# Patient Record
Sex: FEMALE | Race: WHITE | Employment: PART TIME | URBAN - METROPOLITAN AREA
[De-identification: names, ages, dates, MRNs, and addresses within clinical notes are randomized per-mention and may not be internally consistent; named-entity substitution may affect disease eponyms.]

---

## 2017-01-30 ENCOUNTER — HOSPITAL ENCOUNTER (OUTPATIENT)
Dept: MAMMOGRAPHY | Age: 42
Discharge: HOME | End: 2017-01-30
Attending: SPECIALIST
Payer: COMMERCIAL

## 2017-01-30 ENCOUNTER — TELEPHONE (OUTPATIENT)
Dept: SURGERY | Age: 42
End: 2017-01-30

## 2017-01-30 ENCOUNTER — DOCUMENTATION ONLY (OUTPATIENT)
Dept: SURGERY | Age: 42
End: 2017-01-30

## 2017-01-30 DIAGNOSIS — R92.8 FOLLOW-UP EXAMINATION OF ABNORMAL MAMMOGRAM: ICD-10-CM

## 2017-01-30 PROCEDURE — 76642 ULTRASOUND BREAST LIMITED: CPT

## 2017-01-30 NOTE — PROGRESS NOTES
I called and left a message on the patient's answering machine to make an appointment with Dr. Jody Solitario prior to setting up surgery.

## 2017-01-30 NOTE — TELEPHONE ENCOUNTER
Patient stopped in this morning after her mammogram stating that she saw Dr. Jody Solitario about one year ago for a sebaceous cyst.  It is not infected, however it has recurred, and she would like to have this removed. She wanted to know if she needed to come in to be seen or if she could just schedule surgery for this. I advised the PSR to let the patient know that Dr. Jody Solitario or her nurse will contact her to let her know if she needs to come in prior to scheduling surgery.

## 2018-02-26 ENCOUNTER — HOSPITAL ENCOUNTER (OUTPATIENT)
Dept: MAMMOGRAPHY | Age: 43
Discharge: HOME | End: 2018-02-26
Attending: SPECIALIST
Payer: COMMERCIAL

## 2018-02-26 DIAGNOSIS — Z12.39 BREAST SCREENING: ICD-10-CM

## 2018-02-26 PROCEDURE — 77067 SCR MAMMO BI INCL CAD: CPT

## 2018-07-24 ENCOUNTER — OFFICE VISIT (OUTPATIENT)
Dept: SURGERY | Age: 43
End: 2018-07-24

## 2018-07-24 VITALS
SYSTOLIC BLOOD PRESSURE: 112 MMHG | BODY MASS INDEX: 25.55 KG/M2 | WEIGHT: 159 LBS | HEART RATE: 69 BPM | DIASTOLIC BLOOD PRESSURE: 69 MMHG | HEIGHT: 66 IN

## 2018-07-24 DIAGNOSIS — N60.82 SEBACEOUS CYST OF BREAST, LEFT: Primary | ICD-10-CM

## 2018-07-24 RX ORDER — VILAZODONE HYDROCHLORIDE 40 MG/1
TABLET ORAL
Refills: 11 | COMMUNITY
Start: 2018-06-19

## 2018-07-24 RX ORDER — NORETHINDRONE ACETATE AND ETHINYL ESTRADIOL .03; 1.5 MG/1; MG/1
TABLET ORAL
COMMUNITY
End: 2020-01-31 | Stop reason: ALTCHOICE

## 2018-07-24 NOTE — MR AVS SNAPSHOT
[address] [facility] [address] [facility] 
[phone] Patient: Lugenia Kanner MRN: [id number] Visit Information Date & Time Provider Department Dept. Phone Encounter #  
 7/24/2018 10:30 AM Barbie Joya MD [facility] [phone] [id number] Upcoming Health Maintenance Date Due DTaP/Tdap/Td series (1 - Tdap) 7/23/1994 PAP AKA CERVICAL CYTOLOGY 7/23/1994 Influenza Age 5 to Adult 8/1/2018 Allergies as of 7/24/2018  Review Complete On: 7/24/2018 By: Barbie Joya MD  
 No Known Allergies Current Immunizations  Never Reviewed No immunizations on file. Not reviewed this visit You Were Diagnosed With   
  
 Codes Comments Sebaceous cyst of breast, left    -  Primary ICD-10-CM: G03.88 ICD-9-CM: 610.8 Vitals BP Pulse Height(growth percentile) Weight(growth percentile) BMI OB Status 112/69 69 5' 6\" (1.676 m) 159 lb (72.1 kg) 25.66 kg/m2 Having regular periods Smoking Status Never Smoker BMI and BSA Data Body Mass Index Body Surface Area  
 25.66 kg/m 2 1.83 m 2 Your Updated Medication List  
  
   
This list is accurate as of 7/24/18 12:07 PM.  Always use your most recent med list.  
  
  
  
  
 ALPRAZolam 0.5 mg tablet Commonly known as:  Eliezer Vegas 1.5/30 (21) 1.5-30 mg-mcg Tab Generic drug:  norethindrone ac-eth estradiol Take  by mouth. VIIBRYD 40 mg Tab tablet Generic drug:  vilazodone TAKE 1 TABLET BY MOUTH EVERY DAY Introducing [facility]! [facility] introduces BuildingLayer patient portal. Now you can access parts of your medical record, email your doctor's office, and request medication refills online. 1. In your internet browser, go to [email or web address] 2. Click on the First Time User? Click Here link in the Sign In box.  You will see the New Member Sign Up page. 3. Enter your Linkurious Access Code exactly as it appears below. You will not need to use this code after youve completed the sign-up process. If you do not sign up before the expiration date, you must request a new code. · Linkurious Access Code: [email or web address] Expires: 10/22/2018 10:16 AM 
 
4. Enter the last four digits of your Social Security Number (xxxx) and Date of Birth (mm/dd/yyyy) as indicated and click Submit. You will be taken to the next sign-up page. 5. Create a For[employer] Life Sciencet ID. This will be your Linkurious login ID and cannot be changed, so think of one that is secure and easy to remember. 6. Create a Linkurious password. You can change your password at any time. 7. Enter your Password Reset Question and Answer. This can be used at a later time if you forget your password. 8. Enter your e-mail address. You will receive e-mail notification when new information is available in [address] 9. Click Sign Up. You can now view and download portions of your medical record. 10. Click the Download Summary menu link to download a portable copy of your medical information. If you have questions, please visit the Frequently Asked Questions section of the Linkurious website. Remember, Linkurious is NOT to be used for urgent needs. For medical emergencies, dial 911. Now available from your iPhone and Android! Please provide this summary of care documentation to your next provider. Your primary care clinician is listed as Karlo Jiménez. If you have any questions after today's visit, please call [phone].

## 2018-07-24 NOTE — PROGRESS NOTES
HISTORY OF PRESENT ILLNESS  Cameron Blackburn is a 37 y.o. female. HPI  ESTABLISHED patient here for LEFT breast cyst.  1/2016 I and D of this cyst.  She was considering surgical excision but did not have it done. The cyst is back and she would like it removed before it gets infected again. No pain. Mammogram, 2/26/18, BIRADS 1    ROS    Physical Exam   Pulmonary/Chest: Right breast exhibits no mass, no nipple discharge, no skin change and no tenderness. Left breast exhibits no mass, no nipple discharge, no skin change and no tenderness. Breasts are symmetrical.       Lymphadenopathy:     She has no cervical adenopathy. She has no axillary adenopathy. Right: No supraclavicular adenopathy present. Left: No supraclavicular adenopathy present. Excisional biopsy:  LEFT breast sebaceous cyst  1% lidocaine with epi, 6 cc  1.5 cm elliptical incision. The old I and D scar and cyst were excised. The wound was closed with 4-0 nylon x 3 stitches and dressed with steristrips, gauze and tape. ASSESSMENT and PLAN    ICD-10-CM ICD-9-CM    1. Sebaceous cyst of breast, left N60.82 610.8      LEFT breast sebaceous cyst excised. Remove sutures in 10 days.

## 2018-07-24 NOTE — COMMUNICATION BODY
HISTORY OF PRESENT ILLNESS  Bigg Hubbard is a 37 y.o. female. HPI  ESTABLISHED patient here for LEFT breast cyst.  1/2016 I and D of this cyst.  She was considering surgical excision but did not have it done. The cyst is back and she would like it removed before it gets infected again. No pain. Mammogram, 2/26/18, BIRADS 1    ROS    Physical Exam   Pulmonary/Chest: Right breast exhibits no mass, no nipple discharge, no skin change and no tenderness. Left breast exhibits no mass, no nipple discharge, no skin change and no tenderness. Breasts are symmetrical.       Lymphadenopathy:     She has no cervical adenopathy. She has no axillary adenopathy. Right: No supraclavicular adenopathy present. Left: No supraclavicular adenopathy present. Excisional biopsy:  LEFT breast sebaceous cyst  1% lidocaine with epi, 6 cc  1.5 cm elliptical incision. The old I and D scar and cyst were excised. The wound was closed with 4-0 nylon x 3 stitches and dressed with steristrips, gauze and tape. ASSESSMENT and PLAN    ICD-10-CM ICD-9-CM    1. Sebaceous cyst of breast, left N60.82 610.8      LEFT breast sebaceous cyst excised. Remove sutures in 10 days.

## 2019-04-08 ENCOUNTER — HOSPITAL ENCOUNTER (OUTPATIENT)
Dept: MAMMOGRAPHY | Age: 44
Discharge: HOME | End: 2019-04-08
Attending: SPECIALIST
Payer: COMMERCIAL

## 2019-04-08 DIAGNOSIS — Z12.31 VISIT FOR SCREENING MAMMOGRAM: ICD-10-CM

## 2019-04-08 PROCEDURE — 77067 SCR MAMMO BI INCL CAD: CPT

## 2020-01-28 ENCOUNTER — HOSPITAL ENCOUNTER (OUTPATIENT)
Dept: MAMMOGRAPHY | Age: 45
Discharge: HOME | End: 2020-01-28
Attending: SPECIALIST
Payer: COMMERCIAL

## 2020-01-28 DIAGNOSIS — N63.21 BREAST LUMP ON LEFT SIDE AT 1 O'CLOCK POSITION: ICD-10-CM

## 2020-01-28 PROCEDURE — 76642 ULTRASOUND BREAST LIMITED: CPT

## 2020-01-28 PROCEDURE — 77065 DX MAMMO INCL CAD UNI: CPT

## 2020-01-31 ENCOUNTER — HOSPITAL ENCOUNTER (OUTPATIENT)
Dept: LAB | Age: 45
Discharge: HOME | End: 2020-01-31

## 2020-01-31 ENCOUNTER — OFFICE VISIT (OUTPATIENT)
Dept: SURGERY | Age: 45
End: 2020-01-31

## 2020-01-31 VITALS
BODY MASS INDEX: 26.52 KG/M2 | SYSTOLIC BLOOD PRESSURE: 107 MMHG | DIASTOLIC BLOOD PRESSURE: 62 MMHG | HEIGHT: 66 IN | HEART RATE: 64 BPM | WEIGHT: 165 LBS

## 2020-01-31 DIAGNOSIS — N63.20 LEFT BREAST MASS: ICD-10-CM

## 2020-01-31 DIAGNOSIS — R59.0 AXILLARY ADENOPATHY: ICD-10-CM

## 2020-01-31 DIAGNOSIS — R92.8 ABNORMAL MAMMOGRAM OF LEFT BREAST: Primary | ICD-10-CM

## 2020-01-31 RX ORDER — ETONOGESTREL AND ETHINYL ESTRADIOL 11.7; 2.7 MG/1; MG/1
INSERT, EXTENDED RELEASE VAGINAL
COMMUNITY
End: 2020-03-20

## 2020-01-31 NOTE — LETTER
1/31/20 Patient: Matt Meza YOB: 1975 Date of Visit: 1/31/2020 Daljit Ulloa MD 
[address] [facility] [address] VIA Facsimile: [phone] Dear Daljit Ulloa MD, Thank you for referring Ms. Armando Shrestha to [address] [place] [address] for evaluation. My notes for this consultation are attached. If you have questions, please do not hesitate to call me. I look forward to following your patient along with you. Sincerely, Yvan Hameed MD

## 2020-01-31 NOTE — PATIENT INSTRUCTIONS
Ultrasound-Guided Breast Biopsy: About This Test  What is it? A breast biopsy removes a sample of breast tissue that is looked at under a microscope to check for breast cancer. Ultrasound is used to show an image of the breast tissue during the biopsy. This is called ultrasound-guided breast biopsy. Why is this test done? A breast biopsy is usually done to check a lump or a suspicious area for cancer. If there is a good chance that your doctor can get a sample without doing an open (surgical) biopsy, you can have a needle biopsy instead. For a needle breast biopsy, your doctor uses a needle to take a small sample of fluid or cells from the breast for testing. When the biopsy area is not easy to find, the breast biopsy needle is usually guided with ultrasound. An ultrasound uses sound waves to make a picture of the inside of the breast. The sound waves create a picture on a video monitor. How can you prepare for the test?  Talk to your doctor about all your health conditions before the test. For example, tell your doctor if you:  · Are taking any medicines. · Are allergic to any medicines. · Have had bleeding problems, or if you take aspirin or some other blood thinner. · Are or might be pregnant. What happens before the test?  · You will take off your clothing above the waist. A paper or cloth gown will cover your shoulders. · The biopsy will be done while you sit or lie on an examination table. Your hands may be at your sides or raised above your head (whichever position makes it easiest to find the lump or suspicious area). · Your skin is washed with a special soap. · You may be given a shot of medicine to numb the biopsy area on your breast.  What happens during the test?  Ultrasound is used to guide the placement of the needle during the biopsy. · A warm gel will be spread on your breast.  · The ultrasound wand is pressed against your skin and gently moved around.  A picture of the breast can be seen on a video monitor. · A needle or tiny probe is put through your skin into your breast tissue. · If the lump is a cyst, the needle will take out fluid. If the lump is solid, the needle will take a sample of tissue. · The needle is removed and pressure put on the needle site to stop any bleeding. The area is covered with a bandage. What else should you know about the test?  · Ultrasound is painless and does not use radiation. · You will feel only a quick sting from the needle if you have a local anesthetic to numb the biopsy area. You may feel some pressure when the biopsy needle is put in. How long does the test take? · The test usually takes about 15 minutes. This depends on how many biopsy samples are needed. What happens after the test?  · You'll be told how long it may take to get your results back. · You will probably be able to go home right away. · You can go back to your usual activities right away, but avoid heavy lifting for 24 hours. · The site may be tender for 2 or 3 days. You may also have some bruising, swelling, or slight bleeding. ? You can use an ice pack. Put ice or a cold pack on the area for 10 to 20 minutes at a time. Put a thin cloth between the ice and your skin. ? Ask your doctor if you can take an over-the-counter pain medicine, such as acetaminophen (Tylenol), ibuprofen (Advil, Motrin), or naproxen (Aleve). Be safe with medicines. Read and follow all instructions on the label. · After a specialist looks at the biopsy sample for signs of cancer, your doctor's office will let you know the results. · If the test results are not clear, you may have another biopsy or test.  Follow-up care is a key part of your treatment and safety. Be sure to make and go to all appointments, and call your doctor if you are having problems. It's also a good idea to keep a list of the medicines you take. Ask your doctor when you can expect to have your test results.   Where can you learn more?  Go to http://jian-darline.info/. Enter B579 in the search box to learn more about \"Ultrasound-Guided Breast Biopsy: About This Test.\"  Current as of: December 19, 2018  Content Version: 12.2  © 3780-6015 [employer]. Care instructions adapted under license by [employer] (which disclaims liability or warranty for this information). If you have questions about a medical condition or this instruction, always ask your healthcare professional. [place] [address] any warranty or liability for your use of this information.

## 2020-01-31 NOTE — PROGRESS NOTES
HISTORY OF PRESENT ILLNESS  Vlad England is a 53 y.o. female. HPI NEW Patient presents for consultation at the request of Dr. Jean Wynn for abnormal mammogram and breast ultrasound LEFT breast. She was able to feel a lump in her LEFT breast which led to diagnostic imaging. She had a normal screening mammogram in April 2019. She has been Dr. Kevin Monson in the past for sebaceous cyst in LEFT breast.   Her mother is here with her today. Family history-  No FH of breast cancer. Breast imaging-  [facility] Results (most recent):  Results from Hospital Encounter encounter on 01/28/20   [facility] MAMMO LT DX INCL CAD    Narrative INDICATION: Left breast upper outer quadrant palpable abnormality. Digital diagnostic left breast diagnostic mammogram is performed and interpreted  in conjunction with CAD. Direct comparison is made to prior mammograms. The breast parenchyma is heterogeneously dense. Within the upper-outer quadrant  of the left breast, there is a partially obscured, spiculated lesion, new  compared to prior mammograms and correlating with palpable abnormality. There  are also new associated linear microcalcifications. Within the right axilla,  there are two enlarged lymph nodes. Targeted sonographic evaluation of the upper-outer quadrant of the left breast  is performed. Within the 2:00 position of the left breast, there is an irregular  1.9 cm x 1.8 cm x 1 cm shadowing hypoechoic mass, correlating with palpable and  mammographic abnormalities. Targeted sonographic evaluation of the left axilla is performed. Within the left  axilla, there are two new, enlarged suspicious appearing lymph nodes; the larger  of the two measures 2.7 cm x 2 cm. Impression IMPRESSION: BI-RADS 5. Highly suggestive of malignancy. Recommend histologic  correlation. 2:00 left breast lesion and left axillary lymphadenopathy.  Findings  were discussed with the patient and communicated to Dr. Nelson Slight office at the  time of interpretation. Past Medical History:   Diagnosis Date    Anxiety and depression      Past Surgical History:   Procedure Laterality Date    HX BREAST BIOPSY Left 07/24/2018    Left sebaceous cyst surgically removed    HX CYST INCISION AND DRAINAGE Left 20+ yrs ago    HX HEENT      tonsillectomy     Social History     Socioeconomic History    Marital status:      Spouse name: Not on file    Number of children: Not on file    Years of education: Not on file    Highest education level: Not on file   Occupational History    Not on file   Social Needs    Financial resource strain: Not on file    Food insecurity:     Worry: Not on file     Inability: Not on file    Transportation needs:     Medical: Not on file     Non-medical: Not on file   Tobacco Use    Smoking status: Never Smoker    Smokeless tobacco: Never Used   Substance and Sexual Activity    Alcohol use: No    Drug use: No    Sexual activity: Not on file   Lifestyle    Physical activity:     Days per week: Not on file     Minutes per session: Not on file    Stress: Not on file   Relationships    Social connections:     Talks on phone: Not on file     Gets together: Not on file     Attends Church service: Not on file     Active member of club or organization: Not on file     Attends meetings of clubs or organizations: Not on file     Relationship status: Not on file    Intimate partner violence:     Fear of current or ex partner: Not on file     Emotionally abused: Not on file     Physically abused: Not on file     Forced sexual activity: Not on file   Other Topics Concern    Not on file   Social History Narrative    Not on file     OB History    No obstetric history on file. Obstetric Comments   Menarche:  13. LMP: 12/2019. # of Children:  4. Age at Delivery of First Child:  30.   Hysterectomy/oophorectomy:  YES/NO. Breast Bx:  ? Beatrice Gregory Hx of Breast Feeding:  yes. BCP:  yes.  Hormone therapy:  no. Current Outpatient Medications:     ethinyl estradiol-etonogestrel (NUVARING) 0.12-0.015 mg/24 hr vaginal ring, by Intravaginal route., Disp: , Rfl:     VIIBRYD 40 mg tab tablet, TAKE 1 TABLET BY MOUTH EVERY DAY, Disp: , Rfl: 11    ALPRAZolam (XANAX) 0.5 mg tablet, , Disp: , Rfl: 1  No Known Allergies  Review of Systems   Constitutional: Negative. HENT: Negative. Eyes: Negative. Respiratory: Negative. Cardiovascular: Negative. Gastrointestinal: Negative. Genitourinary: Negative. Musculoskeletal: Negative. Skin: Negative. Neurological: Negative. Endo/Heme/Allergies: Negative. Psychiatric/Behavioral: The patient is nervous/anxious. All other systems reviewed and are negative. Physical Exam  Vitals signs and nursing note reviewed. Constitutional:       Appearance: She is well-developed. HENT:      Head: Normocephalic. Neck:      Musculoskeletal: Neck supple. Thyroid: No thyromegaly. Pulmonary:      Effort: Pulmonary effort is normal.   Chest:      Breasts: Breasts are symmetrical.         Right: No inverted nipple, mass, nipple discharge, skin change or tenderness. Left: Mass present. No inverted nipple, nipple discharge, skin change or tenderness. Comments: 2 cm upper outer quadrant mass on left breast   Abdominal:      Palpations: Abdomen is soft. Musculoskeletal: Normal range of motion. Lymphadenopathy:      Cervical: No cervical adenopathy. Upper Body:      Left upper body: Axillary adenopathy present. Skin:     General: Skin is warm and dry. Neurological:      Mental Status: She is alert and oriented to person, place, and time. US - Guided Core Biopsy  Indication : Palpable mass left breast 2:00   Ultrasound Findings: 2:00 position of the left breast, there is an irregular  1.9 cm x 1.8 cm x 1 cm shadowing hypoechoic mass  Prep : alcohol. Anesthesia : 1% lidocaine with epinephrine, 10 cc   Device :  The Sportingoe device was advanced through the lesion and captured tissue with real-time Ultrasound Confirmation. Core Sampling :  3 cores were obtained. Marker: hydromark   Dressing : Steristrips, gauze and tape. Instructions : Remove gauze this evening. Remove steristrips in one week. US - Guided Core Biopsy  Indication : Palpable mass left axilla  Ultrasound Findings: 2 abnormal enlarged nodes with loss of fatty hilum left axilla   Prep : alcohol. Anesthesia : 1% lidocaine with epinephrine, 10 cc. Device : The bard marquee device was advanced through the lesion and captured tissue with real-time Ultrasound Confirmation. Core Sampling :  3 cores were obtained. Marker: hydromark    Dressing : Steristrips, gauze and tape. Instructions : Remove gauze this evening. Remove steristrips in one week. [address] [place] [address]  OFFICE PROCEDURE PROGRESS NOTE        Chart reviewed for the following:   Aura Frank MD, have reviewed the History, Physical and updated the Allergic reactions for [address] performed immediately prior to start of procedure:   Aura Frank MD, have performed the following reviews on [address] [place] [address] prior to the start of the procedure:            * Patient was identified by name and date of birth   * Agreement on procedure being performed was verified  * Risks and Benefits explained to the patient  * Procedure site verified and marked as necessary  * Patient was positioned for comfort  * Consent was signed and verified     Time: 12:00 pm       Date of procedure: 1/31/2020    Procedure performed by:  Christianne Balderrama MD    Provider assisted by: Heather Rader RN    Patient assisted by: self    How tolerated by patient: tolerated the procedure well with no complications    Post Procedural Pain Scale: 2 - Hurts Little Bit    Comments: none          ASSESSMENT and PLAN    ICD-10-CM ICD-9-CM    1.  Abnormal mammogram of left breast R92.8 793.80 SURGICAL PATHOLOGY   2. Left breast mass N63.20 611.72    3. Axillary adenopathy R59.0 785. 10      52 yo female with Birad 5 left breast mass and axillary node. She is s/p core biopsy of both. I had a discussion with her and her mom I thought this was a breast cancer and that it had spread to at least 2 lymph nodes. I will call her with results and plan will be breast mri and ct scans for staging. She will also need genetic testing. They agreed with the plan. Her referring physician Dr. Dayana Preston was notified. She tolerated the biopsies well.

## 2020-01-31 NOTE — COMMUNICATION BODY
HISTORY OF PRESENT ILLNESS  Dilma Ziegler is a 53 y.o. female. HPI NEW Patient presents for consultation at the request of Dr. Martha Finley for abnormal mammogram and breast ultrasound LEFT breast. She was able to feel a lump in her LEFT breast which led to diagnostic imaging. She had a normal screening mammogram in April 2019. She has been Dr. Jad Rivera in the past for sebaceous cyst in LEFT breast.   Her mother is here with her today. Family history-  No FH of breast cancer. Breast imaging-  [facility] Results (most recent):  Results from Hospital Encounter encounter on 01/28/20   [facility] MAMMO LT DX INCL CAD    Narrative INDICATION: Left breast upper outer quadrant palpable abnormality. Digital diagnostic left breast diagnostic mammogram is performed and interpreted  in conjunction with CAD. Direct comparison is made to prior mammograms. The breast parenchyma is heterogeneously dense. Within the upper-outer quadrant  of the left breast, there is a partially obscured, spiculated lesion, new  compared to prior mammograms and correlating with palpable abnormality. There  are also new associated linear microcalcifications. Within the right axilla,  there are two enlarged lymph nodes. Targeted sonographic evaluation of the upper-outer quadrant of the left breast  is performed. Within the 2:00 position of the left breast, there is an irregular  1.9 cm x 1.8 cm x 1 cm shadowing hypoechoic mass, correlating with palpable and  mammographic abnormalities. Targeted sonographic evaluation of the left axilla is performed. Within the left  axilla, there are two new, enlarged suspicious appearing lymph nodes; the larger  of the two measures 2.7 cm x 2 cm. Impression IMPRESSION: BI-RADS 5. Highly suggestive of malignancy. Recommend histologic  correlation. 2:00 left breast lesion and left axillary lymphadenopathy.  Findings  were discussed with the patient and communicated to Dr. Johny piper at the  time of interpretation. Past Medical History:   Diagnosis Date    Anxiety and depression      Past Surgical History:   Procedure Laterality Date    HX BREAST BIOPSY Left 07/24/2018    Left sebaceous cyst surgically removed    HX CYST INCISION AND DRAINAGE Left 20+ yrs ago    HX HEENT      tonsillectomy     Social History     Socioeconomic History    Marital status:      Spouse name: Not on file    Number of children: Not on file    Years of education: Not on file    Highest education level: Not on file   Occupational History    Not on file   Social Needs    Financial resource strain: Not on file    Food insecurity:     Worry: Not on file     Inability: Not on file    Transportation needs:     Medical: Not on file     Non-medical: Not on file   Tobacco Use    Smoking status: Never Smoker    Smokeless tobacco: Never Used   Substance and Sexual Activity    Alcohol use: No    Drug use: No    Sexual activity: Not on file   Lifestyle    Physical activity:     Days per week: Not on file     Minutes per session: Not on file    Stress: Not on file   Relationships    Social connections:     Talks on phone: Not on file     Gets together: Not on file     Attends Holiness service: Not on file     Active member of club or organization: Not on file     Attends meetings of clubs or organizations: Not on file     Relationship status: Not on file    Intimate partner violence:     Fear of current or ex partner: Not on file     Emotionally abused: Not on file     Physically abused: Not on file     Forced sexual activity: Not on file   Other Topics Concern    Not on file   Social History Narrative    Not on file     OB History    No obstetric history on file. Obstetric Comments   Menarche:  13. LMP: 12/2019. # of Children:  4. Age at Delivery of First Child:  30.   Hysterectomy/oophorectomy:  YES/NO. Breast Bx:  ? Mliss Elise Hx of Breast Feeding:  yes. BCP:  yes.  Hormone therapy:  no. Current Outpatient Medications:     ethinyl estradiol-etonogestrel (NUVARING) 0.12-0.015 mg/24 hr vaginal ring, by Intravaginal route., Disp: , Rfl:     VIIBRYD 40 mg tab tablet, TAKE 1 TABLET BY MOUTH EVERY DAY, Disp: , Rfl: 11    ALPRAZolam (XANAX) 0.5 mg tablet, , Disp: , Rfl: 1  No Known Allergies  Review of Systems   Constitutional: Negative. HENT: Negative. Eyes: Negative. Respiratory: Negative. Cardiovascular: Negative. Gastrointestinal: Negative. Genitourinary: Negative. Musculoskeletal: Negative. Skin: Negative. Neurological: Negative. Endo/Heme/Allergies: Negative. Psychiatric/Behavioral: The patient is nervous/anxious. All other systems reviewed and are negative. Physical Exam  Vitals signs and nursing note reviewed. Constitutional:       Appearance: She is well-developed. HENT:      Head: Normocephalic. Neck:      Musculoskeletal: Neck supple. Thyroid: No thyromegaly. Pulmonary:      Effort: Pulmonary effort is normal.   Chest:      Breasts: Breasts are symmetrical.         Right: No inverted nipple, mass, nipple discharge, skin change or tenderness. Left: Mass present. No inverted nipple, nipple discharge, skin change or tenderness. Comments: 2 cm upper outer quadrant mass on left breast   Abdominal:      Palpations: Abdomen is soft. Musculoskeletal: Normal range of motion. Lymphadenopathy:      Cervical: No cervical adenopathy. Upper Body:      Left upper body: Axillary adenopathy present. Skin:     General: Skin is warm and dry. Neurological:      Mental Status: She is alert and oriented to person, place, and time. US - Guided Core Biopsy  Indication : Palpable mass left breast 2:00   Ultrasound Findings: 2:00 position of the left breast, there is an irregular  1.9 cm x 1.8 cm x 1 cm shadowing hypoechoic mass  Prep : alcohol. Anesthesia : 1% lidocaine with epinephrine, 10 cc   Device :  The MoboTape device was advanced through the lesion and captured tissue with real-time Ultrasound Confirmation. Core Sampling :  3 cores were obtained. Marker: hydromark   Dressing : Steristrips, gauze and tape. Instructions : Remove gauze this evening. Remove steristrips in one week. US - Guided Core Biopsy  Indication : Palpable mass left axilla  Ultrasound Findings: 2 abnormal enlarged nodes with loss of fatty hilum left axilla   Prep : alcohol. Anesthesia : 1% lidocaine with epinephrine, 10 cc. Device : The ProtoExchange marquee device was advanced through the lesion and captured tissue with real-time Ultrasound Confirmation. Core Sampling :  3 cores were obtained. Marker: hydromark    Dressing : Steristrips, gauze and tape. Instructions : Remove gauze this evening. Remove steristrips in one week. [address] [place] [address]  OFFICE PROCEDURE PROGRESS NOTE        Chart reviewed for the following:   Erica Terry MD, have reviewed the History, Physical and updated the Allergic reactions for Erica [address] performed immediately prior to start of procedure:   Erica Terry MD, have performed the following reviews on [address] [place] [address] prior to the start of the procedure:            * Patient was identified by name and date of birth   * Agreement on procedure being performed was verified  * Risks and Benefits explained to the patient  * Procedure site verified and marked as necessary  * Patient was positioned for comfort  * Consent was signed and verified     Time: 12:00 pm       Date of procedure: 1/31/2020    Procedure performed by:  Joelle Mercado MD    Provider assisted by: Alexandra Clements RN    Patient assisted by: self    How tolerated by patient: tolerated the procedure well with no complications    Post Procedural Pain Scale: 2 - Hurts Little Bit    Comments: none          ASSESSMENT and PLAN    ICD-10-CM ICD-9-CM    1.  Abnormal mammogram of left breast R92.8 793.80 SURGICAL PATHOLOGY   2. Left breast mass N63.20 611.72    3. Axillary adenopathy R59.0 785. 10      52 yo female with Birad 5 left breast mass and axillary node. She is s/p core biopsy of both. I had a discussion with her and her mom I thought this was a breast cancer and that it had spread to at least 2 lymph nodes. I will call her with results and plan will be breast mri and ct scans for staging. She will also need genetic testing. They agreed with the plan. Her referring physician Dr. Luz Gallo was notified. She tolerated the biopsies well.

## 2020-02-05 ENCOUNTER — DOCUMENTATION ONLY (OUTPATIENT)
Dept: SURGERY | Age: 45
End: 2020-02-05

## 2020-02-05 DIAGNOSIS — C77.3 CARCINOMA OF RIGHT BREAST METASTATIC TO AXILLARY LYMPH NODE (HCC): Primary | ICD-10-CM

## 2020-02-05 DIAGNOSIS — C50.912 MALIGNANT NEOPLASM OF LEFT FEMALE BREAST, UNSPECIFIED ESTROGEN RECEPTOR STATUS, UNSPECIFIED SITE OF BREAST (HCC): ICD-10-CM

## 2020-02-05 DIAGNOSIS — C50.911 CARCINOMA OF RIGHT BREAST METASTATIC TO AXILLARY LYMPH NODE (HCC): Primary | ICD-10-CM

## 2020-02-05 NOTE — PROGRESS NOTES
DR. Thurman Babinski ON 2- AT 2:30 PM/ THEY WILL MAIL PATIENT PACKET, FILL OUT FORMS AND BRING TO APPT.     [phone]  [facility]  MOB 2  [address]    MESSAGE WAS LEFT FOR PATIENT TO CALL ME BACK TO GET ABOVE INFORMATION

## 2020-02-07 ENCOUNTER — HOSPITAL ENCOUNTER (OUTPATIENT)
Dept: NUCLEAR MEDICINE | Age: 45
Discharge: HOME | End: 2020-02-07
Attending: SURGERY
Payer: COMMERCIAL

## 2020-02-07 ENCOUNTER — HOSPITAL ENCOUNTER (OUTPATIENT)
Dept: CT IMAGING | Age: 45
Discharge: HOME | End: 2020-02-07
Attending: SURGERY
Payer: COMMERCIAL

## 2020-02-07 DIAGNOSIS — C77.3 BREAST CANCER METASTASIZED TO AXILLARY LYMPH NODE, LEFT (HCC): ICD-10-CM

## 2020-02-07 DIAGNOSIS — C50.912 BREAST CANCER METASTASIZED TO AXILLARY LYMPH NODE, LEFT (HCC): ICD-10-CM

## 2020-02-07 PROCEDURE — 74011000258 HC RX REV CODE- 258: Performed by: RADIOLOGY

## 2020-02-07 PROCEDURE — A9503 TC99M MEDRONATE: HCPCS

## 2020-02-07 PROCEDURE — 74011636320 HC RX REV CODE- 636/320: Performed by: RADIOLOGY

## 2020-02-07 PROCEDURE — 78306 BONE IMAGING WHOLE BODY: CPT

## 2020-02-07 PROCEDURE — 71260 CT THORAX DX C+: CPT

## 2020-02-07 PROCEDURE — 74177 CT ABD & PELVIS W/CONTRAST: CPT

## 2020-02-07 RX ORDER — SODIUM CHLORIDE 0.9 % (FLUSH) 0.9 %
10 SYRINGE (ML) INJECTION
Status: COMPLETED | OUTPATIENT
Start: 2020-02-07 | End: 2020-02-07

## 2020-02-07 RX ADMIN — IOHEXOL 50 ML: 240 INJECTION, SOLUTION INTRATHECAL; INTRAVASCULAR; INTRAVENOUS; ORAL at 13:47

## 2020-02-07 RX ADMIN — Medication 10 ML: at 13:47

## 2020-02-07 RX ADMIN — SODIUM CHLORIDE 100 ML: 900 INJECTION, SOLUTION INTRAVENOUS at 13:47

## 2020-02-07 RX ADMIN — IOPAMIDOL 100 ML: 755 INJECTION, SOLUTION INTRAVENOUS at 13:47

## 2020-02-11 ENCOUNTER — OFFICE VISIT (OUTPATIENT)
Dept: ONCOLOGY | Age: 45
End: 2020-02-11

## 2020-02-11 VITALS
HEART RATE: 67 BPM | DIASTOLIC BLOOD PRESSURE: 73 MMHG | HEIGHT: 66 IN | WEIGHT: 169.6 LBS | OXYGEN SATURATION: 98 % | TEMPERATURE: 98.8 F | BODY MASS INDEX: 27.26 KG/M2 | RESPIRATION RATE: 16 BRPM | SYSTOLIC BLOOD PRESSURE: 119 MMHG

## 2020-02-11 DIAGNOSIS — Z17.1 MALIGNANT NEOPLASM OF UPPER-OUTER QUADRANT OF LEFT BREAST IN FEMALE, ESTROGEN RECEPTOR NEGATIVE (HCC): Primary | ICD-10-CM

## 2020-02-11 DIAGNOSIS — R11.2 CINV (CHEMOTHERAPY-INDUCED NAUSEA AND VOMITING): ICD-10-CM

## 2020-02-11 DIAGNOSIS — Z51.81 ENCOUNTER FOR MONITORING CARDIOTOXIC DRUG THERAPY: ICD-10-CM

## 2020-02-11 DIAGNOSIS — R19.7 DIARRHEA, UNSPECIFIED TYPE: ICD-10-CM

## 2020-02-11 DIAGNOSIS — C50.412 MALIGNANT NEOPLASM OF UPPER-OUTER QUADRANT OF LEFT BREAST IN FEMALE, ESTROGEN RECEPTOR NEGATIVE (HCC): Primary | ICD-10-CM

## 2020-02-11 DIAGNOSIS — T45.1X5A CINV (CHEMOTHERAPY-INDUCED NAUSEA AND VOMITING): ICD-10-CM

## 2020-02-11 DIAGNOSIS — Z79.899 ENCOUNTER FOR MONITORING CARDIOTOXIC DRUG THERAPY: ICD-10-CM

## 2020-02-11 RX ORDER — LIDOCAINE AND PRILOCAINE 25; 25 MG/G; MG/G
CREAM TOPICAL AS NEEDED
Qty: 30 G | Refills: 0 | Status: ON HOLD | OUTPATIENT
Start: 2020-02-11 | End: 2021-04-06

## 2020-02-11 RX ORDER — DIPHENOXYLATE HYDROCHLORIDE AND ATROPINE SULFATE 2.5; .025 MG/1; MG/1
1 TABLET ORAL
Qty: 30 TAB | Refills: 1 | Status: SHIPPED | OUTPATIENT
Start: 2020-02-11 | End: 2020-08-17

## 2020-02-11 RX ORDER — DEXAMETHASONE 4 MG/1
8 TABLET ORAL 2 TIMES DAILY WITH MEALS
Qty: 75 TAB | Refills: 1 | Status: ON HOLD | OUTPATIENT
Start: 2020-02-11 | End: 2021-04-06

## 2020-02-11 RX ORDER — PROCHLORPERAZINE MALEATE 10 MG
5 TABLET ORAL
Qty: 30 TAB | Refills: 1 | Status: ON HOLD | OUTPATIENT
Start: 2020-02-11 | End: 2021-04-06

## 2020-02-11 RX ORDER — ONDANSETRON 4 MG/1
4 TABLET, ORALLY DISINTEGRATING ORAL
Qty: 30 TAB | Refills: 1 | Status: SHIPPED | OUTPATIENT
Start: 2020-02-11 | End: 2020-04-15 | Stop reason: SDUPTHER

## 2020-02-11 NOTE — PROGRESS NOTES
52 y/o cauc female here for a new pt. Appointment for newly diagnosed left sided breast cancer. Pt had an normal mammo in April 2019. Pt felt a lump which is why she got another mammogram.  Biopsy of lump and LN (+) for cancer. Her 2(+) er/pr (-). Here to go over a plan of care. Pt prefers to do neoadjuvant tx. Pt is here with her mother and . Pt mom thinks her mother had breast cancer.

## 2020-02-11 NOTE — PROGRESS NOTES
[facility]  at [address] [place] [address], [address] [facility]  [phone]       Oncology Consultation        Patient: Pati Estrella MRN: [id number]  SSN: [id number]    YOB: 1975  Age: 53 y.o. Sex: female      Subjective:      Pati Estrella is a 53 y.o. female who I am seeing in consultation for a new diagnosis of left sided invasive breast carcinoma. She felt a lump in her left breast. Dr. Celeste Us obtained a diagnostic mammogram. The mammogram showed an abnormality in the upper quadrant of the left breast. A biopsy of the lesion revealed a ER 0% OR 0% and Her 2 3+ Gr 3 IDC of the left breast. She comes in to discuss the next steps. Review of Systems:    Constitutional: negative  Eyes: negative  Ears, Nose, Mouth, Throat, and Face: negative  Respiratory: negative  Cardiovascular: negative  Gastrointestinal: negative  Genitourinary:negative  Integument/Breast: negative  Hematologic/Lymphatic: negative  Musculoskeletal:negative  Neurological: negative    Past Medical History:   Diagnosis Date    Anxiety and depression     Cancer ([facility] [address] 01/2020    left breast ca er/pr (-) her 2 (+)     Past Surgical History:   Procedure Laterality Date    HX BREAST BIOPSY Left 07/24/2018    Left sebaceous cyst surgically removed    HX CYST INCISION AND DRAINAGE Left 20+ yrs ago   [place] HX HEENT      tonsillectomy      Family History   Problem Relation Age of Onset    Hypertension Mother     Kidney Disease Mother     Thyroid Disease Mother      Social History     Tobacco Use    Smoking status: Never Smoker    Smokeless tobacco: Never Used   Substance Use Topics    Alcohol use: No      Prior to Admission medications    Medication Sig Start Date End Date Taking?  Authorizing Provider   ondansetron (ZOFRAN ODT) 4 mg disintegrating tablet Take 1 Tab by mouth every eight (8) hours as needed for Nausea or Vomiting. 2/11/20  Yes Nany Rosenberg NP   prochlorperazine (COMPAZINE) 10 mg tablet Take 0.5 Tabs by mouth every six (6) hours as needed for Nausea for up to 30 doses. 2/11/20  Yes Mikayla Marquez NP   diphenoxylate-atropine (LOMOTIL) 2.5-0.025 mg per tablet Take 1 Tab by mouth four (4) times daily as needed for Diarrhea. Max Daily Amount: 4 Tabs. 2/11/20  Yes Mikayla Marquez NP   lidocaine-prilocaine (EMLA) topical cream Apply  to affected area as needed for Pain. 2/11/20  Yes Mikayla Marquez NP   dexAMETHasone (DECADRON) 4 mg tablet Take 8 mg by mouth two (2) times daily (with meals). The day of chemotherapy and the day after chemotherapy. 2/11/20  Yes Nany Rosenberg NP   ethinyl estradiol-etonogestrel (NUVARING) 0.12-0.015 mg/24 hr vaginal ring by Intravaginal route. Yes Provider, Historical   VIIBRYD 40 mg tab tablet TAKE 1 TABLET BY MOUTH EVERY DAY 6/19/18  Yes Provider, Historical   ALPRAZolam (XANAX) 0.5 mg tablet 0.5 mg as needed. 1/21/16  Yes Provider, Historical              No Known Allergies        Objective:     Vitals:    02/11/20 0915   BP: 119/73   Pulse: 67   Resp: 16   Temp: 98.8 °F (37.1 °C)   TempSrc: Oral   SpO2: 98%   Weight: 169 lb 9.6 oz (76.9 kg)   Height: 5' 6\" (1.676 m)            Physical Exam:    GENERAL: alert, cooperative, no distress, appears stated age  EYE: conjunctivae/corneas clear. PERRL, EOM's intact  LYMPHATIC: Cervical, supraclavicular, and axillary nodes normal.   THROAT & NECK: normal and no erythema or exudates noted. LUNG: clear to auscultation bilaterally  HEART: regular rate and rhythm, S1, S2 normal, no murmur, click, rub or gallop  ABDOMEN: soft, non-tender. Bowel sounds normal. No masses,  no organomegaly  EXTREMITIES:  extremities normal, atraumatic, no cyanosis or edema  SKIN: Normal.  NEUROLOGIC: AOx3. Gait normal. Reflexes and motor strength normal and symmetric. Cranial nerves 2-12 and sensation grossly intact. Assessment:     1.  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 I explained her the stage of the disease, pathophysiology of the disease and the treatment approaches. I answered all her questions. More than 50% of the time was utilized in education, counseling and co-ordination of care. Patient sent for consideration of neoadjuvant chemotherapy. I spent significant time in explaining the rationale of neoadjuvant chemotherapy which is tumor shrinkage with the hope to achieve a successful surgical treatment (with clear surgical margin) which may be a lumpectomy/mastectomy associated with axillary lymph node dissection. I explained to Ms. Kendall Roque that neoadjuvant chemotherapy has not shown to be superior to adjuvant chemotherapy in the treatment of breast cancer. However if the patient achieves a pathological complete remission (pCR), her chances for 5 year survival is excellent. The most effective combination for neoadjuvant treatment for Her 2 +ve locally advanced breast cancer is dual Her blockade with Pertuzumab/Herceptin in combination with Taxanes. There are two trials which have displayed a high response rate with dual Her blockade. NeoSphere trial is an open-label, phase 2 study, treatment-naive women with HER2-positive breast cancer were randomly assigned (1:1:1:1) centrally and stratified by operable, locally advanced, and inflammatory breast cancer. Patients given pertuzumab and trastuzumab plus docetaxel (group B) had a significantly improved pathological complete response rate compared with those given trastuzumab plus docetaxel, without substantial differences in tolerability (49% vs 29%). [facility] Oncology, Kallie Duncan. Al. Jan 2012].      [facility]asco is another phase II trial where patients Her 2 + locally advanced breast cancer were randomized to receive dual Her blockade with Trastuzumab/Pertuzumab in combination with Taxane and anthracycline. The rates of pCR ranged between 40-50% in the dual Her blockade. (Kevin SCHROEDER Et al. Annals of Oncology 2013)   Taken together dual Her blockade is the most effective way of treating Her 2 positive patients with locally advanced breast cancer. Thus I have elected to administer [address] + P    I counseled the patient regarding the chemotherapy. Discussion included side-effect, toxicity, benefit and risks of chemotherapy. I gave her handouts for education regarding the chemotherapy drugs. She understood the expected side-effect which includes alopecia, nausea, peripheral neuropathy, neutropenic fever, heart failure, anemia, need for transfusion among other things. After weighing the benefit and risks, the patient agreed to start chemotherapy. The patient's emotional well being was addressed during this office visit and patient seems to be coping well with the diagnosis and the treatment. Taxotere 75 mg/2 IV q3w  Carboplatin AUC 6 IV q3w  Trastuzumab 8 mg/kg followed by 6 mg/kg IV q3w  Pertuzumab 840 mg followed by 420 mg IV q3w        Plan:       > Breast MRI pending  > Port-a-cath placement - Dr. Garrett Loss  > Refer to [address] [place] [address] to start chemotherapy  > Vit D level, CBC, CMP  > 2-D Echo to assess LVEF  > Plan to start TCHP in 2-3 weeks. > She would need a repeat breast MRI after 6 cycles of chemotherapy to assess response  > Lumpectomy/Mastectomy after appropriate response to natalie-adjuvant chemotherapy  > Will continue Her2 targeted therapy for a total of 1 year. Signed By: Mic Welch MD     February 11, 2020         CC. Bruno Goodell, MD  CC.  Mati Aranda MD

## 2020-02-12 ENCOUNTER — HOSPITAL ENCOUNTER (OUTPATIENT)
Dept: MRI IMAGING | Age: 45
Discharge: HOME | End: 2020-02-12
Attending: SURGERY
Payer: COMMERCIAL

## 2020-02-12 DIAGNOSIS — C50.912 BREAST CANCER METASTASIZED TO AXILLARY LYMPH NODE, LEFT (HCC): ICD-10-CM

## 2020-02-12 DIAGNOSIS — C77.3 BREAST CANCER METASTASIZED TO AXILLARY LYMPH NODE, LEFT (HCC): ICD-10-CM

## 2020-02-12 PROCEDURE — 74011000258 HC RX REV CODE- 258: Performed by: SURGERY

## 2020-02-12 PROCEDURE — 77049 MRI BREAST C-+ W/CAD BI: CPT

## 2020-02-12 PROCEDURE — 74011250636 HC RX REV CODE- 250/636: Performed by: SURGERY

## 2020-02-12 PROCEDURE — A9585 GADOBUTROL INJECTION: HCPCS | Performed by: SURGERY

## 2020-02-12 RX ADMIN — GADOBUTROL 7.5 ML: 604.72 INJECTION INTRAVENOUS at 09:00

## 2020-02-12 RX ADMIN — SODIUM CHLORIDE 100 ML: 900 INJECTION, SOLUTION INTRAVENOUS at 09:00

## 2020-02-17 ENCOUNTER — OFFICE VISIT (OUTPATIENT)
Dept: SURGERY | Age: 45
End: 2020-02-17

## 2020-02-17 ENCOUNTER — HOSPITAL ENCOUNTER (OUTPATIENT)
Age: 45
Setting detail: OUTPATIENT SURGERY
End: 2020-02-17
Attending: SURGERY | Admitting: SURGERY

## 2020-02-17 ENCOUNTER — HOSPITAL ENCOUNTER (OUTPATIENT)
Dept: NON INVASIVE DIAGNOSTICS | Age: 45
Discharge: HOME | End: 2020-02-17
Attending: INTERNAL MEDICINE
Payer: COMMERCIAL

## 2020-02-17 VITALS — WEIGHT: 169 LBS | BODY MASS INDEX: 27.16 KG/M2 | HEIGHT: 66 IN

## 2020-02-17 VITALS — HEIGHT: 66 IN | WEIGHT: 169 LBS | BODY MASS INDEX: 27.16 KG/M2

## 2020-02-17 DIAGNOSIS — C50.412 MALIGNANT NEOPLASM OF UPPER-OUTER QUADRANT OF LEFT BREAST IN FEMALE, ESTROGEN RECEPTOR NEGATIVE (HCC): ICD-10-CM

## 2020-02-17 DIAGNOSIS — Z17.1 MALIGNANT NEOPLASM OF UPPER-OUTER QUADRANT OF LEFT BREAST IN FEMALE, ESTROGEN RECEPTOR NEGATIVE (HCC): ICD-10-CM

## 2020-02-17 DIAGNOSIS — C50.912 MALIGNANT NEOPLASM OF LEFT FEMALE BREAST, UNSPECIFIED ESTROGEN RECEPTOR STATUS, UNSPECIFIED SITE OF BREAST (HCC): Primary | ICD-10-CM

## 2020-02-17 DIAGNOSIS — Z51.81 ENCOUNTER FOR MONITORING CARDIOTOXIC DRUG THERAPY: ICD-10-CM

## 2020-02-17 DIAGNOSIS — C50.912 BREAST CANCER METASTASIZED TO AXILLARY LYMPH NODE, LEFT (HCC): ICD-10-CM

## 2020-02-17 DIAGNOSIS — Z79.899 ENCOUNTER FOR MONITORING CARDIOTOXIC DRUG THERAPY: ICD-10-CM

## 2020-02-17 DIAGNOSIS — C77.3 BREAST CANCER METASTASIZED TO AXILLARY LYMPH NODE, LEFT (HCC): ICD-10-CM

## 2020-02-17 LAB
AV VELOCITY RATIO: 0.82
ECHO AV AREA PEAK VELOCITY: 2.9 CM2
ECHO AV PEAK GRADIENT: 8 MMHG
ECHO AV PEAK VELOCITY: 141.35 CM/S
ECHO LA AREA 4C: 16.7 CM2
ECHO LA MAJOR AXIS: 3.7 CM
ECHO LA VOL 4C: 44.61 ML (ref 22–52)
ECHO LA VOLUME INDEX A4C: 23.96 ML/M2 (ref 16–28)
ECHO LV GLOBAL LONGITUDINAL STRAIN (GLS): 21.2 %
ECHO LV INTERNAL DIMENSION DIASTOLIC: 3.57 CM (ref 3.9–5.3)
ECHO LV INTERNAL DIMENSION SYSTOLIC: 1.94 CM
ECHO LV IVSD: 0.93 CM (ref 0.6–0.9)
ECHO LV MASS 2D: 97.9 G (ref 67–162)
ECHO LV MASS INDEX 2D: 52.6 G/M2 (ref 43–95)
ECHO LV POSTERIOR WALL DIASTOLIC: 0.84 CM (ref 0.6–0.9)
ECHO LVOT DIAM: 2.13 CM
ECHO LVOT PEAK GRADIENT: 5.3 MMHG
ECHO LVOT PEAK VELOCITY: 115.58 CM/S
ECHO MV A VELOCITY: 68.55 CM/S
ECHO MV E VELOCITY: 81.31 CM/S
ECHO MV E/A RATIO: 1.19
ECHO PV MAX VELOCITY: 109.37 CM/S
ECHO PV PEAK GRADIENT: 4.8 MMHG
ECHO RV INTERNAL DIMENSION: 3.09 CM
ECHO RV TAPSE: 2.48 CM (ref 1.5–2)
ECHO RVOT PEAK VELOCITY: 100.57 CM/S
LVFS 2D: 45.62 %

## 2020-02-17 PROCEDURE — 93306 TTE W/DOPPLER COMPLETE: CPT

## 2020-02-17 NOTE — PROGRESS NOTES
HISTORY OF PRESENT ILLNESS  Kin Schumacher is a 53 y.o. female. HPI ESTABLISHED patient here to discuss treatment for newly diagnosed breast cancer. She has occasional twinges of pain at biopsy sites. Her mother and her  are here with her today. Breast cancer-  1/31/2020 - T2 N1 M0 (Stage IIA) Invasive ductal carcinoma, Tumor size 2 cm, grade 3, ER 0%, CO 0%, Her 2 3+  2/11/2020 - met with Dr. Stacey Blandon. Discussed neoadjuvant chemotherapy ([address] 26 + P)  2/24/2020 - scheduled for RIGHT breast MRI guided biopsy    No family history of breast or ovarian cancer. Imaging-  2/12/2020 - breast mri  2/7/2020 - bone scans and CT scans. Showed benign fibroid uterus, O/w clear  1/28/2020 - LEFT breast diagnostic mammogram and LEFT breast US    MRI Results (most recent):  Results from Hospital Encounter encounter on 02/12/20   MRI BREAST BI W WO CONT    Narrative MRI BREAST BI W WO CONT, 2/12/2020 8:53 AM  INDICATION: Malignant neoplasm of unspecified site of left female breast  HISTORY: Newly diagnosed left-sided breast cancer with local regional spread to  the left axillary lymph nodes. COMPARISON: CT of the chest, abdomen and pelvis, 2/7/2020. Bone scan, 2/7/2020. Mammogram from 2000 22,019. Ultrasound of left breast, 1/28/2020  . TECHNIQUE:  Bilateral breast MRI was performed using a dedicated breast coil without  compression with the patient in the prone position. Precontrast T1-weighted  images with fat suppression were obtained followed by bolus injection of  contrast. Postcontrast dynamic and high-resolution images were acquired. T2-weighted axial imaging with fat suppression was also performed. The images  were analyzed using software for kinetic analysis, enhancement curves, digital  subtraction, and reconstructions. Mabeline Sink   RIGHT BREAST:  Background parenchymal enhancement: Mild   There is an area of nonmasslike, clumped enhancement in the upper, outer  quadrant, deep 3rd which measures 1.7 x 0.6 x 1.8 cm.    There are no suspicious internal mammary or axillary chain lymph nodes. Ileana Morrison LEFT BREAST:  Background parenchymal enhancement: Mild  There is an irregular mass, containing a biopsy clip, in the upper, outer  quadrant of the left breast, deep 3rd which measures 2.5 x 2.1 x 3.2 cm. There  is surrounding, clumped, nonmasslike enhancement which is inferior and lateral  to the primary mass. There are 2, enlarged, left axillary lymph nodes. There is a biopsy clip within  the more superficial lymph node. There is an area of likely necrosis in the  largest lymph node. There is a 3rd, small lymph node with a focus of cortical  thickening just posterior to the enlarged lymph nodes. .    Impression IMPRESSION:  RIGHT BREAST:  1. Area of nonmasslike, clumped enhancement in the upper, outer quadrant. BI-RADS Category 4 - Suspicious abnormality. Ileana Morrison LEFT BREAST:  1. Irregular, masslike lesion in the upper, outer quadrant of the left breast,  deep 3rd which contains a biopsy clip. BI-RADS Category 6 - Known biopsy-proven malignancy. 2. There is clumped enhancement which is immediately inferior and lateral to the  primary mass which may represent surrounding DCIS. BI-RADS Category 4 - Suspicious abnormality. 3. Left axillary adenopathy. BI-RADS Category 6 - Known biopsy-proven malignancy. .  RECOMMENDATION:  MRI guided biopsy of the nonmasslike, clumped enhancement in the upper, outer  quadrant of the right breast.       Review of Systems   All other systems reviewed and are negative. Physical Exam  Vitals signs and nursing note reviewed. Chest:      Comments: No breast exam today        ASSESSMENT and PLAN    ICD-10-CM ICD-9-CM    1. Malignant neoplasm of left female breast, unspecified estrogen receptor status, unspecified site of breast (HCC) C50.912 174.9 [facility]-ANALYSIS   2.  Breast cancer metastasized to axillary lymph node, left (HCC) C50.912 174.9     C77.3 196.3      54 yo female with a left T2N1 IDC Er Pr neg  Her 2 3+. She is here with her family. I have reviewed the imaging and pathology with her and she was given copies of these reports. 90 minutes were spent face-to-face with the patient during this encounter and 90% of that time was spent on counseling and coordination of care. 1. Discussed lumpectomy and radiation vs mastectomy. Discussed reconstruction. 2. Discussed axillary node dissection    3. Discussed external beam radiation. 4. Discussed hormone therapy. 5. Discussed the possibility of chemotherapy. She needs a biopsy on the right breast which is scheduled for next Monday  Will try to get port on next day. Will send genetic testing today. Our surgical plan depends on the biopsy and genetic testing. Will see her back after cycle 3. They are happy with the plan.

## 2020-02-17 NOTE — PATIENT INSTRUCTIONS
Learning About Breast Cancer Surgery  What is breast cancer surgery? Surgery is a key part of treatment for breast cancer. The type of surgery you have depends on the size, location, and type of the cancer. It also depends on your health and what is important to you. Your doctor may combine treatments. This is a common way to treat breast cancer. You may have surgery to remove all the cancer that can be seen. After surgery you may also need radiation, chemotherapy, or hormone therapy. These treatments get rid of any cancer cells that may be left. During the surgery, the doctor may remove lymph nodes from the armpit. The lymph nodes will be looked at under a microscope. This is used to check if cancer has spread from the breast into the lymph nodes. What is a lumpectomy? Lumpectomy    Lumpectomy removes the tumor in the breast. The incision is made close to the tumor. This shows common places where the cut is made. Simple mastectomy    Simple mastectomy removes the whole breast, including nipple and skin. This shows where the cut is often made. Nipple-sparing mastectomy with inframammary cut    Nipple-sparing mastectomy removes the whole breast but leaves the skin and nipple. This shows the cut in the curve under the breast (inframammary). Nipple-sparing mastectomy with lateral cut    Nipple-sparing mastectomy removes the whole breast but leaves the skin and nipple. This shows the cut from the nipple along the side of the breast toward the armpit (lateral). Nipple-sparing mastectomy with periareolar cut    Nipple-sparing mastectomy removes the whole breast but leaves the skin and nipple. This shows the cut around the top of the nipple and along the side of the breast toward the armpit (periareolar).   Skin-sparing mastectomy with periareolar cut    Skin-sparing mastectomy removes the whole breast and the nipple, but it keeps the skin that covers the breast. This shows the cut around the nipple (periareloar). Skin-sparing mastectomy with teardrop cut    Skin-sparing mastectomy removes the whole breast and the nipple, but it keeps the skin that covers the breast. This shows the cut around the nipple in a teardrop shape. Skin-sparing mastectomy with tennis racket cut    Skin-sparing mastectomy removes the whole breast and the nipple, but it keeps the skin that covers the breast. This shows the cut around the nipple and along the side of the breast toward the armpit (tennis racket shape). What can you expect after surgery? Your doctor will send the breast tissue to a lab for testing. This will help the doctor know more about the type of cancer you have. It may take up to a week or more to get the results back. Your doctor will discuss the results with you. You may meet with a doctor who specializes in cancer treatment (an oncologist). This doctor can help you decide about any other treatment you may need. Your needs and values are important when choosing a treatment that is right for you. The amount of time you will need to recover depends on the type of surgery you had. It also depends on whether you need any more treatment. If you had a lumpectomy, you will probably look the same in a bra. But your breasts may not match in size or shape after surgery. This depends on the size of your breasts and how much tissue was removed. Surgery to create a new breast is called reconstruction. This may be done at the same time as a mastectomy. Or it may be done later. Some women choose not to do reconstruction at all. You choose what feels right for you. No matter what kind of surgery you have, you will get information about your treatment. This includes how to prepare, what to expect, and what to do afterward. Follow-up care is a key part of your treatment and safety. Be sure to make and go to all appointments, and call your doctor if you are having problems.  It's also a good idea to know your test results and keep a list of the medicines you take. Where can you learn more? Go to http://jian-darline.info/. Enter P020 in the search box to learn more about \"Learning About Breast Cancer Surgery. \"  Current as of: December 19, 2018  Content Version: 12.2  © 2303-2441 [employer]. Care instructions adapted under license by [employer] (which disclaims liability or warranty for this information). If you have questions about a medical condition or this instruction, always ask your healthcare professional. [address] any warranty or liability for your use of this information.

## 2020-02-18 DIAGNOSIS — C77.3 SECONDARY MALIGNANT NEOPLASM OF AXILLARY LYMPH NODES (HCC): ICD-10-CM

## 2020-02-18 DIAGNOSIS — C50.912 MALIGNANT NEOPLASM OF LEFT FEMALE BREAST, UNSPECIFIED ESTROGEN RECEPTOR STATUS, UNSPECIFIED SITE OF BREAST (HCC): Primary | ICD-10-CM

## 2020-02-18 PROBLEM — Z17.1 MALIGNANT NEOPLASM OF LEFT BREAST IN FEMALE, ESTROGEN RECEPTOR NEGATIVE (HCC): Status: ACTIVE | Noted: 2020-02-18

## 2020-02-21 ENCOUNTER — RESEARCH ENCOUNTER (OUTPATIENT)
Dept: ONCOLOGY | Age: 45
End: 2020-02-21

## 2020-02-21 NOTE — PROGRESS NOTES
I called Ms. Virginia Popeye and talked with her about clinical trial . She seemed interested. I sent her a read-only consent to review and explained to her that I would reach out to her on Monday or Tuesday of next week to answer any questions she may have and see her interest in the study.

## 2020-02-22 RX ORDER — HEPARIN 100 UNIT/ML
300-500 SYRINGE INTRAVENOUS AS NEEDED
Status: CANCELLED
Start: 2020-02-28

## 2020-02-22 RX ORDER — DEXAMETHASONE SODIUM PHOSPHATE 100 MG/10ML
10 INJECTION INTRAMUSCULAR; INTRAVENOUS ONCE
Status: CANCELLED | OUTPATIENT
Start: 2020-02-28

## 2020-02-22 RX ORDER — ACETAMINOPHEN 325 MG/1
650 TABLET ORAL AS NEEDED
Status: CANCELLED
Start: 2020-02-28

## 2020-02-22 RX ORDER — DIPHENHYDRAMINE HYDROCHLORIDE 50 MG/ML
25 INJECTION, SOLUTION INTRAMUSCULAR; INTRAVENOUS AS NEEDED
Status: CANCELLED
Start: 2020-02-28

## 2020-02-22 RX ORDER — ALBUTEROL SULFATE 0.83 MG/ML
2.5 SOLUTION RESPIRATORY (INHALATION) AS NEEDED
Status: CANCELLED
Start: 2020-02-28

## 2020-02-22 RX ORDER — ONDANSETRON 2 MG/ML
8 INJECTION INTRAMUSCULAR; INTRAVENOUS AS NEEDED
Status: CANCELLED | OUTPATIENT
Start: 2020-02-28

## 2020-02-22 RX ORDER — SODIUM CHLORIDE 9 MG/ML
10 INJECTION INTRAMUSCULAR; INTRAVENOUS; SUBCUTANEOUS AS NEEDED
Status: CANCELLED | OUTPATIENT
Start: 2020-02-28

## 2020-02-22 RX ORDER — DIPHENHYDRAMINE HYDROCHLORIDE 50 MG/ML
50 INJECTION, SOLUTION INTRAMUSCULAR; INTRAVENOUS AS NEEDED
Status: CANCELLED
Start: 2020-02-28

## 2020-02-22 RX ORDER — EPINEPHRINE 1 MG/ML
0.3 INJECTION, SOLUTION, CONCENTRATE INTRAVENOUS AS NEEDED
Status: CANCELLED | OUTPATIENT
Start: 2020-02-28

## 2020-02-22 RX ORDER — SODIUM CHLORIDE 0.9 % (FLUSH) 0.9 %
10 SYRINGE (ML) INJECTION AS NEEDED
Status: CANCELLED
Start: 2020-02-28

## 2020-02-22 RX ORDER — HYDROCORTISONE SODIUM SUCCINATE 100 MG/2ML
100 INJECTION, POWDER, FOR SOLUTION INTRAMUSCULAR; INTRAVENOUS AS NEEDED
Status: CANCELLED | OUTPATIENT
Start: 2020-02-28

## 2020-02-22 RX ORDER — PALONOSETRON 0.05 MG/ML
0.25 INJECTION, SOLUTION INTRAVENOUS ONCE
Status: CANCELLED | OUTPATIENT
Start: 2020-02-28

## 2020-02-22 RX ORDER — SODIUM CHLORIDE 9 MG/ML
25 INJECTION, SOLUTION INTRAVENOUS CONTINUOUS
Status: CANCELLED | OUTPATIENT
Start: 2020-02-28 | End: 2020-02-28

## 2020-02-24 ENCOUNTER — HOSPITAL ENCOUNTER (OUTPATIENT)
Dept: MAMMOGRAPHY | Age: 45
Discharge: HOME | End: 2020-02-24
Attending: SURGERY
Payer: COMMERCIAL

## 2020-02-24 ENCOUNTER — RESEARCH ENCOUNTER (OUTPATIENT)
Dept: ONCOLOGY | Age: 45
End: 2020-02-24

## 2020-02-24 ENCOUNTER — HOSPITAL ENCOUNTER (OUTPATIENT)
Dept: MRI IMAGING | Age: 45
Discharge: HOME | End: 2020-02-24
Attending: SURGERY
Payer: COMMERCIAL

## 2020-02-24 DIAGNOSIS — R92.8 ABNORMAL MRI, BREAST: ICD-10-CM

## 2020-02-24 DIAGNOSIS — R92.8 ABNORMAL MAMMOGRAM: ICD-10-CM

## 2020-02-24 PROCEDURE — 19085 BX BREAST 1ST LESION MR IMAG: CPT

## 2020-02-24 PROCEDURE — 77065 DX MAMMO INCL CAD UNI: CPT

## 2020-02-24 PROCEDURE — A9585 GADOBUTROL INJECTION: HCPCS | Performed by: SURGERY

## 2020-02-24 PROCEDURE — 88305 TISSUE EXAM BY PATHOLOGIST: CPT

## 2020-02-24 PROCEDURE — 74011250636 HC RX REV CODE- 250/636: Performed by: SURGERY

## 2020-02-24 RX ORDER — LIDOCAINE HYDROCHLORIDE AND EPINEPHRINE 10; 10 MG/ML; UG/ML
17 INJECTION, SOLUTION INFILTRATION; PERINEURAL ONCE
Status: COMPLETED | OUTPATIENT
Start: 2020-02-24 | End: 2020-02-24

## 2020-02-24 RX ORDER — LIDOCAINE HYDROCHLORIDE 10 MG/ML
3 INJECTION INFILTRATION; PERINEURAL ONCE
Status: COMPLETED | OUTPATIENT
Start: 2020-02-24 | End: 2020-02-24

## 2020-02-24 RX ADMIN — LIDOCAINE HYDROCHLORIDE AND EPINEPHRINE 170 MG: 10; 10 INJECTION, SOLUTION INFILTRATION; PERINEURAL at 12:43

## 2020-02-24 RX ADMIN — LIDOCAINE HYDROCHLORIDE 3 ML: 10 INJECTION INFILTRATION; PERINEURAL at 12:43

## 2020-02-24 RX ADMIN — GADOBUTROL 7 ML: 604.72 INJECTION INTRAVENOUS at 12:00

## 2020-02-24 NOTE — PROGRESS NOTES
Spoke with Mrs. Yaa Torres today briefly about . She was still undecided on if she wanted to participate in the study. She hadn't had a chance to speak to her  about the study and would like to do so before making her decision. I asked her if calling her back on Wednesday would give her enough time to speak with him and she said yes. I will follow-up with her then.

## 2020-02-24 NOTE — PROGRESS NOTES
1215- IV SL established without problem. Pt tolerated well. Resting on stretcher. 65- Dr. Maurizio Dawn speaking with pt and answering questions. 1225- Pt amb to MRI room for positioning and pre-procedural scanning. 1243- Time out done. Pt procedure confirmed. Dr. Maurizio Dawn begins invasive portion of right breast MRI guided biopsy. 1255- Biopsy complete. Pt tolerated procedure well. Specimens obtained and labeled accordingly. Pressure held to biopsy site immediately. 1300- Pt moved to stretcher in recovery area. No active bleeding noted. Resting comfortably. IV D/C'd intact without problem. 1305- Pt to mammo area for clip films. 1310- Pt returns. Bx site dressed with steri-strip, telfa, and hypafix. 6\" ace wrap snugly to chest with ice pack over bx site. D/C instructions reviewed with pt and copy given. Verbalized her understanding. D/C'd amb, stable, NAD. Specimens prepped for  p/u.

## 2020-02-25 ENCOUNTER — HOSPITAL ENCOUNTER (OUTPATIENT)
Dept: INTERVENTIONAL RADIOLOGY/VASCULAR | Age: 45
Discharge: HOME | End: 2020-02-25
Attending: SURGERY | Admitting: SURGERY
Payer: COMMERCIAL

## 2020-02-25 VITALS
DIASTOLIC BLOOD PRESSURE: 53 MMHG | WEIGHT: 168 LBS | OXYGEN SATURATION: 100 % | TEMPERATURE: 98.9 F | HEIGHT: 66 IN | RESPIRATION RATE: 14 BRPM | SYSTOLIC BLOOD PRESSURE: 128 MMHG | HEART RATE: 76 BPM | BODY MASS INDEX: 27 KG/M2

## 2020-02-25 DIAGNOSIS — C77.3 BREAST CANCER METASTASIZED TO AXILLARY LYMPH NODE, LEFT (HCC): ICD-10-CM

## 2020-02-25 DIAGNOSIS — C50.912 BREAST CANCER METASTASIZED TO AXILLARY LYMPH NODE, LEFT (HCC): ICD-10-CM

## 2020-02-25 LAB
25(OH)D3+25(OH)D2 SERPL-MCNC: 33.8 NG/ML (ref 30–100)
ALBUMIN SERPL-MCNC: 4.2 G/DL (ref 3.8–4.8)
ALBUMIN/GLOB SERPL: 1.4 {RATIO} (ref 1.2–2.2)
ALP SERPL-CCNC: 69 IU/L (ref 39–117)
ALT SERPL-CCNC: 13 IU/L (ref 0–32)
AST SERPL-CCNC: 18 IU/L (ref 0–40)
BASOPHILS # BLD AUTO: 0 X10E3/UL (ref 0–0.2)
BASOPHILS NFR BLD AUTO: 1 %
BILIRUB SERPL-MCNC: 0.2 MG/DL (ref 0–1.2)
BUN SERPL-MCNC: 20 MG/DL (ref 6–24)
BUN/CREAT SERPL: 23 (ref 9–23)
CALCIUM SERPL-MCNC: 9.5 MG/DL (ref 8.7–10.2)
CHLORIDE SERPL-SCNC: 100 MMOL/L (ref 96–106)
CO2 SERPL-SCNC: 21 MMOL/L (ref 20–29)
CREAT SERPL-MCNC: 0.88 MG/DL (ref 0.57–1)
EOSINOPHIL # BLD AUTO: 0 X10E3/UL (ref 0–0.4)
EOSINOPHIL NFR BLD AUTO: 1 %
ERYTHROCYTE [DISTWIDTH] IN BLOOD BY AUTOMATED COUNT: 11.8 % (ref 11.7–15.4)
GLOBULIN SER CALC-MCNC: 2.9 G/DL (ref 1.5–4.5)
GLUCOSE SERPL-MCNC: 96 MG/DL (ref 65–99)
HCT VFR BLD AUTO: 37.6 % (ref 34–46.6)
HGB BLD-MCNC: 12.8 G/DL (ref 11.1–15.9)
IMM GRANULOCYTES # BLD AUTO: 0 X10E3/UL (ref 0–0.1)
IMM GRANULOCYTES NFR BLD AUTO: 0 %
LYMPHOCYTES # BLD AUTO: 1.5 X10E3/UL (ref 0.7–3.1)
LYMPHOCYTES NFR BLD AUTO: 29 %
MCH RBC QN AUTO: 30.6 PG (ref 26.6–33)
MCHC RBC AUTO-ENTMCNC: 34 G/DL (ref 31.5–35.7)
MCV RBC AUTO: 90 FL (ref 79–97)
MONOCYTES # BLD AUTO: 0.5 X10E3/UL (ref 0.1–0.9)
MONOCYTES NFR BLD AUTO: 9 %
NEUTROPHILS # BLD AUTO: 3.1 X10E3/UL (ref 1.4–7)
NEUTROPHILS NFR BLD AUTO: 60 %
PLATELET # BLD AUTO: 216 X10E3/UL (ref 150–450)
POTASSIUM SERPL-SCNC: 4 MMOL/L (ref 3.5–5.2)
PROT SERPL-MCNC: 7.1 G/DL (ref 6–8.5)
RBC # BLD AUTO: 4.18 X10E6/UL (ref 3.77–5.28)
SODIUM SERPL-SCNC: 139 MMOL/L (ref 134–144)
WBC # BLD AUTO: 5.2 X10E3/UL (ref 3.4–10.8)

## 2020-02-25 PROCEDURE — 77030011893 HC TY CUT DN TRIS -B

## 2020-02-25 PROCEDURE — 77030010507 HC ADH SKN DERMBND J&J -B

## 2020-02-25 PROCEDURE — 74011000250 HC RX REV CODE- 250: Performed by: RADIOLOGY

## 2020-02-25 PROCEDURE — 77030031139 HC SUT VCRL2 J&J -A

## 2020-02-25 PROCEDURE — 74011250636 HC RX REV CODE- 250/636: Performed by: RADIOLOGY

## 2020-02-25 PROCEDURE — 74011000250 HC RX REV CODE- 250: Performed by: SURGERY

## 2020-02-25 PROCEDURE — 74011250636 HC RX REV CODE- 250/636

## 2020-02-25 PROCEDURE — C1788 PORT, INDWELLING, IMP: HCPCS

## 2020-02-25 PROCEDURE — C1894 INTRO/SHEATH, NON-LASER: HCPCS

## 2020-02-25 PROCEDURE — 36561 INSERT TUNNELED CV CATH: CPT

## 2020-02-25 RX ORDER — CEFAZOLIN SODIUM 1 G/3ML
2 INJECTION, POWDER, FOR SOLUTION INTRAMUSCULAR; INTRAVENOUS
Status: COMPLETED | OUTPATIENT
Start: 2020-02-25 | End: 2020-02-25

## 2020-02-25 RX ORDER — LIDOCAINE HYDROCHLORIDE AND EPINEPHRINE 10; 10 MG/ML; UG/ML
20 INJECTION, SOLUTION INFILTRATION; PERINEURAL
Status: DISCONTINUED | OUTPATIENT
Start: 2020-02-25 | End: 2020-02-25

## 2020-02-25 RX ORDER — LIDOCAINE HYDROCHLORIDE 20 MG/ML
20 INJECTION, SOLUTION INFILTRATION; PERINEURAL ONCE
Status: COMPLETED | OUTPATIENT
Start: 2020-02-25 | End: 2020-02-25

## 2020-02-25 RX ORDER — HEPARIN SODIUM,PORCINE/PF 10 UNIT/ML
10 SYRINGE (ML) INTRAVENOUS AS NEEDED
Status: DISCONTINUED | OUTPATIENT
Start: 2020-02-25 | End: 2020-02-25

## 2020-02-25 RX ORDER — HEPARIN 100 UNIT/ML
SYRINGE INTRAVENOUS
Status: COMPLETED
Start: 2020-02-25 | End: 2020-02-25

## 2020-02-25 RX ORDER — LIDOCAINE HYDROCHLORIDE AND EPINEPHRINE 10; 10 MG/ML; UG/ML
20 INJECTION, SOLUTION INFILTRATION; PERINEURAL
Status: DISCONTINUED | OUTPATIENT
Start: 2020-02-25 | End: 2020-02-25 | Stop reason: SDUPTHER

## 2020-02-25 RX ORDER — SODIUM CHLORIDE 9 MG/ML
25 INJECTION, SOLUTION INTRAVENOUS CONTINUOUS
Status: DISCONTINUED | OUTPATIENT
Start: 2020-02-25 | End: 2020-02-25

## 2020-02-25 RX ORDER — MIDAZOLAM HYDROCHLORIDE 1 MG/ML
5 INJECTION, SOLUTION INTRAMUSCULAR; INTRAVENOUS
Status: DISCONTINUED | OUTPATIENT
Start: 2020-02-25 | End: 2020-02-25

## 2020-02-25 RX ORDER — LIDOCAINE HYDROCHLORIDE AND EPINEPHRINE 10; 10 MG/ML; UG/ML
1.5 INJECTION, SOLUTION INFILTRATION; PERINEURAL ONCE
Status: DISCONTINUED | OUTPATIENT
Start: 2020-02-25 | End: 2020-02-25

## 2020-02-25 RX ORDER — FENTANYL CITRATE 50 UG/ML
100 INJECTION, SOLUTION INTRAMUSCULAR; INTRAVENOUS
Status: DISCONTINUED | OUTPATIENT
Start: 2020-02-25 | End: 2020-02-25

## 2020-02-25 RX ADMIN — LIDOCAINE HYDROCHLORIDE,EPINEPHRINE BITARTRATE 15 ML: 10; .01 INJECTION, SOLUTION INFILTRATION; PERINEURAL at 10:45

## 2020-02-25 RX ADMIN — FENTANYL CITRATE 25 MCG: 50 INJECTION, SOLUTION INTRAMUSCULAR; INTRAVENOUS at 10:38

## 2020-02-25 RX ADMIN — MIDAZOLAM HYDROCHLORIDE 1 MG: 1 INJECTION, SOLUTION INTRAMUSCULAR; INTRAVENOUS at 10:28

## 2020-02-25 RX ADMIN — SODIUM CHLORIDE 25 ML/HR: 900 INJECTION, SOLUTION INTRAVENOUS at 10:13

## 2020-02-25 RX ADMIN — FENTANYL CITRATE 25 MCG: 50 INJECTION, SOLUTION INTRAMUSCULAR; INTRAVENOUS at 10:28

## 2020-02-25 RX ADMIN — CEFAZOLIN 2 G: 330 INJECTION, POWDER, FOR SOLUTION INTRAMUSCULAR; INTRAVENOUS at 10:19

## 2020-02-25 RX ADMIN — MIDAZOLAM HYDROCHLORIDE 1 MG: 1 INJECTION, SOLUTION INTRAMUSCULAR; INTRAVENOUS at 10:46

## 2020-02-25 RX ADMIN — LIDOCAINE HYDROCHLORIDE 10 ML: 20 INJECTION, SOLUTION INFILTRATION; PERINEURAL at 10:00

## 2020-02-25 RX ADMIN — FENTANYL CITRATE 25 MCG: 50 INJECTION, SOLUTION INTRAMUSCULAR; INTRAVENOUS at 10:46

## 2020-02-25 RX ADMIN — Medication 300 UNITS: at 10:51

## 2020-02-25 RX ADMIN — MIDAZOLAM HYDROCHLORIDE 1 MG: 1 INJECTION, SOLUTION INTRAMUSCULAR; INTRAVENOUS at 10:40

## 2020-02-25 NOTE — DISCHARGE INSTRUCTIONS
[address] [facility]    Angiography Department      Radiologist:    Dr. Marina Kidney     Date:   2/25/2020      Portacath Discharge Instructions      Watch for signs of infection:    1. Redness,   2. Fever, chills,   3. Increased pain, and/or drainage from the site. If this occurs, call your physician at once. Continue your previous diet and restart your regularly prescribed medications. You may take Tylenol, as directed on the label, for pain if needed. Avoid ibuprofen (Advil, Motrin) and aspirin as they may cause you to bleed. Because you received sedation, you are not to drive or sign any legal documents for the next 24 hours. Do not lift anything heavier than 5 pounds with the affected arm and avoid pushing and pulling movements for several days. Side effects of sedation medications and other medications used today have been reviewed. Notify us of nausea, itching, hives, dizziness, or anything else out of the ordinary. Should you experience any of these significant changes, please call [phone] between the hours of 7:30 am and 10 pm or [phone] after hours. After hours, ask the  to page the X-ray Technologist, and describe the problem to the technologist.        Implanted Port Discharge Instructions      General Instructions:   A port is like an implanted IV. They are usually ordered for patients who will be getting chemotherapy, but can also be used as an IV for long term antibiotics, large amounts of fluids, and/or blood products. Your blood can be drawn from your port for labs also. Those patients who do not have good veins find the ports convenient as they can get the IV they need with one stick. The port can be used long term, and the care is easy.  The device is under the skin, and once the skin heals, care is minimal. All that is required is the nurse who accesses the port will need to flush it with heparinized saline after each use. Ports are usually placed in the chest wall, usually on the right side. But they can be place in the arms and in the abdomen. Home Care Instructions: If your port is in your arm, do not allow blood pressure or other IVs to be place in that arm. Do not allow bra straps or any clothing to rub the skin over the port. Do not bathe or swim until the skin has healed and if the port is accessed. Once it is healed, and when the port is not accessed, it is okay to bathe and swim. Restrict yourself to light activity for the first 5 days after getting the port put in, after that, resume normal activity slowly. You may resume your normal diet and medications. Follow-Up Instructions: Please see your oncologist, or whatever physician ordered the port as he/she has requested of you. Call If: You should call your Physician and/or the Radiology Nurse if you notice redness, pus, swelling, or pain from the area of your incision. Call if you should develop a fever. The nurses who access your port will know to call your doctor if the port does not seem to be working properly. You need to tell the nurses who use the port if you should have any pain or swelling at the site during an infusion.     To Reach Us:       Patient Signature:  Date: 2/25/2020  Discharging Nurse: Tami John RN

## 2020-02-25 NOTE — PROGRESS NOTES
1141 am- Discharge instructions reviewed with patient and family with good understanding. IV discontinued. Skin glue to right upper chest and neck remains dry and intact. Patient taken to car via wheelchair with tech at side. Patient discharged stable.

## 2020-02-25 NOTE — PROGRESS NOTES
Pt arrives ambulate to angio department accompanied by  for Angio procedure. All assessments completed and consent was reviewed. Education given was regarding procedure, conscious sedation, post-procedure care and  management/follow-up. Opportunity for questions was provided and all questions and concerns were addressed. Reviewed sedation plan to include medicating under conscious sedation using versed and fentanyl IV. Reviewed potential side effects with patient and possible interactions with patient's current meds Pt educated on moderate sedation and its purpose; medications and side effects described and patient verbalized understanding. Dr. Rojas Screws in to talk with patient and  about procedure. Patient assessed and evaluated by Dr. Yudith Charles for procedure. Consent signed.

## 2020-02-25 NOTE — H&P
Interventional and Vascular Radiology History and Physical    Patient: Chris Crisostomo 53 y.o. female       Chief Complaint: No chief complaint on file. History of Present Illness: chemotherapy     History:    Past Medical History:   Diagnosis Date    Anxiety and depression     [facility]    left breast ca er/pr (-) her 2 (+)     Family History   Problem Relation Age of Onset    Hypertension Mother     Kidney Disease Mother     Thyroid Disease Mother      Social History     Socioeconomic History    Marital status:      Spouse name: Not on file    Number of children: Not on file    Years of education: Not on file    Highest education level: Not on file   Occupational History    Not on file   Social Needs    Financial resource strain: Not on file    Food insecurity:     Worry: Not on file     Inability: Not on file    Transportation needs:     Medical: Not on file     Non-medical: Not on file   Tobacco Use    Smoking status: Never Smoker    Smokeless tobacco: Never Used   Substance and Sexual Activity    Alcohol use: No    Drug use: No    Sexual activity: Not on file   Lifestyle    Physical activity:     Days per week: Not on file     Minutes per session: Not on file    Stress: Not on file   Relationships    Social connections:     Talks on phone: Not on file     Gets together: Not on file     Attends [employer] service: Not on file     Active member of club or organization: Not on file     Attends meetings of clubs or organizations: Not on file     Relationship status: Not on file    Intimate partner violence:     Fear of current or ex partner: Not on file     Emotionally abused: Not on file     Physically abused: Not on file     Forced sexual activity: Not on file   Other Topics Concern    Not on file   Social History Narrative    Not on file       Allergies: No Known Allergies    Current Medications:  No current facility-administered medications for this encounter. Physical Exam:  Blood pressure 128/53, pulse 76, temperature 98.9 °F (37.2 °C), resp. rate 14, height 5' 6\" (1.676 m), weight 76.2 kg (168 lb), SpO2 100 %, not currently breastfeeding. LUNG: clear to auscultation bilaterally, HEART: regular rate and rhythm, S1, S2 normal, no murmur, click, rub or gallop      Alerts:    Hospital Problems  Date Reviewed: 2/17/2020    None          Laboratory:      Recent Labs     02/24/20  1336   HGB 12.8   HCT 37.6   WBC 5.2      BUN 20   CREA 0.88   K 4.0         Plan of Care/Planned Procedure:  Risks, benefits, and alternatives reviewed with patient and she agrees to proceed with the procedure. Conscious sedation will be performed with IV fentanyl and versed.  Plan is for right chest port placement       Aden Boss MD

## 2020-02-26 ENCOUNTER — RESEARCH ENCOUNTER (OUTPATIENT)
Dept: ONCOLOGY | Age: 45
End: 2020-02-26

## 2020-02-26 NOTE — PROGRESS NOTES
I spoke with Mrs. Jeff Belle, we discussed the study further and I answered the questions she had regarding the study. She decided to decline to participate in . She stated that she is very overwhelmed with everything going on right now and does not want to think about adding and add anything else to her regimen or plate right now.

## 2020-02-28 ENCOUNTER — OFFICE VISIT (OUTPATIENT)
Dept: ONCOLOGY | Age: 45
End: 2020-02-28

## 2020-02-28 ENCOUNTER — DOCUMENTATION ONLY (OUTPATIENT)
Dept: ONCOLOGY | Age: 45
End: 2020-02-28

## 2020-02-28 ENCOUNTER — TELEPHONE (OUTPATIENT)
Dept: SURGERY | Age: 45
End: 2020-02-28

## 2020-02-28 ENCOUNTER — HOSPITAL ENCOUNTER (OUTPATIENT)
Dept: INFUSION THERAPY | Age: 45
Discharge: HOME | End: 2020-02-28
Payer: COMMERCIAL

## 2020-02-28 VITALS
BODY MASS INDEX: 26.84 KG/M2 | SYSTOLIC BLOOD PRESSURE: 111 MMHG | TEMPERATURE: 98 F | OXYGEN SATURATION: 96 % | HEIGHT: 66 IN | DIASTOLIC BLOOD PRESSURE: 72 MMHG | HEART RATE: 76 BPM | WEIGHT: 167 LBS

## 2020-02-28 VITALS
OXYGEN SATURATION: 97 % | WEIGHT: 167.1 LBS | HEART RATE: 73 BPM | TEMPERATURE: 98.2 F | HEIGHT: 66 IN | BODY MASS INDEX: 26.86 KG/M2 | DIASTOLIC BLOOD PRESSURE: 70 MMHG | SYSTOLIC BLOOD PRESSURE: 105 MMHG

## 2020-02-28 DIAGNOSIS — C50.412 MALIGNANT NEOPLASM OF UPPER-OUTER QUADRANT OF LEFT BREAST IN FEMALE, ESTROGEN RECEPTOR NEGATIVE (HCC): Primary | ICD-10-CM

## 2020-02-28 DIAGNOSIS — C50.912 MALIGNANT NEOPLASM OF LEFT BREAST IN FEMALE, ESTROGEN RECEPTOR NEGATIVE, UNSPECIFIED SITE OF BREAST (HCC): Primary | ICD-10-CM

## 2020-02-28 DIAGNOSIS — Z09 CHEMOTHERAPY FOLLOW-UP EXAMINATION: ICD-10-CM

## 2020-02-28 DIAGNOSIS — Z17.1 MALIGNANT NEOPLASM OF UPPER-OUTER QUADRANT OF LEFT BREAST IN FEMALE, ESTROGEN RECEPTOR NEGATIVE (HCC): Primary | ICD-10-CM

## 2020-02-28 DIAGNOSIS — Z17.1 MALIGNANT NEOPLASM OF LEFT BREAST IN FEMALE, ESTROGEN RECEPTOR NEGATIVE, UNSPECIFIED SITE OF BREAST (HCC): Primary | ICD-10-CM

## 2020-02-28 DIAGNOSIS — C50.912 HER2-POSITIVE CARCINOMA OF LEFT BREAST (HCC): ICD-10-CM

## 2020-02-28 LAB
ALBUMIN SERPL-MCNC: 3.4 G/DL (ref 3.5–5)
ALBUMIN/GLOB SERPL: 0.9 {RATIO} (ref 1.1–2.2)
ALP SERPL-CCNC: 74 U/L (ref 45–117)
ALT SERPL-CCNC: 23 U/L (ref 12–78)
ANION GAP SERPL CALC-SCNC: 4 MMOL/L (ref 5–15)
AST SERPL-CCNC: 20 U/L (ref 15–37)
BASOPHILS # BLD: 0 K/UL (ref 0–0.1)
BASOPHILS NFR BLD: 0 % (ref 0–1)
BILIRUB SERPL-MCNC: 0.3 MG/DL (ref 0.2–1)
BUN SERPL-MCNC: 20 MG/DL (ref 6–20)
BUN/CREAT SERPL: 19 (ref 12–20)
CALCIUM SERPL-MCNC: 8.9 MG/DL (ref 8.5–10.1)
CHLORIDE SERPL-SCNC: 106 MMOL/L (ref 97–108)
CO2 SERPL-SCNC: 26 MMOL/L (ref 21–32)
CREAT SERPL-MCNC: 1.03 MG/DL (ref 0.55–1.02)
DIFFERENTIAL METHOD BLD: ABNORMAL
EOSINOPHIL # BLD: 0.1 K/UL (ref 0–0.4)
EOSINOPHIL NFR BLD: 1 % (ref 0–7)
ERYTHROCYTE [DISTWIDTH] IN BLOOD BY AUTOMATED COUNT: 11.9 % (ref 11.5–14.5)
GLOBULIN SER CALC-MCNC: 3.9 G/DL (ref 2–4)
GLUCOSE SERPL-MCNC: 107 MG/DL (ref 65–100)
HCT VFR BLD AUTO: 35 % (ref 35–47)
HGB BLD-MCNC: 12.1 G/DL (ref 11.5–16)
IMM GRANULOCYTES # BLD AUTO: 0 K/UL (ref 0–0.04)
IMM GRANULOCYTES NFR BLD AUTO: 0 % (ref 0–0.5)
LYMPHOCYTES # BLD: 0.6 K/UL (ref 0.8–3.5)
LYMPHOCYTES NFR BLD: 9 % (ref 12–49)
MCH RBC QN AUTO: 30.5 PG (ref 26–34)
MCHC RBC AUTO-ENTMCNC: 34.6 G/DL (ref 30–36.5)
MCV RBC AUTO: 88.2 FL (ref 80–99)
MONOCYTES # BLD: 0.2 K/UL (ref 0–1)
MONOCYTES NFR BLD: 3 % (ref 5–13)
NEUTS SEG # BLD: 5.8 K/UL (ref 1.8–8)
NEUTS SEG NFR BLD: 87 % (ref 32–75)
NRBC # BLD: 0 K/UL (ref 0–0.01)
NRBC BLD-RTO: 0 PER 100 WBC
PLATELET # BLD AUTO: 169 K/UL (ref 150–400)
PMV BLD AUTO: 11 FL (ref 8.9–12.9)
POTASSIUM SERPL-SCNC: 4.2 MMOL/L (ref 3.5–5.1)
PROT SERPL-MCNC: 7.3 G/DL (ref 6.4–8.2)
RBC # BLD AUTO: 3.97 M/UL (ref 3.8–5.2)
RBC MORPH BLD: ABNORMAL
SODIUM SERPL-SCNC: 136 MMOL/L (ref 136–145)
WBC # BLD AUTO: 6.7 K/UL (ref 3.6–11)

## 2020-02-28 PROCEDURE — 96377 APPLICATON ON-BODY INJECTOR: CPT

## 2020-02-28 PROCEDURE — 77030012965 HC NDL HUBR BBMI -A

## 2020-02-28 PROCEDURE — 74011250636 HC RX REV CODE- 250/636: Performed by: INTERNAL MEDICINE

## 2020-02-28 PROCEDURE — 96375 TX/PRO/DX INJ NEW DRUG ADDON: CPT

## 2020-02-28 PROCEDURE — 80053 COMPREHEN METABOLIC PANEL: CPT

## 2020-02-28 PROCEDURE — 74011250636 HC RX REV CODE- 250/636: Performed by: NURSE PRACTITIONER

## 2020-02-28 PROCEDURE — 36415 COLL VENOUS BLD VENIPUNCTURE: CPT

## 2020-02-28 PROCEDURE — 96367 TX/PROPH/DG ADDL SEQ IV INF: CPT

## 2020-02-28 PROCEDURE — 96413 CHEMO IV INFUSION 1 HR: CPT

## 2020-02-28 PROCEDURE — 85025 COMPLETE CBC W/AUTO DIFF WBC: CPT

## 2020-02-28 PROCEDURE — 74011000258 HC RX REV CODE- 258: Performed by: INTERNAL MEDICINE

## 2020-02-28 PROCEDURE — 96415 CHEMO IV INFUSION ADDL HR: CPT

## 2020-02-28 PROCEDURE — 96417 CHEMO IV INFUS EACH ADDL SEQ: CPT

## 2020-02-28 RX ORDER — HEPARIN 100 UNIT/ML
300-500 SYRINGE INTRAVENOUS AS NEEDED
Status: ACTIVE | OUTPATIENT
Start: 2020-02-28 | End: 2020-02-28

## 2020-02-28 RX ORDER — PALONOSETRON 0.05 MG/ML
0.25 INJECTION, SOLUTION INTRAVENOUS ONCE
Status: COMPLETED | OUTPATIENT
Start: 2020-02-28 | End: 2020-02-28

## 2020-02-28 RX ORDER — DEXAMETHASONE SODIUM PHOSPHATE 4 MG/ML
10 INJECTION, SOLUTION INTRA-ARTICULAR; INTRALESIONAL; INTRAMUSCULAR; INTRAVENOUS; SOFT TISSUE ONCE
Status: DISPENSED | OUTPATIENT
Start: 2020-02-28 | End: 2020-02-28

## 2020-02-28 RX ORDER — SODIUM CHLORIDE 0.9 % (FLUSH) 0.9 %
10 SYRINGE (ML) INJECTION AS NEEDED
Status: DISPENSED | OUTPATIENT
Start: 2020-02-28 | End: 2020-02-28

## 2020-02-28 RX ORDER — SODIUM CHLORIDE 9 MG/ML
25 INJECTION, SOLUTION INTRAVENOUS CONTINUOUS
Status: DISPENSED | OUTPATIENT
Start: 2020-02-28 | End: 2020-02-28

## 2020-02-28 RX ADMIN — SODIUM CHLORIDE 150 MG: 900 INJECTION, SOLUTION INTRAVENOUS at 14:35

## 2020-02-28 RX ADMIN — CARBOPLATIN 700 MG: 10 INJECTION, SOLUTION INTRAVENOUS at 17:05

## 2020-02-28 RX ADMIN — PERTUZUMAB 840 MG: 30 INJECTION, SOLUTION, CONCENTRATE INTRAVENOUS at 13:15

## 2020-02-28 RX ADMIN — PEGFILGRASTIM 6 MG: KIT SUBCUTANEOUS at 17:38

## 2020-02-28 RX ADMIN — Medication 500 UNITS: at 17:38

## 2020-02-28 RX ADMIN — DOCETAXEL ANHYDROUS 142 MG: 10 INJECTION, SOLUTION INTRAVENOUS at 15:51

## 2020-02-28 RX ADMIN — Medication 90 ML: at 17:37

## 2020-02-28 RX ADMIN — TRASTUZUMAB 600 MG: 150 INJECTION, POWDER, LYOPHILIZED, FOR SOLUTION INTRAVENOUS at 11:19

## 2020-02-28 RX ADMIN — SODIUM CHLORIDE 25 ML/HR: 900 INJECTION, SOLUTION INTRAVENOUS at 11:13

## 2020-02-28 RX ADMIN — PALONOSETRON 0.25 MG: 0.05 INJECTION, SOLUTION INTRAVENOUS at 15:31

## 2020-02-28 RX ADMIN — DEXAMETHASONE SODIUM PHOSPHATE 12 MG: 4 INJECTION, SOLUTION INTRA-ARTICULAR; INTRALESIONAL; INTRAMUSCULAR; INTRAVENOUS; SOFT TISSUE at 15:00

## 2020-02-28 NOTE — TELEPHONE ENCOUNTER
Called patient with result of genetic test which is negative. She is happy to hear this. Confirmed her mailing address so that I can send her a copy. She has chemo today.

## 2020-02-28 NOTE — PROGRESS NOTES
[facility]  [address], [address] [place] [address] Derek Samuel, [facility]  [phone]       Oncology Progress note        Patient: Claudio Zurita MRN: [id number]  SSN: [id number]    YOB: 1975  Age: 53 y.o. Sex: female        Diagnosis:     1. Left breast carcinoma: Dx: 1/31/2020  T2 N1 M0 (Stage IIA) Invasive ductal carcinoma, Tumor size 2 cm, grade 3, ER 0%, ME 0%, Her 2 3+     Treatment:     1. Neoadjuvant chemotherapy   Docetaxel, Carboplatin, Trastuzumab + Pertuzumab   Cycle 1 day 1    Subjective:      Claudio Zurita is a 53 y.o. female who I am seeing in follow up for a new diagnosis of left sided invasive breast carcinoma. She felt a lump in her left breast. Dr. Sobeida Lin obtained a diagnostic mammogram. The mammogram showed an abnormality in the upper quadrant of the left breast. A biopsy of the lesion revealed a ER 0% ME 0% and Her 2 3+ Gr 3 IDC of the left breast.     Ms. Amanda Carpio returns today to start neoadjuvant treatment. She was previously educated on treatment and has all medications at home for nausea and diarrhea.        Review of Systems:    Constitutional: negative  Eyes: negative  Ears, Nose, Mouth, Throat, and Face: negative  Respiratory: negative  Cardiovascular: negative  Gastrointestinal: negative  Genitourinary:negative  Integument/Breast: negative  Hematologic/Lymphatic: negative  Musculoskeletal:negative  Neurological: negative    Past Medical History:   Diagnosis Date    Anxiety and depression     Cancer ([address] 01/2020    left breast ca er/pr (-) her 2 (+)     Past Surgical History:   Procedure Laterality Date    HX BREAST BIOPSY Left 07/24/2018    Left sebaceous cyst surgically removed    HX CYST INCISION AND DRAINAGE Left 20+ yrs ago    HX HEENT      tonsillectomy    IR INSERT TUNL CVC W PORT OVER 5 YEARS  2/25/2020      Family History   Problem Relation Age of Onset    Hypertension Mother    Roland Paige Kidney Disease Mother     Thyroid Disease Mother      Social History     Tobacco Use    Smoking status: Never Smoker    Smokeless tobacco: Never Used   Substance Use Topics    Alcohol use: No      Prior to Admission medications    Medication Sig Start Date End Date Taking? Authorizing Provider   ondansetron (ZOFRAN ODT) 4 mg disintegrating tablet Take 1 Tab by mouth every eight (8) hours as needed for Nausea or Vomiting. 2/11/20  Yes Mikayla Marquez NP   prochlorperazine (COMPAZINE) 10 mg tablet Take 0.5 Tabs by mouth every six (6) hours as needed for Nausea for up to 30 doses. 2/11/20  Yes Mikayla Marquez NP   diphenoxylate-atropine (LOMOTIL) 2.5-0.025 mg per tablet Take 1 Tab by mouth four (4) times daily as needed for Diarrhea. Max Daily Amount: 4 Tabs. 2/11/20  Yes Mikayla Marquez NP   lidocaine-prilocaine (EMLA) topical cream Apply  to affected area as needed for Pain. 2/11/20  Yes Mikayla Marquez NP   dexAMETHasone (DECADRON) 4 mg tablet Take 8 mg by mouth two (2) times daily (with meals). The day of chemotherapy and the day after chemotherapy. 2/11/20  Yes Mikayla Marquez NP   VIIBRYD 40 mg tab tablet TAKE 1 TABLET BY MOUTH EVERY DAY 6/19/18  Yes Provider, Historical   ethinyl estradiol-etonogestrel (NUVARING) 0.12-0.015 mg/24 hr vaginal ring by Intravaginal route. Provider, Historical   ALPRAZolam (XANAX) 0.5 mg tablet 0.5 mg as needed. 1/21/16   Provider, Historical          No Known Allergies        Objective:     Vitals:    02/28/20 0944   BP: 111/72   Pulse: 76   Temp: 98 °F (36.7 °C)   TempSrc: Oral   SpO2: 96%   Weight: 167 lb (75.8 kg)   Height: 5' 6\" (1.676 m)      Pain Scale: 2/10      Physical Exam:    GENERAL: alert, cooperative, no distress, appears stated age  EYE: conjunctivae/corneas clear. PERRL, EOM's intact  LYMPHATIC: Cervical, supraclavicular, and axillary nodes normal.   THROAT & NECK: normal and no erythema or exudates noted.    LUNG: clear to auscultation bilaterally  HEART: regular rate and rhythm, S1, S2 normal, no murmur, click, rub or gallop  ABDOMEN: soft, non-tender. Bowel sounds normal. No masses,  no organomegaly  EXTREMITIES:  extremities normal, atraumatic, no cyanosis or edema  SKIN: Normal.  NEUROLOGIC: AOx3. Gait normal. Reflexes and motor strength normal and symmetric. Cranial nerves 2-12 and sensation grossly intact. Lab Results   Component Value Date/Time    WBC 6.7 02/28/2020 09:08 AM    HGB 12.1 02/28/2020 09:08 AM    HCT 35.0 02/28/2020 09:08 AM    PLATELET 388 45/20/2870 09:08 AM    MCV 88.2 02/28/2020 09:08 AM       Lab Results   Component Value Date/Time    Sodium 136 02/28/2020 09:08 AM    Potassium 4.2 02/28/2020 09:08 AM    Chloride 106 02/28/2020 09:08 AM    CO2 26 02/28/2020 09:08 AM    Anion gap 4 (L) 02/28/2020 09:08 AM    Glucose 107 (H) 02/28/2020 09:08 AM    BUN 20 02/28/2020 09:08 AM    Creatinine 1.03 (H) 02/28/2020 09:08 AM    BUN/Creatinine ratio 19 02/28/2020 09:08 AM    GFR est AA >60 02/28/2020 09:08 AM    GFR est non-AA 58 (L) 02/28/2020 09:08 AM    Calcium 8.9 02/28/2020 09:08 AM    Bilirubin, total 0.3 02/28/2020 09:08 AM    AST (SGOT) 20 02/28/2020 09:08 AM    Alk. phosphatase 74 02/28/2020 09:08 AM    Protein, total 7.3 02/28/2020 09:08 AM    Albumin 3.4 (L) 02/28/2020 09:08 AM    Globulin 3.9 02/28/2020 09:08 AM    A-G Ratio 0.9 (L) 02/28/2020 09:08 AM    ALT (SGPT) 23 02/28/2020 09:08 AM           Assessment:     1. Left breast carcinoma:  T2 N1 M0 (Stage IIA) Invasive ductal carcinoma, Tumor size 2 cm, grade 3, ER 0%, NH 0%, Her 2 3+        ECOG PS 0  Intent of Treatment - curative  Prognosis: excellent    The most effective combination for neoadjuvant treatment for Her 2 +ve locally advanced breast cancer is dual Her blockade with Pertuzumab/Herceptin in combination with Taxanes. There are two trials which have displayed a high response rate with dual Her blockade.      Starting neoadjuvant treatment with Docetaxel, Carboplatin, Trastuzumab + Pertuzumab   Cycle 1 day 1    I explained the patient the potential side effect of the the treatment and ways to manage it. She vocalized understanding. Blood counts are acceptable. Results reviewed with the patient. Symptom management form reviewed and scanned into the EMR under Media. Plan:       · Proceed with C#1 TCH-P (Trastuzumab 8mg/kg load with cycle 1 then 6mg/kg, Docetaxel 75mg/m2, Carboplatin AUC 6, Pertuzumab 840mg load with cycle 1 then 420mg) given every 3 weeks x 6 cycles  · Labs: CBC, CMP prior to each treatment. Troponin, proBNP prior to treatment and every 12 weeks  · Antiemetic Prophylaxis: Palonosetron and dexamethasone prior to chemo  · PRN Antiemetics: Ondansetron, Compazine  · Swelling prophylaxis: Dexmethasone 8mg bid the day before, day of and day after chemotherapy  · TTE prior to chemotherapy and every 12 weeks while on Trastuzumab  · Neulasta 24-72 hours after each treatment  · Rx for cranial prosthesis given to patient  · Return to clinic 3 weeks      I saw the patient in conjunction with SULAIMAN Fung. Signed by: Ericka Paulino MD                     February 28, 2020      CC. Manan Pink MD  CC.  Juju Ramey MD

## 2020-02-28 NOTE — PROGRESS NOTES
[facility] - Chemotherapy Progress Note    0900 - Pt admit to [facility] for [facility] ambulatory in stable condition. Assessment completed. No new concerns voiced. R chest PAC accessed with 0.75\" Sudhakar Polkow with positive blood return. Labs drawn, per MD order and sent to lab. Chemotherapy Flowsheet 2/28/2020   Cycle C1D1   Date 2/28/2020   Drug / Regimen TCHP   Pre Meds given   Notes given     0920 - To Dr. Anthony Acosta office for scheduled appt. Visit Vitals  /70 (BP 1 Location: Left arm, BP Patient Position: Sitting)   Pulse 73   Temp 98.2 °F (36.8 °C)   Ht 5' 6\" (1.676 m)   Wt 75.8 kg (167 lb 1.6 oz)   SpO2 97%   Breastfeeding No   BMI 26.97 kg/m²       Recent Results (from the past 12 hour(s))   CBC WITH AUTOMATED DIFF    Collection Time: 02/28/20  9:08 AM   Result Value Ref Range    WBC 6.7 3.6 - 11.0 K/uL    RBC 3.97 3.80 - 5.20 M/uL    HGB 12.1 11.5 - 16.0 g/dL    HCT 35.0 35.0 - 47.0 %    MCV 88.2 80.0 - 99.0 FL    MCH 30.5 26.0 - 34.0 PG    MCHC 34.6 30.0 - 36.5 g/dL    RDW 11.9 11.5 - 14.5 %    PLATELET 282 199 - 897 K/uL    MPV 11.0 8.9 - 12.9 FL    NRBC 0.0 0  WBC    ABSOLUTE NRBC 0.00 0.00 - 0.01 K/uL    NEUTROPHILS 87 (H) 32 - 75 %    LYMPHOCYTES 9 (L) 12 - 49 %    MONOCYTES 3 (L) 5 - 13 %    EOSINOPHILS 1 0 - 7 %    BASOPHILS 0 0 - 1 %    IMMATURE GRANULOCYTES 0 0.0 - 0.5 %    ABS. NEUTROPHILS 5.8 1.8 - 8.0 K/UL    ABS. LYMPHOCYTES 0.6 (L) 0.8 - 3.5 K/UL    ABS. MONOCYTES 0.2 0.0 - 1.0 K/UL    ABS. EOSINOPHILS 0.1 0.0 - 0.4 K/UL    ABS. BASOPHILS 0.0 0.0 - 0.1 K/UL    ABS. IMM.  GRANS. 0.0 0.00 - 0.04 K/UL    DF AUTOMATED      RBC COMMENTS NORMOCYTIC, NORMOCHROMIC     METABOLIC PANEL, COMPREHENSIVE    Collection Time: 02/28/20  9:08 AM   Result Value Ref Range    Sodium 136 136 - 145 mmol/L    Potassium 4.2 3.5 - 5.1 mmol/L    Chloride 106 97 - 108 mmol/L    CO2 26 21 - 32 mmol/L    Anion gap 4 (L) 5 - 15 mmol/L    Glucose 107 (H) 65 - 100 mg/dL    BUN 20 6 - 20 MG/DL Creatinine 1.03 (H) 0.55 - 1.02 MG/DL    BUN/Creatinine ratio 19 12 - 20      GFR est AA >60 >60 ml/min/1.73m2    GFR est non-AA 58 (L) >60 ml/min/1.73m2    Calcium 8.9 8.5 - 10.1 MG/DL    Bilirubin, total 0.3 0.2 - 1.0 MG/DL    ALT (SGPT) 23 12 - 78 U/L    AST (SGOT) 20 15 - 37 U/L    Alk. phosphatase 74 45 - 117 U/L    Protein, total 7.3 6.4 - 8.2 g/dL    Albumin 3.4 (L) 3.5 - 5.0 g/dL    Globulin 3.9 2.0 - 4.0 g/dL    A-G Ratio 0.9 (L) 1.1 - 2.2         Medications:  NS KVO  Herceptin 600mg over 90 min. Perjeta 840mg over 60 min. Emend 150mg over 20min. Decadron 12mg over 15 min. Aloxi IVP   Taxotere 142mg over 1hr  Carbo 641mg over over 30min. Neulasta OB    1720 - Pt tolerated treatment well. Port maintained positive blood return throughout treatment. Flushed, heparinized and de-accessed per protocol. D/c home ambulatory in no distress. Pt aware of next appointment scheduled for 3/20/20 at 0900.

## 2020-02-28 NOTE — PROGRESS NOTES
Jose F Figueroa is a 53 y.o[place] female  Here for:  Chief Complaint   Patient presents with    Breast Cancer     (L)    Chemotherapy     1. Have you been to the ER, urgent care clinic since your last visit? Hospitalized since your last visit? No    2. Have you seen or consulted any other health care providers outside of the [address] [place] [address] since your last visit? Include any pap smears or colon screening.  No

## 2020-02-28 NOTE — PROGRESS NOTES
Oncology Navigator  Psychosocial Assessment    Reason for Assessment:    []Depression  []Anxiety  []Caregiver [place]  []Maladaptive Coping with Serious Illness   [] Social Work Referral [x] Initial Assessment  [] Other     Sources of Information:    [x]Patient  [x]Family  [x]Staff  [x]Medical Record    Advance Care Planning:  No flowsheet data found.     Mental Status:    [x]Alert  []Lethargic  []Unresponsive   [] Unable to assess   Oriented to:  [x]Person  [x]Place  [x]Time  [x]Situation      Barriers to Learning:    []Language  []Developmental  []Cognitive  []Altered Mental Status  []Visual/Hearing Impairment  []Unable to Read/Write  []Motivational   [x]No Barriers Identified  []Other:    Relationship Status:  []Single  [x]  []Significant Other/Life Partner  []  []  []      Living Circumstances:  []Lives Alone  [x]Family/Significant Other in Household  []Roommates  [x]Children in the Home  []Paid Caregivers  []Assisted Living Facility/Group Home  []Skilled [address]  []Homeless  []Incarcerated  []Environmental/Care Concerns  []other:    Employment Status:  [x]Employed Full-time []Employed Part-time []Homemaker [] Disabled  [] Retired []Other:    Support System:    [x]Strong  []Fair  []Limited    Financial/Legal Concerns:    []Uninsured  [x]Limited Income/Resources  []Non-Citizen  []No Concerns Identified  []Financial POA:    []Other:    Sabianism/Spiritual/Existential:  [x]Strong Sense of Spirituality  [x]Involved in [employer]  []Request  Visit  []Expressing Romario Ort  []No Concerns Identified    Coping with Illness:         Patient: Family/Caregiver:   Understanding and Acceptance of Illness/Prognosis  [x] [x]   Strong Sense of Resilience [x] [x]   Self Reflection [x] [x]   Engaged Support System [x] [x]   Does not Readily Discuss Illness [] []   Denial of Terminal Status [] []   Anger [] []   Depression [] []   Anxiety/Fear [x] []   Bargaining [] []   Recent Diagnosis/Prognosis [x] []   Difficulties with Body Image [] []   Loss of Identity [] []   Excessive Substance Use [] []   Mental Health History [] []   Enmeshed Relationships [] []   History of Loss [] []   Anticipatory Grief [] []   Concern for Complicated Grief [] []   Suicidal Ideation or Plan [] []   Unable to assess [] []            Narrative:  Met with patient  and her spouse  to introduce social work navigator role and supports. Pt  for 25 years in May. Pt and spouse have 4 children  ( 24, 25, 15 ( dtr), and 12- (3 boys, 1 dtr). PT works as a middle school interventionist in [employer] and spouse is a  a [employer] Assessment/Action:   1. Introduce self and role of the  in the [address] [place] [address] Dr. 2. Informed the patient of the [facility] and available resources there. 3. Continue to meet with the patient when she returns to the clinic for ongoing assessment of the patient's adjustment to her  diagnosis and treatment. 4. Ongoing psychosocial support as desired by patient. Plan/Referral:  Complementary therapies referral  Insurance/Entitlements referral   Has high deductibles it seems   Financial/Medication assistance referral   Care Card Application - waiting on paystubs and bank statements. Reshma Rabago.  Moraima Caballero, KIMMY/FRANKLIN  Supervisee in Social Work

## 2020-03-04 DIAGNOSIS — Z51.11: ICD-10-CM

## 2020-03-04 DIAGNOSIS — C50.912 MALIGNANT NEOPLASM OF LEFT BREAST IN FEMALE, ESTROGEN RECEPTOR NEGATIVE, UNSPECIFIED SITE OF BREAST (HCC): ICD-10-CM

## 2020-03-04 DIAGNOSIS — R30.0 DYSURIA: ICD-10-CM

## 2020-03-04 DIAGNOSIS — Z17.1 MALIGNANT NEOPLASM OF LEFT BREAST IN FEMALE, ESTROGEN RECEPTOR NEGATIVE, UNSPECIFIED SITE OF BREAST (HCC): ICD-10-CM

## 2020-03-04 DIAGNOSIS — R30.0 DYSURIA: Primary | ICD-10-CM

## 2020-03-05 ENCOUNTER — OFFICE VISIT (OUTPATIENT)
Dept: SURGERY | Age: 45
End: 2020-03-05

## 2020-03-05 VITALS — HEIGHT: 66 IN | WEIGHT: 167 LBS | BODY MASS INDEX: 26.84 KG/M2

## 2020-03-05 DIAGNOSIS — C50.912 MALIGNANT NEOPLASM OF LEFT FEMALE BREAST, UNSPECIFIED ESTROGEN RECEPTOR STATUS, UNSPECIFIED SITE OF BREAST (HCC): Primary | ICD-10-CM

## 2020-03-05 NOTE — PATIENT INSTRUCTIONS
Learning About Breast Cancer Treatments  Your Care Instructions    Breast cancer means abnormal cells grow out of control in one or both breasts. These cancer cells can spread from the breast to nearby lymph nodes and other tissues. They can also spread to other parts of the body. The type and stage of cancer you have is based on:  · Where in the breast the cancer started. · The genetics of those cancer cells. · How far cancer has spread within the breast, to nearby tissues, or to other organs. · What the cancer cells look like under a microscope. · Whether there is cancer in the nearby lymph nodes. Tests that help find the stage of your cancer can help choose the right treatment for you. These tests can include a breast biopsy, lymph node biopsy, blood tests, and X-rays. You may need other tests as well, such as a bone, CT, or MRI scan. Whether you have more tests depends on your symptoms and the stage of the cancer. When you find out that you have cancer, you may feel many emotions and may need some help coping. Seek out family, friends, and counselors for support. You also can do things at home to make yourself feel better while you go through treatment. Call the [employer] (4-804.545.2436) or visit its website at [employer] [email or web address] for more information. How is breast cancer treated? Your doctor may combine treatments. This is a common way to treat breast cancer. Treatment depends on what type and stage of cancer you have. You may have:  · Surgery to remove the cancer. · Radiation. This uses high-dose X-rays to kill cancer cells and shrink tumors. · Chemotherapy. This uses medicine to kill cancer cells. · Hormone therapy. This uses medicines such as tamoxifen. It limits the effect of the hormone estrogen. This hormone can help some types of breast cancer cells to grow. · Biological therapy.  This uses medicines such as trastuzumab (Herceptin) to help your immune system fight the cancer. What surgeries are done for breast cancer? Surgery is a key part of treatment for breast cancer. The main types of surgeries are:  · Breast-conserving surgery, such as lumpectomy. It removes the cancer in the breast and just enough tissue to make sure that all of the cancer was removed. · Surgery to remove the breast. This includes:  ? Total mastectomy. This removes the whole breast.  ? Nipple-sparing mastectomy. This removes the whole breast but leaves the nipple. ? Modified radical mastectomy. This removes the whole breast and the lymph nodes under the arm (axillary lymph nodes). ? Radical mastectomy. This removes the whole breast, the chest muscles, and all the lymph nodes under the arm. If lymph nodes under the arm are removed, they are looked at under the microscope to check for cancer. There are two types of lymph node removal:  · [place] lymph node biopsy removes the lymph node that is the first to receive lymph fluid from the tumor. If cancer has spread from the breast to the lymph nodes, cancer cells most often show up in the sentinel node first.  · Axillary lymph node dissection removes most of the lymph nodes in the armpit. The type of surgery you have depends on the size, location, and type of the cancer. It also depends on your overall health and personal preferences. Even if your doctor removes all the cancer that can be seen at the time of your surgery, you may still need more treatment. You may get radiation, chemotherapy, or hormone therapy after surgery to try to kill any cancer cells that may be left. No matter what kind of surgery you have, you will get information about why you are having it, what its risks are, how to prepare, and what to do after surgery. Follow-up care is a key part of your treatment and safety. Be sure to make and go to all appointments, and call your doctor if you are having problems.  It's also a good idea to know your test results and keep a list of the medicines you take. Where can you learn more? Go to http://jian-darline.info/. Enter X810 in the search box to learn more about \"Learning About Breast Cancer Treatments. \"  Current as of: December 19, 2018  Content Version: 12.2  © 9038-4453 [employer]. Care instructions adapted under license by [employer]) (which disclaims liability or warranty for this information). If you have questions about a medical condition or this instruction, always ask your healthcare professional. [address] any warranty or liability for your use of this information. none

## 2020-03-05 NOTE — PROGRESS NOTES
HISTORY OF PRESENT ILLNESS  Varsha Tabor is a 53 y.o. female. HPI ESTABLISHED patient here for port site check. She had this inserted in IR on 2/25/20. She started neoadjuvant chemotherapy on 2/28/20. No problems with port site. She is feeling okay today.      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 Maile Baumann. Discussed neoadjuvant chemotherapy ([address] P)  2/24/2020 - RIGHT breast MRI guided biopsy. Benign. 2/25/2020 - port insertion in IR  2/28/2020 - started neoadjuvant TCH-P - Dr. Maile Baumann     No family history of breast or ovarian cancer. The patient is genetic test negative [facility] Next 2/2020).    Imaging-  2/12/2020 - breast mri  2/7/2020 - bone scans and CT scans. Showed benign fibroid uterus, O/w clear  1/28/2020 - LEFT breast diagnostic mammogram and LEFT breast US    ROS    Physical Exam  Chest:           Visit Vitals  Ht 5' 6\" (1.676 m)   Wt 167 lb (75.8 kg)   BMI 26.95 kg/m²     ASSESSMENT and PLAN  Encounter Diagnoses   Name Primary?  Malignant neoplasm of left female breast, unspecified estrogen receptor status, unspecified site of breast ([facility] [address] Yes     Right chest wall - port in place and well healed. Was used last week for first round of chemo. Continues care with Dr. Maile Baumann and will follow-up with Dr. Soniya Boucher during her neoadjuvant treatment to discuss surgical management. She is comfortable with this plan. All questions answered and she stated understanding.

## 2020-03-07 LAB
APPEARANCE UR: ABNORMAL
BACTERIA #/AREA URNS HPF: ABNORMAL /[HPF]
BACTERIA UR CULT: NORMAL
BILIRUB UR QL STRIP: NEGATIVE
CASTS URNS QL MICRO: ABNORMAL /LPF
COLOR UR: YELLOW
EPI CELLS #/AREA URNS HPF: >10 /HPF (ref 0–10)
GLUCOSE UR QL: NEGATIVE
HGB UR QL STRIP: NEGATIVE
KETONES UR QL STRIP: NEGATIVE
LEUKOCYTE ESTERASE UR QL STRIP: ABNORMAL
MICRO URNS: ABNORMAL
MUCOUS THREADS URNS QL MICRO: PRESENT
NITRITE UR QL STRIP: NEGATIVE
PH UR STRIP: 6.5 [PH] (ref 5–7.5)
PROT UR QL STRIP: NEGATIVE
RBC #/AREA URNS HPF: ABNORMAL /HPF (ref 0–2)
SP GR UR: 1.01 (ref 1–1.03)
URINALYSIS REFLEX, 377202: ABNORMAL
UROBILINOGEN UR STRIP-MCNC: 0.2 MG/DL (ref 0.2–1)
WBC #/AREA URNS HPF: ABNORMAL /HPF (ref 0–5)

## 2020-03-13 RX ORDER — DIPHENHYDRAMINE HYDROCHLORIDE 50 MG/ML
25 INJECTION, SOLUTION INTRAMUSCULAR; INTRAVENOUS AS NEEDED
Status: CANCELLED
Start: 2020-03-20

## 2020-03-13 RX ORDER — SODIUM CHLORIDE 0.9 % (FLUSH) 0.9 %
10 SYRINGE (ML) INJECTION AS NEEDED
Status: CANCELLED
Start: 2020-05-01

## 2020-03-13 RX ORDER — SODIUM CHLORIDE 9 MG/ML
25 INJECTION, SOLUTION INTRAVENOUS CONTINUOUS
Status: CANCELLED | OUTPATIENT
Start: 2020-05-01 | End: 2020-05-01

## 2020-03-13 RX ORDER — SODIUM CHLORIDE 0.9 % (FLUSH) 0.9 %
10 SYRINGE (ML) INJECTION AS NEEDED
Status: CANCELLED
Start: 2020-04-10

## 2020-03-13 RX ORDER — ONDANSETRON 2 MG/ML
8 INJECTION INTRAMUSCULAR; INTRAVENOUS AS NEEDED
Status: CANCELLED | OUTPATIENT
Start: 2020-03-20

## 2020-03-13 RX ORDER — ALBUTEROL SULFATE 0.83 MG/ML
2.5 SOLUTION RESPIRATORY (INHALATION) AS NEEDED
Status: CANCELLED
Start: 2020-05-01

## 2020-03-13 RX ORDER — ALBUTEROL SULFATE 0.83 MG/ML
2.5 SOLUTION RESPIRATORY (INHALATION) AS NEEDED
Status: CANCELLED
Start: 2020-04-10

## 2020-03-13 RX ORDER — ACETAMINOPHEN 325 MG/1
650 TABLET ORAL AS NEEDED
Status: CANCELLED
Start: 2020-03-20

## 2020-03-13 RX ORDER — PALONOSETRON 0.05 MG/ML
0.25 INJECTION, SOLUTION INTRAVENOUS ONCE
Status: CANCELLED | OUTPATIENT
Start: 2020-04-10

## 2020-03-13 RX ORDER — ACETAMINOPHEN 325 MG/1
650 TABLET ORAL AS NEEDED
Status: CANCELLED
Start: 2020-05-01

## 2020-03-13 RX ORDER — DEXAMETHASONE SODIUM PHOSPHATE 100 MG/10ML
10 INJECTION INTRAMUSCULAR; INTRAVENOUS ONCE
Status: CANCELLED | OUTPATIENT
Start: 2020-05-01

## 2020-03-13 RX ORDER — DIPHENHYDRAMINE HYDROCHLORIDE 50 MG/ML
50 INJECTION, SOLUTION INTRAMUSCULAR; INTRAVENOUS
Status: CANCELLED | OUTPATIENT
Start: 2020-05-01

## 2020-03-13 RX ORDER — DIPHENHYDRAMINE HYDROCHLORIDE 50 MG/ML
50 INJECTION, SOLUTION INTRAMUSCULAR; INTRAVENOUS AS NEEDED
Status: CANCELLED
Start: 2020-03-20

## 2020-03-13 RX ORDER — DIPHENHYDRAMINE HYDROCHLORIDE 50 MG/ML
50 INJECTION, SOLUTION INTRAMUSCULAR; INTRAVENOUS
Status: CANCELLED | OUTPATIENT
Start: 2020-03-20

## 2020-03-13 RX ORDER — SODIUM CHLORIDE 9 MG/ML
10 INJECTION INTRAMUSCULAR; INTRAVENOUS; SUBCUTANEOUS AS NEEDED
Status: CANCELLED | OUTPATIENT
Start: 2020-03-20

## 2020-03-13 RX ORDER — DIPHENHYDRAMINE HYDROCHLORIDE 50 MG/ML
50 INJECTION, SOLUTION INTRAMUSCULAR; INTRAVENOUS
Status: CANCELLED | OUTPATIENT
Start: 2020-04-10

## 2020-03-13 RX ORDER — ACETAMINOPHEN 325 MG/1
650 TABLET ORAL
Status: CANCELLED | OUTPATIENT
Start: 2020-03-20

## 2020-03-13 RX ORDER — HYDROCORTISONE SODIUM SUCCINATE 100 MG/2ML
100 INJECTION, POWDER, FOR SOLUTION INTRAMUSCULAR; INTRAVENOUS AS NEEDED
Status: CANCELLED | OUTPATIENT
Start: 2020-03-20

## 2020-03-13 RX ORDER — EPINEPHRINE 1 MG/ML
0.3 INJECTION, SOLUTION, CONCENTRATE INTRAVENOUS AS NEEDED
Status: CANCELLED | OUTPATIENT
Start: 2020-03-20

## 2020-03-13 RX ORDER — SODIUM CHLORIDE 9 MG/ML
10 INJECTION INTRAMUSCULAR; INTRAVENOUS; SUBCUTANEOUS AS NEEDED
Status: CANCELLED | OUTPATIENT
Start: 2020-04-10

## 2020-03-13 RX ORDER — HEPARIN 100 UNIT/ML
300-500 SYRINGE INTRAVENOUS AS NEEDED
Status: CANCELLED
Start: 2020-05-01

## 2020-03-13 RX ORDER — ACETAMINOPHEN 325 MG/1
650 TABLET ORAL
Status: CANCELLED | OUTPATIENT
Start: 2020-04-10

## 2020-03-13 RX ORDER — EPINEPHRINE 1 MG/ML
0.3 INJECTION, SOLUTION, CONCENTRATE INTRAVENOUS AS NEEDED
Status: CANCELLED | OUTPATIENT
Start: 2020-05-01

## 2020-03-13 RX ORDER — DIPHENHYDRAMINE HYDROCHLORIDE 50 MG/ML
25 INJECTION, SOLUTION INTRAMUSCULAR; INTRAVENOUS AS NEEDED
Status: CANCELLED
Start: 2020-05-01

## 2020-03-13 RX ORDER — PALONOSETRON 0.05 MG/ML
0.25 INJECTION, SOLUTION INTRAVENOUS ONCE
Status: CANCELLED | OUTPATIENT
Start: 2020-03-20

## 2020-03-13 RX ORDER — DEXAMETHASONE SODIUM PHOSPHATE 100 MG/10ML
10 INJECTION INTRAMUSCULAR; INTRAVENOUS ONCE
Status: CANCELLED | OUTPATIENT
Start: 2020-03-20

## 2020-03-13 RX ORDER — SODIUM CHLORIDE 9 MG/ML
25 INJECTION, SOLUTION INTRAVENOUS CONTINUOUS
Status: CANCELLED | OUTPATIENT
Start: 2020-03-20 | End: 2020-03-20

## 2020-03-13 RX ORDER — ACETAMINOPHEN 325 MG/1
650 TABLET ORAL
Status: CANCELLED | OUTPATIENT
Start: 2020-05-01

## 2020-03-13 RX ORDER — ONDANSETRON 2 MG/ML
8 INJECTION INTRAMUSCULAR; INTRAVENOUS AS NEEDED
Status: CANCELLED | OUTPATIENT
Start: 2020-05-01

## 2020-03-13 RX ORDER — DIPHENHYDRAMINE HYDROCHLORIDE 50 MG/ML
50 INJECTION, SOLUTION INTRAMUSCULAR; INTRAVENOUS AS NEEDED
Status: CANCELLED
Start: 2020-04-10

## 2020-03-13 RX ORDER — ACETAMINOPHEN 325 MG/1
650 TABLET ORAL AS NEEDED
Status: CANCELLED
Start: 2020-04-10

## 2020-03-13 RX ORDER — ALBUTEROL SULFATE 0.83 MG/ML
2.5 SOLUTION RESPIRATORY (INHALATION) AS NEEDED
Status: CANCELLED
Start: 2020-03-20

## 2020-03-13 RX ORDER — DEXAMETHASONE SODIUM PHOSPHATE 100 MG/10ML
10 INJECTION INTRAMUSCULAR; INTRAVENOUS ONCE
Status: CANCELLED | OUTPATIENT
Start: 2020-04-10

## 2020-03-13 RX ORDER — ONDANSETRON 2 MG/ML
8 INJECTION INTRAMUSCULAR; INTRAVENOUS AS NEEDED
Status: CANCELLED | OUTPATIENT
Start: 2020-04-10

## 2020-03-13 RX ORDER — HEPARIN 100 UNIT/ML
300-500 SYRINGE INTRAVENOUS AS NEEDED
Status: CANCELLED
Start: 2020-03-20

## 2020-03-13 RX ORDER — HYDROCORTISONE SODIUM SUCCINATE 100 MG/2ML
100 INJECTION, POWDER, FOR SOLUTION INTRAMUSCULAR; INTRAVENOUS AS NEEDED
Status: CANCELLED | OUTPATIENT
Start: 2020-04-10

## 2020-03-13 RX ORDER — SODIUM CHLORIDE 0.9 % (FLUSH) 0.9 %
10 SYRINGE (ML) INJECTION AS NEEDED
Status: CANCELLED
Start: 2020-03-20

## 2020-03-13 RX ORDER — DIPHENHYDRAMINE HYDROCHLORIDE 50 MG/ML
25 INJECTION, SOLUTION INTRAMUSCULAR; INTRAVENOUS AS NEEDED
Status: CANCELLED
Start: 2020-04-10

## 2020-03-13 RX ORDER — HYDROCORTISONE SODIUM SUCCINATE 100 MG/2ML
100 INJECTION, POWDER, FOR SOLUTION INTRAMUSCULAR; INTRAVENOUS AS NEEDED
Status: CANCELLED | OUTPATIENT
Start: 2020-05-01

## 2020-03-13 RX ORDER — HEPARIN 100 UNIT/ML
300-500 SYRINGE INTRAVENOUS AS NEEDED
Status: CANCELLED
Start: 2020-04-10

## 2020-03-13 RX ORDER — DIPHENHYDRAMINE HYDROCHLORIDE 50 MG/ML
50 INJECTION, SOLUTION INTRAMUSCULAR; INTRAVENOUS AS NEEDED
Status: CANCELLED
Start: 2020-05-01

## 2020-03-13 RX ORDER — PALONOSETRON 0.05 MG/ML
0.25 INJECTION, SOLUTION INTRAVENOUS ONCE
Status: CANCELLED | OUTPATIENT
Start: 2020-05-01

## 2020-03-13 RX ORDER — EPINEPHRINE 1 MG/ML
0.3 INJECTION, SOLUTION, CONCENTRATE INTRAVENOUS AS NEEDED
Status: CANCELLED | OUTPATIENT
Start: 2020-04-10

## 2020-03-13 RX ORDER — SODIUM CHLORIDE 9 MG/ML
10 INJECTION INTRAMUSCULAR; INTRAVENOUS; SUBCUTANEOUS AS NEEDED
Status: CANCELLED | OUTPATIENT
Start: 2020-05-01

## 2020-03-13 RX ORDER — SODIUM CHLORIDE 9 MG/ML
25 INJECTION, SOLUTION INTRAVENOUS CONTINUOUS
Status: CANCELLED | OUTPATIENT
Start: 2020-04-10 | End: 2020-04-10

## 2020-03-20 ENCOUNTER — HOSPITAL ENCOUNTER (OUTPATIENT)
Dept: INFUSION THERAPY | Age: 45
Discharge: HOME | End: 2020-03-20
Payer: COMMERCIAL

## 2020-03-20 ENCOUNTER — OFFICE VISIT (OUTPATIENT)
Dept: ONCOLOGY | Age: 45
End: 2020-03-20

## 2020-03-20 VITALS
WEIGHT: 163 LBS | HEART RATE: 81 BPM | BODY MASS INDEX: 26.2 KG/M2 | SYSTOLIC BLOOD PRESSURE: 104 MMHG | DIASTOLIC BLOOD PRESSURE: 63 MMHG | TEMPERATURE: 99 F | HEIGHT: 66 IN

## 2020-03-20 VITALS
BODY MASS INDEX: 26.26 KG/M2 | HEIGHT: 66 IN | DIASTOLIC BLOOD PRESSURE: 72 MMHG | TEMPERATURE: 97.5 F | RESPIRATION RATE: 16 BRPM | WEIGHT: 163.4 LBS | SYSTOLIC BLOOD PRESSURE: 125 MMHG | HEART RATE: 82 BPM

## 2020-03-20 DIAGNOSIS — C50.912 HER2-POSITIVE CARCINOMA OF LEFT BREAST (HCC): ICD-10-CM

## 2020-03-20 DIAGNOSIS — C50.912 MALIGNANT NEOPLASM OF LEFT BREAST IN FEMALE, ESTROGEN RECEPTOR NEGATIVE, UNSPECIFIED SITE OF BREAST (HCC): Primary | ICD-10-CM

## 2020-03-20 DIAGNOSIS — Z09 CHEMOTHERAPY FOLLOW-UP EXAMINATION: ICD-10-CM

## 2020-03-20 DIAGNOSIS — Z17.1 MALIGNANT NEOPLASM OF UPPER-OUTER QUADRANT OF LEFT BREAST IN FEMALE, ESTROGEN RECEPTOR NEGATIVE (HCC): Primary | ICD-10-CM

## 2020-03-20 DIAGNOSIS — R11.2 CINV (CHEMOTHERAPY-INDUCED NAUSEA AND VOMITING): ICD-10-CM

## 2020-03-20 DIAGNOSIS — R19.7 DIARRHEA, UNSPECIFIED TYPE: ICD-10-CM

## 2020-03-20 DIAGNOSIS — T45.1X5A CINV (CHEMOTHERAPY-INDUCED NAUSEA AND VOMITING): ICD-10-CM

## 2020-03-20 DIAGNOSIS — Z17.1 MALIGNANT NEOPLASM OF LEFT BREAST IN FEMALE, ESTROGEN RECEPTOR NEGATIVE, UNSPECIFIED SITE OF BREAST (HCC): Primary | ICD-10-CM

## 2020-03-20 DIAGNOSIS — C50.412 MALIGNANT NEOPLASM OF UPPER-OUTER QUADRANT OF LEFT BREAST IN FEMALE, ESTROGEN RECEPTOR NEGATIVE (HCC): Primary | ICD-10-CM

## 2020-03-20 LAB
ALBUMIN SERPL-MCNC: 3.2 G/DL (ref 3.5–5)
ALBUMIN/GLOB SERPL: 0.9 {RATIO} (ref 1.1–2.2)
ALP SERPL-CCNC: 99 U/L (ref 45–117)
ALT SERPL-CCNC: 36 U/L (ref 12–78)
ANION GAP SERPL CALC-SCNC: 5 MMOL/L (ref 5–15)
AST SERPL-CCNC: 19 U/L (ref 15–37)
BASOPHILS # BLD: 0.1 K/UL (ref 0–0.1)
BASOPHILS NFR BLD: 1 % (ref 0–1)
BILIRUB SERPL-MCNC: 0.4 MG/DL (ref 0.2–1)
BUN SERPL-MCNC: 21 MG/DL (ref 6–20)
BUN/CREAT SERPL: 20 (ref 12–20)
CALCIUM SERPL-MCNC: 8.6 MG/DL (ref 8.5–10.1)
CHLORIDE SERPL-SCNC: 110 MMOL/L (ref 97–108)
CO2 SERPL-SCNC: 24 MMOL/L (ref 21–32)
CREAT SERPL-MCNC: 1.07 MG/DL (ref 0.55–1.02)
DIFFERENTIAL METHOD BLD: ABNORMAL
EOSINOPHIL # BLD: 0.1 K/UL (ref 0–0.4)
EOSINOPHIL NFR BLD: 1 % (ref 0–7)
ERYTHROCYTE [DISTWIDTH] IN BLOOD BY AUTOMATED COUNT: 13.2 % (ref 11.5–14.5)
GLOBULIN SER CALC-MCNC: 3.7 G/DL (ref 2–4)
GLUCOSE SERPL-MCNC: 100 MG/DL (ref 65–100)
HCT VFR BLD AUTO: 32.8 % (ref 35–47)
HGB BLD-MCNC: 11.2 G/DL (ref 11.5–16)
IMM GRANULOCYTES # BLD AUTO: 0.1 K/UL (ref 0–0.04)
IMM GRANULOCYTES NFR BLD AUTO: 1 % (ref 0–0.5)
LYMPHOCYTES # BLD: 1.3 K/UL (ref 0.8–3.5)
LYMPHOCYTES NFR BLD: 20 % (ref 12–49)
MCH RBC QN AUTO: 30.3 PG (ref 26–34)
MCHC RBC AUTO-ENTMCNC: 34.1 G/DL (ref 30–36.5)
MCV RBC AUTO: 88.6 FL (ref 80–99)
MONOCYTES # BLD: 0.5 K/UL (ref 0–1)
MONOCYTES NFR BLD: 7 % (ref 5–13)
NEUTS SEG # BLD: 4.3 K/UL (ref 1.8–8)
NEUTS SEG NFR BLD: 70 % (ref 32–75)
NRBC # BLD: 0 K/UL (ref 0–0.01)
NRBC BLD-RTO: 0 PER 100 WBC
PLATELET # BLD AUTO: 332 K/UL (ref 150–400)
PMV BLD AUTO: 10 FL (ref 8.9–12.9)
POTASSIUM SERPL-SCNC: 3.9 MMOL/L (ref 3.5–5.1)
PROT SERPL-MCNC: 6.9 G/DL (ref 6.4–8.2)
RBC # BLD AUTO: 3.7 M/UL (ref 3.8–5.2)
SODIUM SERPL-SCNC: 139 MMOL/L (ref 136–145)
WBC # BLD AUTO: 6.2 K/UL (ref 3.6–11)

## 2020-03-20 PROCEDURE — 96367 TX/PROPH/DG ADDL SEQ IV INF: CPT

## 2020-03-20 PROCEDURE — 96417 CHEMO IV INFUS EACH ADDL SEQ: CPT

## 2020-03-20 PROCEDURE — 96377 APPLICATON ON-BODY INJECTOR: CPT

## 2020-03-20 PROCEDURE — 74011250636 HC RX REV CODE- 250/636: Performed by: INTERNAL MEDICINE

## 2020-03-20 PROCEDURE — 96413 CHEMO IV INFUSION 1 HR: CPT

## 2020-03-20 PROCEDURE — 36415 COLL VENOUS BLD VENIPUNCTURE: CPT

## 2020-03-20 PROCEDURE — 77030012965 HC NDL HUBR BBMI -A

## 2020-03-20 PROCEDURE — 85025 COMPLETE CBC W/AUTO DIFF WBC: CPT

## 2020-03-20 PROCEDURE — 74011000250 HC RX REV CODE- 250: Performed by: INTERNAL MEDICINE

## 2020-03-20 PROCEDURE — 74011000258 HC RX REV CODE- 258: Performed by: INTERNAL MEDICINE

## 2020-03-20 PROCEDURE — 80053 COMPREHEN METABOLIC PANEL: CPT

## 2020-03-20 PROCEDURE — 96375 TX/PRO/DX INJ NEW DRUG ADDON: CPT

## 2020-03-20 RX ORDER — SODIUM CHLORIDE 0.9 % (FLUSH) 0.9 %
10 SYRINGE (ML) INJECTION AS NEEDED
Status: DISPENSED | OUTPATIENT
Start: 2020-03-20 | End: 2020-03-20

## 2020-03-20 RX ORDER — PALONOSETRON 0.05 MG/ML
0.25 INJECTION, SOLUTION INTRAVENOUS ONCE
Status: COMPLETED | OUTPATIENT
Start: 2020-03-20 | End: 2020-03-20

## 2020-03-20 RX ORDER — SODIUM CHLORIDE 9 MG/ML
10 INJECTION INTRAMUSCULAR; INTRAVENOUS; SUBCUTANEOUS AS NEEDED
Status: ACTIVE | OUTPATIENT
Start: 2020-03-20 | End: 2020-03-20

## 2020-03-20 RX ORDER — HEPARIN 100 UNIT/ML
300-500 SYRINGE INTRAVENOUS AS NEEDED
Status: ACTIVE | OUTPATIENT
Start: 2020-03-20 | End: 2020-03-20

## 2020-03-20 RX ORDER — SODIUM CHLORIDE 9 MG/ML
25 INJECTION, SOLUTION INTRAVENOUS CONTINUOUS
Status: DISPENSED | OUTPATIENT
Start: 2020-03-20 | End: 2020-03-20

## 2020-03-20 RX ORDER — DEXAMETHASONE SODIUM PHOSPHATE 4 MG/ML
10 INJECTION, SOLUTION INTRA-ARTICULAR; INTRALESIONAL; INTRAMUSCULAR; INTRAVENOUS; SOFT TISSUE ONCE
Status: COMPLETED | OUTPATIENT
Start: 2020-03-20 | End: 2020-03-20

## 2020-03-20 RX ADMIN — PEGFILGRASTIM 6 MG: KIT SUBCUTANEOUS at 16:13

## 2020-03-20 RX ADMIN — Medication 10 ML: at 09:20

## 2020-03-20 RX ADMIN — SODIUM CHLORIDE 142 MG: 900 INJECTION, SOLUTION INTRAVENOUS at 13:55

## 2020-03-20 RX ADMIN — DEXAMETHASONE SODIUM PHOSPHATE 10 MG: 4 INJECTION, SOLUTION INTRAMUSCULAR; INTRAVENOUS at 13:37

## 2020-03-20 RX ADMIN — PALONOSETRON 0.25 MG: 0.05 INJECTION, SOLUTION INTRAVENOUS at 13:43

## 2020-03-20 RX ADMIN — SODIUM CHLORIDE 150 MG: 900 INJECTION, SOLUTION INTRAVENOUS at 13:05

## 2020-03-20 RX ADMIN — SODIUM CHLORIDE 10 ML: 9 INJECTION, SOLUTION INTRAMUSCULAR; INTRAVENOUS; SUBCUTANEOUS at 09:20

## 2020-03-20 RX ADMIN — PERTUZUMAB 420 MG: 30 INJECTION, SOLUTION, CONCENTRATE INTRAVENOUS at 12:20

## 2020-03-20 RX ADMIN — SODIUM CHLORIDE 25 ML/HR: 900 INJECTION, SOLUTION INTRAVENOUS at 10:29

## 2020-03-20 RX ADMIN — Medication 10 ML: at 16:10

## 2020-03-20 RX ADMIN — CARBOPLATIN 700 MG: 10 INJECTION, SOLUTION INTRAVENOUS at 15:03

## 2020-03-20 RX ADMIN — Medication 500 UNITS: at 16:11

## 2020-03-20 RX ADMIN — TRASTUZUMAB 450 MG: 150 INJECTION, POWDER, LYOPHILIZED, FOR SOLUTION INTRAVENOUS at 11:30

## 2020-03-20 NOTE — PROGRESS NOTES
905- Pt arrived to [address] [place] [address]. ambulatory in no acute distress for C2D1 TCHP.  Assessment unremarkable. R chest port accessed without issue and positive blood return noted. Confirmed with patient that she took home dose of Decadron 8 mg PO. Labs obtained - CBC w/ diff, CMP  Recent Results (from the past 12 hour(s))   CBC WITH AUTOMATED DIFF    Collection Time: 03/20/20  9:24 AM   Result Value Ref Range    WBC 6.2 3.6 - 11.0 K/uL    RBC 3.70 (L) 3.80 - 5.20 M/uL    HGB 11.2 (L) 11.5 - 16.0 g/dL    HCT 32.8 (L) 35.0 - 47.0 %    MCV 88.6 80.0 - 99.0 FL    MCH 30.3 26.0 - 34.0 PG    MCHC 34.1 30.0 - 36.5 g/dL    RDW 13.2 11.5 - 14.5 %    PLATELET 806 460 - 643 K/uL    MPV 10.0 8.9 - 12.9 FL    NRBC 0.0 0  WBC    ABSOLUTE NRBC 0.00 0.00 - 0.01 K/uL    NEUTROPHILS 70 32 - 75 %    LYMPHOCYTES 20 12 - 49 %    MONOCYTES 7 5 - 13 %    EOSINOPHILS 1 0 - 7 %    BASOPHILS 1 0 - 1 %    IMMATURE GRANULOCYTES 1 (H) 0.0 - 0.5 %    ABS. NEUTROPHILS 4.3 1.8 - 8.0 K/UL    ABS. LYMPHOCYTES 1.3 0.8 - 3.5 K/UL    ABS. MONOCYTES 0.5 0.0 - 1.0 K/UL    ABS. EOSINOPHILS 0.1 0.0 - 0.4 K/UL    ABS. BASOPHILS 0.1 0.0 - 0.1 K/UL    ABS. IMM. GRANS. 0.1 (H) 0.00 - 0.04 K/UL    DF AUTOMATED     METABOLIC PANEL, COMPREHENSIVE    Collection Time: 03/20/20  9:24 AM   Result Value Ref Range    Sodium 139 136 - 145 mmol/L    Potassium 3.9 3.5 - 5.1 mmol/L    Chloride 110 (H) 97 - 108 mmol/L    CO2 24 21 - 32 mmol/L    Anion gap 5 5 - 15 mmol/L    Glucose 100 65 - 100 mg/dL    BUN 21 (H) 6 - 20 MG/DL    Creatinine 1.07 (H) 0.55 - 1.02 MG/DL    BUN/Creatinine ratio 20 12 - 20      GFR est AA >60 >60 ml/min/1.73m2    GFR est non-AA 55 (L) >60 ml/min/1.73m2    Calcium 8.6 8.5 - 10.1 MG/DL    Bilirubin, total 0.4 0.2 - 1.0 MG/DL    ALT (SGPT) 36 12 - 78 U/L    AST (SGOT) 19 15 - 37 U/L    Alk.  phosphatase 99 45 - 117 U/L    Protein, total 6.9 6.4 - 8.2 g/dL    Albumin 3.2 (L) 3.5 - 5.0 g/dL    Globulin 3.7 2.0 - 4.0 g/dL    A-G Ratio 0.9 (L) 1.1 - 2.2       The following medications administered:  NS @ KVO  Herceptin 450 mg IV  Perjeta 420 mg IV  Emend 150 mg IVPB  Decadron 10 mg IVP  Aloxi 0.25 mg IV  Taxotere 142 mg IV  Carboplatin 700 mg IV  Neulasta On Body Injector 6 mg SQ to left arm    Patient Vitals for the past 12 hrs:   Temp Pulse Resp BP   03/20/20 1606 97.5 °F (36.4 °C) 82 16 125/72   03/20/20 0905 99 °F (37.2 °C) 81 16 104/63     1620- Pt tolerated treatment well, no adverse reactions noted.  Port flushed per policy and de-accessed, 2x2 and tape placed.  Pt discharged ambulatory in no acute distress, accompanied by . Next appointment 4/10/20. Education provided to patient about Neulasta On Body Injector including: side effects, how/when to remove the device, as well as what to do in the event of device malfunction. Opportunity for questions was provided and all questions were answered. Patient verbalized understanding.

## 2020-03-20 NOTE — PROGRESS NOTES
[address], [address] [place] [address] Derek Samuel, [address] [facility]  [phone]      Oncology Progress note      Patient: Vinnie See MRN: [id number]  SSN: [id number]    YOB: 1975  Age: 53 y.o. Sex: female        Diagnosis:     1. Left breast carcinoma: Dx: 1/31/2020  T2 N1 M0 (Stage IIA) Invasive ductal carcinoma, Tumor size 2 cm, grade 3, ER 0%, SC 0%, Her 2 3+     Treatment:     1. Neoadjuvant chemotherapy   Docetaxel, Carboplatin, Trastuzumab + Pertuzumab   Cycle 2 day 1    Subjective:      Vinnie See is a 53 y.o. female who I am seeing in follow up for a new diagnosis of left sided invasive breast carcinoma. She felt a lump in her left breast. Dr. Maicol Cuellar obtained a diagnostic mammogram. The mammogram showed an abnormality in the upper quadrant of the left breast. A biopsy of the lesion revealed a ER 0% SC 0% and Her 2 3+ Gr 3 IDC of the left breast.     Ms. Virginia Nye returns today for her second cycle of neoadjuvant treatment. She said she experienced intermittent diarrhea throughout the last 3 weeks, but was able to control it with medication. She is accompanied by her .      Review of Systems:    Constitutional: negative  Eyes: negative  Ears, Nose, Mouth, Throat, and Face: negative  Respiratory: negative  Cardiovascular: negative  Gastrointestinal: negative  Genitourinary:negative  Integument/Breast: negative  Hematologic/Lymphatic: negative  Musculoskeletal:negative  Neurological: negative    Past Medical History:   Diagnosis Date    Anxiety and depression     Cancer ([address] 01/2020    left breast ca er/pr (-) her 2 (+)     Past Surgical History:   Procedure Laterality Date    HX BREAST BIOPSY Left 07/24/2018    Left sebaceous cyst surgically removed    HX CYST INCISION AND DRAINAGE Left 20+ yrs ago    HX HEENT      tonsillectomy    IR INSERT TUNL CVC W PORT OVER 5 YEARS  2/25/2020      Family History   Problem Relation Age of Onset    Hypertension Mother     Kidney Disease Mother     Thyroid Disease Mother      Social History     Tobacco Use    Smoking status: Never Smoker    Smokeless tobacco: Never Used   Substance Use Topics    Alcohol use: No      Prior to Admission medications    Medication Sig Start Date End Date Taking? Authorizing Provider   ondansetron (ZOFRAN ODT) 4 mg disintegrating tablet Take 1 Tab by mouth every eight (8) hours as needed for Nausea or Vomiting. 2/11/20   Abiel Schneider NP   prochlorperazine (COMPAZINE) 10 mg tablet Take 0.5 Tabs by mouth every six (6) hours as needed for Nausea for up to 30 doses. 2/11/20   Abiel Schneider NP   diphenoxylate-atropine (LOMOTIL) 2.5-0.025 mg per tablet Take 1 Tab by mouth four (4) times daily as needed for Diarrhea. Max Daily Amount: 4 Tabs. 2/11/20   Abiel Schneider NP   lidocaine-prilocaine (EMLA) topical cream Apply  to affected area as needed for Pain. 2/11/20   Pete Marquez NP   dexAMETHasone (DECADRON) 4 mg tablet Take 8 mg by mouth two (2) times daily (with meals). The day of chemotherapy and the day after chemotherapy. 2/11/20   Abiel Schneider NP   ethinyl estradiol-etonogestrel (NUVARING) 0.12-0.015 mg/24 hr vaginal ring by Intravaginal route. Provider, Historical   VIIBRYD 40 mg tab tablet TAKE 1 TABLET BY MOUTH EVERY DAY 6/19/18   Provider, Historical   ALPRAZolam (XANAX) 0.5 mg tablet 0.5 mg as needed. 1/21/16   Provider, Historical          No Known Allergies      Objective:     Vitals:    03/20/20 1039   BP: 104/63   Pulse: 81   Temp: 99 °F (37.2 °C)   Weight: 163 lb (73.9 kg)   Height: 5' 6\" (1.676 m)      Pain Scale: 0/10      Physical Exam:    GENERAL: alert, cooperative, no distress, appears stated age  EYE: conjunctivae/corneas clear. PERRL, EOM's intact  LYMPHATIC: Cervical, supraclavicular, and axillary nodes normal.   THROAT & NECK: normal and no erythema or exudates noted.    LUNG: clear to auscultation bilaterally  HEART: regular rate and rhythm, S1, S2 normal, no murmur, click, rub or gallop  ABDOMEN: soft, non-tender. Bowel sounds normal. No masses,  no organomegaly  EXTREMITIES:  extremities normal, atraumatic, no cyanosis or edema  SKIN: Normal.  NEUROLOGIC: AOx3. Gait normal. Reflexes and motor strength normal and symmetric. Cranial nerves 2-12 and sensation grossly intact. Lab Results   Component Value Date/Time    WBC 6.2 03/20/2020 09:24 AM    HGB 11.2 (L) 03/20/2020 09:24 AM    HCT 32.8 (L) 03/20/2020 09:24 AM    PLATELET 225 54/68/1622 09:24 AM    MCV 88.6 03/20/2020 09:24 AM       Lab Results   Component Value Date/Time    Sodium 139 03/20/2020 09:24 AM    Potassium 3.9 03/20/2020 09:24 AM    Chloride 110 (H) 03/20/2020 09:24 AM    CO2 24 03/20/2020 09:24 AM    Anion gap 5 03/20/2020 09:24 AM    Glucose 100 03/20/2020 09:24 AM    BUN 21 (H) 03/20/2020 09:24 AM    Creatinine 1.07 (H) 03/20/2020 09:24 AM    BUN/Creatinine ratio 20 03/20/2020 09:24 AM    GFR est AA >60 03/20/2020 09:24 AM    GFR est non-AA 55 (L) 03/20/2020 09:24 AM    Calcium 8.6 03/20/2020 09:24 AM    Bilirubin, total 0.4 03/20/2020 09:24 AM    AST (SGOT) 19 03/20/2020 09:24 AM    Alk. phosphatase 99 03/20/2020 09:24 AM    Protein, total 6.9 03/20/2020 09:24 AM    Albumin 3.2 (L) 03/20/2020 09:24 AM    Globulin 3.7 03/20/2020 09:24 AM    A-G Ratio 0.9 (L) 03/20/2020 09:24 AM    ALT (SGPT) 36 03/20/2020 09:24 AM           Assessment:     1. Left breast carcinoma:  T2 N1 M0 (Stage IIA) Invasive ductal carcinoma, Tumor size 2 cm, grade 3, ER 0%, CA 0%, Her 2 3+        ECOG PS 0  Intent of Treatment - curative  Prognosis: excellent    The most effective combination for neoadjuvant treatment for Her 2 +ve locally advanced breast cancer is dual Her blockade with Pertuzumab/Herceptin in combination with Taxanes. There are two trials which have displayed a high response rate with dual Her blockade.      Receiving neoadjuvant chemotherapy   Docetaxel, Carboplatin, Trastuzumab + Pertuzumab   Cycle 2 day 1    Tolerating treatment   A detailed system by system evaluation of side effect was performed to assess chemotherapy related toxicity. Blood counts are acceptable. Results reviewed with the patient. 2. Anemia from chemotherapy    Observation      3. CINV    Antiemetics        Plan:       · Proceed with C#2 TCH-P (Trastuzumab 8mg/kg load with cycle 1 then 6mg/kg, Docetaxel 75mg/m2, Carboplatin AUC 6, Pertuzumab 840mg load with cycle 1 then 420mg) given every 3 weeks x 6 cycles  · Labs: CBC, CMP prior to each treatment. Troponin, proBNP prior to treatment and every 12 weeks  · Antiemetic Prophylaxis: Palonosetron and dexamethasone prior to chemo  · PRN Antiemetics: Ondansetron, Compazine  · Swelling prophylaxis: Dexmethasone 8mg bid the day before, day of and day after chemotherapy  · TTE prior to chemotherapy and every 12 weeks while on Trastuzumab  · Neulasta 24-72 hours after each treatment  · Rx for cranial prosthesis given to patient  · Return to clinic 3 weeks      I saw the patient in conjunction with Burnard Pallas, FNP. Signed by: Gordon Trinh NP                     March 20, 2020      CC. Cory Leon MD  CC.  Jacob Arias MD

## 2020-03-20 NOTE — PROGRESS NOTES
Claudio Zurita is a 53 y.o. female   Chief Complaint   Patient presents with    Breast Cancer     (L)    Chemotherapy   Cycle 2 Day 1

## 2020-03-20 NOTE — LETTER
3/20/20 Patient: Peyman Tam YOB: 1975 Date of Visit: 3/20/2020 Rachel Bobo MD 
[address] [facility] [address] [address] VIA Facsimile: [phone] Dear Rachel Bobo MD, Thank you for referring Ms. Raheel Pena to [address] [place] [address] for evaluation. My notes for this consultation are attached. If you have questions, please do not hesitate to call me. I look forward to following your patient along with you.  
 
 
Sincerely, 
 
Claudia Dubon NP

## 2020-03-27 ENCOUNTER — TELEPHONE (OUTPATIENT)
Dept: ONCOLOGY | Age: 45
End: 2020-03-27

## 2020-03-27 NOTE — TELEPHONE ENCOUNTER
Diarrhea occurred Saturday then again since Tuesday, she is tired of taking the imodium/lomotil. She is trying to increase her fluid intake. She does not feel dehydrated right now. She as provided dispatch health's number in case she feels poorly over the weekend. ELIAZAR Marquez advised pt to take lomotil 2 tabs every 4 hours if diarrhea continues. She knows not to take this if no diarrhea.

## 2020-04-10 ENCOUNTER — OFFICE VISIT (OUTPATIENT)
Dept: ONCOLOGY | Age: 45
End: 2020-04-10

## 2020-04-10 ENCOUNTER — HOSPITAL ENCOUNTER (OUTPATIENT)
Dept: INFUSION THERAPY | Age: 45
Discharge: HOME | End: 2020-04-10
Payer: COMMERCIAL

## 2020-04-10 VITALS
BODY MASS INDEX: 27.31 KG/M2 | HEART RATE: 77 BPM | DIASTOLIC BLOOD PRESSURE: 64 MMHG | HEIGHT: 66 IN | WEIGHT: 169.9 LBS | SYSTOLIC BLOOD PRESSURE: 108 MMHG | TEMPERATURE: 98.4 F | OXYGEN SATURATION: 97 %

## 2020-04-10 VITALS
TEMPERATURE: 98.4 F | HEIGHT: 66 IN | DIASTOLIC BLOOD PRESSURE: 65 MMHG | WEIGHT: 169 LBS | HEART RATE: 89 BPM | BODY MASS INDEX: 27.16 KG/M2 | SYSTOLIC BLOOD PRESSURE: 118 MMHG | OXYGEN SATURATION: 97 %

## 2020-04-10 DIAGNOSIS — C50.912 MALIGNANT NEOPLASM OF LEFT BREAST IN FEMALE, ESTROGEN RECEPTOR NEGATIVE, UNSPECIFIED SITE OF BREAST (HCC): Primary | ICD-10-CM

## 2020-04-10 DIAGNOSIS — R11.2 CINV (CHEMOTHERAPY-INDUCED NAUSEA AND VOMITING): ICD-10-CM

## 2020-04-10 DIAGNOSIS — D64.81 ANTINEOPLASTIC CHEMOTHERAPY INDUCED ANEMIA: ICD-10-CM

## 2020-04-10 DIAGNOSIS — Z09 CHEMOTHERAPY FOLLOW-UP EXAMINATION: ICD-10-CM

## 2020-04-10 DIAGNOSIS — R19.7 DIARRHEA, UNSPECIFIED TYPE: ICD-10-CM

## 2020-04-10 DIAGNOSIS — T45.1X5A CINV (CHEMOTHERAPY-INDUCED NAUSEA AND VOMITING): ICD-10-CM

## 2020-04-10 DIAGNOSIS — C50.412 MALIGNANT NEOPLASM OF UPPER-OUTER QUADRANT OF LEFT BREAST IN FEMALE, ESTROGEN RECEPTOR NEGATIVE (HCC): Primary | ICD-10-CM

## 2020-04-10 DIAGNOSIS — T45.1X5A ANTINEOPLASTIC CHEMOTHERAPY INDUCED ANEMIA: ICD-10-CM

## 2020-04-10 DIAGNOSIS — Z17.1 MALIGNANT NEOPLASM OF LEFT BREAST IN FEMALE, ESTROGEN RECEPTOR NEGATIVE, UNSPECIFIED SITE OF BREAST (HCC): Primary | ICD-10-CM

## 2020-04-10 DIAGNOSIS — Z17.1 MALIGNANT NEOPLASM OF UPPER-OUTER QUADRANT OF LEFT BREAST IN FEMALE, ESTROGEN RECEPTOR NEGATIVE (HCC): Primary | ICD-10-CM

## 2020-04-10 DIAGNOSIS — C50.912 HER2-POSITIVE CARCINOMA OF LEFT BREAST (HCC): ICD-10-CM

## 2020-04-10 LAB
ALBUMIN SERPL-MCNC: 3.3 G/DL (ref 3.5–5)
ALBUMIN/GLOB SERPL: 1 {RATIO} (ref 1.1–2.2)
ALP SERPL-CCNC: 112 U/L (ref 45–117)
ALT SERPL-CCNC: 104 U/L (ref 12–78)
ANION GAP SERPL CALC-SCNC: 4 MMOL/L (ref 5–15)
AST SERPL-CCNC: 38 U/L (ref 15–37)
BASOPHILS # BLD: 0.1 K/UL (ref 0–0.1)
BASOPHILS NFR BLD: 1 % (ref 0–1)
BILIRUB SERPL-MCNC: 0.3 MG/DL (ref 0.2–1)
BUN SERPL-MCNC: 20 MG/DL (ref 6–20)
BUN/CREAT SERPL: 19 (ref 12–20)
CALCIUM SERPL-MCNC: 8.5 MG/DL (ref 8.5–10.1)
CHLORIDE SERPL-SCNC: 108 MMOL/L (ref 97–108)
CO2 SERPL-SCNC: 26 MMOL/L (ref 21–32)
CREAT SERPL-MCNC: 1.05 MG/DL (ref 0.55–1.02)
DIFFERENTIAL METHOD BLD: ABNORMAL
EOSINOPHIL # BLD: 0 K/UL (ref 0–0.4)
EOSINOPHIL NFR BLD: 0 % (ref 0–7)
ERYTHROCYTE [DISTWIDTH] IN BLOOD BY AUTOMATED COUNT: 15.9 % (ref 11.5–14.5)
GLOBULIN SER CALC-MCNC: 3.2 G/DL (ref 2–4)
GLUCOSE SERPL-MCNC: 99 MG/DL (ref 65–100)
HCT VFR BLD AUTO: 30.9 % (ref 35–47)
HGB BLD-MCNC: 10.5 G/DL (ref 11.5–16)
IMM GRANULOCYTES # BLD AUTO: 0 K/UL (ref 0–0.04)
IMM GRANULOCYTES NFR BLD AUTO: 0 % (ref 0–0.5)
LYMPHOCYTES # BLD: 0.7 K/UL (ref 0.8–3.5)
LYMPHOCYTES NFR BLD: 14 % (ref 12–49)
MCH RBC QN AUTO: 31.1 PG (ref 26–34)
MCHC RBC AUTO-ENTMCNC: 34 G/DL (ref 30–36.5)
MCV RBC AUTO: 91.4 FL (ref 80–99)
MONOCYTES # BLD: 0.3 K/UL (ref 0–1)
MONOCYTES NFR BLD: 6 % (ref 5–13)
NEUTS SEG # BLD: 3.8 K/UL (ref 1.8–8)
NEUTS SEG NFR BLD: 79 % (ref 32–75)
NRBC # BLD: 0 K/UL (ref 0–0.01)
NRBC BLD-RTO: 0 PER 100 WBC
PLATELET # BLD AUTO: 230 K/UL (ref 150–400)
PMV BLD AUTO: 10 FL (ref 8.9–12.9)
POTASSIUM SERPL-SCNC: 3.8 MMOL/L (ref 3.5–5.1)
PROT SERPL-MCNC: 6.5 G/DL (ref 6.4–8.2)
RBC # BLD AUTO: 3.38 M/UL (ref 3.8–5.2)
RBC MORPH BLD: ABNORMAL
SODIUM SERPL-SCNC: 138 MMOL/L (ref 136–145)
WBC # BLD AUTO: 4.9 K/UL (ref 3.6–11)

## 2020-04-10 PROCEDURE — 74011250636 HC RX REV CODE- 250/636: Performed by: INTERNAL MEDICINE

## 2020-04-10 PROCEDURE — 96367 TX/PROPH/DG ADDL SEQ IV INF: CPT

## 2020-04-10 PROCEDURE — 77030012965 HC NDL HUBR BBMI -A

## 2020-04-10 PROCEDURE — 96417 CHEMO IV INFUS EACH ADDL SEQ: CPT

## 2020-04-10 PROCEDURE — 96413 CHEMO IV INFUSION 1 HR: CPT

## 2020-04-10 PROCEDURE — 80053 COMPREHEN METABOLIC PANEL: CPT

## 2020-04-10 PROCEDURE — 85025 COMPLETE CBC W/AUTO DIFF WBC: CPT

## 2020-04-10 PROCEDURE — 96377 APPLICATON ON-BODY INJECTOR: CPT

## 2020-04-10 PROCEDURE — 96375 TX/PRO/DX INJ NEW DRUG ADDON: CPT

## 2020-04-10 PROCEDURE — 74011000258 HC RX REV CODE- 258: Performed by: INTERNAL MEDICINE

## 2020-04-10 PROCEDURE — 36415 COLL VENOUS BLD VENIPUNCTURE: CPT

## 2020-04-10 RX ORDER — HEPARIN 100 UNIT/ML
300-500 SYRINGE INTRAVENOUS AS NEEDED
Status: ACTIVE | OUTPATIENT
Start: 2020-04-10 | End: 2020-04-10

## 2020-04-10 RX ORDER — SODIUM CHLORIDE 9 MG/ML
25 INJECTION, SOLUTION INTRAVENOUS CONTINUOUS
Status: DISPENSED | OUTPATIENT
Start: 2020-04-10 | End: 2020-04-10

## 2020-04-10 RX ORDER — SODIUM CHLORIDE 9 MG/ML
10 INJECTION INTRAMUSCULAR; INTRAVENOUS; SUBCUTANEOUS AS NEEDED
Status: ACTIVE | OUTPATIENT
Start: 2020-04-10 | End: 2020-04-10

## 2020-04-10 RX ORDER — SODIUM CHLORIDE 0.9 % (FLUSH) 0.9 %
10 SYRINGE (ML) INJECTION AS NEEDED
Status: DISPENSED | OUTPATIENT
Start: 2020-04-10 | End: 2020-04-10

## 2020-04-10 RX ORDER — PALONOSETRON 0.05 MG/ML
0.25 INJECTION, SOLUTION INTRAVENOUS ONCE
Status: COMPLETED | OUTPATIENT
Start: 2020-04-10 | End: 2020-04-10

## 2020-04-10 RX ORDER — DEXAMETHASONE SODIUM PHOSPHATE 4 MG/ML
10 INJECTION, SOLUTION INTRA-ARTICULAR; INTRALESIONAL; INTRAMUSCULAR; INTRAVENOUS; SOFT TISSUE ONCE
Status: COMPLETED | OUTPATIENT
Start: 2020-04-10 | End: 2020-04-10

## 2020-04-10 RX ORDER — ONDANSETRON 2 MG/ML
8 INJECTION INTRAMUSCULAR; INTRAVENOUS AS NEEDED
Status: DISCONTINUED | OUTPATIENT
Start: 2020-04-10 | End: 2020-04-11 | Stop reason: HOSPADM

## 2020-04-10 RX ADMIN — PEGFILGRASTIM 6 MG: KIT SUBCUTANEOUS at 15:30

## 2020-04-10 RX ADMIN — DEXAMETHASONE SODIUM PHOSPHATE 10 MG: 4 INJECTION, SOLUTION INTRAMUSCULAR; INTRAVENOUS at 13:06

## 2020-04-10 RX ADMIN — PALONOSETRON 0.25 MG: 0.25 INJECTION, SOLUTION INTRAVENOUS at 13:06

## 2020-04-10 RX ADMIN — DOCETAXEL 142 MG: 10 INJECTION, SOLUTION INTRAVENOUS at 13:29

## 2020-04-10 RX ADMIN — Medication 50 ML: at 15:30

## 2020-04-10 RX ADMIN — PERTUZUMAB 420 MG: 30 INJECTION, SOLUTION, CONCENTRATE INTRAVENOUS at 12:30

## 2020-04-10 RX ADMIN — SODIUM CHLORIDE 150 MG: 900 INJECTION, SOLUTION INTRAVENOUS at 11:01

## 2020-04-10 RX ADMIN — SODIUM CHLORIDE 25 ML/HR: 900 INJECTION, SOLUTION INTRAVENOUS at 11:01

## 2020-04-10 RX ADMIN — ONDANSETRON 8 MG: 2 INJECTION INTRAMUSCULAR; INTRAVENOUS at 15:37

## 2020-04-10 RX ADMIN — Medication 500 UNITS: at 15:30

## 2020-04-10 RX ADMIN — TRASTUZUMAB 450 MG: 150 INJECTION, POWDER, LYOPHILIZED, FOR SOLUTION INTRAVENOUS at 11:53

## 2020-04-10 RX ADMIN — CARBOPLATIN 700 MG: 10 INJECTION, SOLUTION INTRAVENOUS at 14:43

## 2020-04-10 NOTE — PROGRESS NOTES
Outpatient Infusion Center - Chemotherapy Progress Note    0910 -  Pt admit to [facility] for C3 TCHP in stable condition. Assessment completed, pt reports diarrhea for the week after last treatment. Reports that she took anti-diarrheal meds. Also reports passing out x1. Was in contact with Dr. Denise Melgar office. Reports foods not tasting the same but appetite improved during the last week. R chest PAC accessed with 0.75\"  Lenka Click with positive blood return. Labs drawn per MD order and sent to lab. Line flushed, clamped, Curos Cap applied to end clave. Chemotherapy Flowsheet 4/10/2020   Cycle C3   Date 4/10/2020   Drug / Regimen TCHP   Pre Meds given   Notes given       Visit Vitals  /65 (BP 1 Location: Left arm, BP Patient Position: Sitting)   Pulse 89   Temp 98.4 °F (36.9 °C)   Ht 5' 6\" (1.676 m)   Wt 77.1 kg (169 lb 14.4 oz)   SpO2 97%   BMI 27.42 kg/m²         Recent Results (from the past 12 hour(s))   CBC WITH AUTOMATED DIFF    Collection Time: 04/10/20  9:30 AM   Result Value Ref Range    WBC 4.9 3.6 - 11.0 K/uL    RBC 3.38 (L) 3.80 - 5.20 M/uL    HGB 10.5 (L) 11.5 - 16.0 g/dL    HCT 30.9 (L) 35.0 - 47.0 %    MCV 91.4 80.0 - 99.0 FL    MCH 31.1 26.0 - 34.0 PG    MCHC 34.0 30.0 - 36.5 g/dL    RDW 15.9 (H) 11.5 - 14.5 %    PLATELET 166 026 - 455 K/uL    MPV 10.0 8.9 - 12.9 FL    NRBC 0.0 0  WBC    ABSOLUTE NRBC 0.00 0.00 - 0.01 K/uL    NEUTROPHILS 79 (H) 32 - 75 %    LYMPHOCYTES 14 12 - 49 %    MONOCYTES 6 5 - 13 %    EOSINOPHILS 0 0 - 7 %    BASOPHILS 1 0 - 1 %    IMMATURE GRANULOCYTES 0 0.0 - 0.5 %    ABS. NEUTROPHILS 3.8 1.8 - 8.0 K/UL    ABS. LYMPHOCYTES 0.7 (L) 0.8 - 3.5 K/UL    ABS. MONOCYTES 0.3 0.0 - 1.0 K/UL    ABS. EOSINOPHILS 0.0 0.0 - 0.4 K/UL    ABS. BASOPHILS 0.1 0.0 - 0.1 K/UL    ABS. IMM.  GRANS. 0.0 0.00 - 0.04 K/UL    DF AUTOMATED      RBC COMMENTS NORMOCYTIC, NORMOCHROMIC     METABOLIC PANEL, COMPREHENSIVE    Collection Time: 04/10/20  9:30 AM   Result Value Ref Range    Sodium 138 136 - 145 mmol/L    Potassium 3.8 3.5 - 5.1 mmol/L    Chloride 108 97 - 108 mmol/L    CO2 26 21 - 32 mmol/L    Anion gap 4 (L) 5 - 15 mmol/L    Glucose 99 65 - 100 mg/dL    BUN 20 6 - 20 MG/DL    Creatinine 1.05 (H) 0.55 - 1.02 MG/DL    BUN/Creatinine ratio 19 12 - 20      GFR est AA >60 >60 ml/min/1.73m2    GFR est non-AA 56 (L) >60 ml/min/1.73m2    Calcium 8.5 8.5 - 10.1 MG/DL    Bilirubin, total 0.3 0.2 - 1.0 MG/DL    ALT (SGPT) 104 (H) 12 - 78 U/L    AST (SGOT) 38 (H) 15 - 37 U/L    Alk. phosphatase 112 45 - 117 U/L    Protein, total 6.5 6.4 - 8.2 g/dL    Albumin 3.3 (L) 3.5 - 5.0 g/dL    Globulin 3.2 2.0 - 4.0 g/dL    A-G Ratio 1.0 (L) 1.1 - 2.2          Medications:  NS KVO  Herceptin 450mg over 30min  Perjeta 420mg over 30min  Emend 150mg over 20min  Decadron 10mg IVP (confirmed home dose)  Aloxi 0.25mg IV  Taxotere 142mg over 1hr  Carboplatin 700mg over 30min  Neulasta OB  Zofran 8mg IVP for nausea at end of treatment       C/o nausea at end of treatment today Port maintained positive blood return throughout treatment, flushed with positive blood return at conclusion, and de-accessed. 1545 - D/c home in no distress. Pt aware of next [facility] appointment scheduled for: 5/1/20 at 0900.      Future Appointments   Date Time Provider Viri Gonzales   4/10/2020  9:00 AM [facility] CHAIR 1 [facility] REG   4/10/2020  9:30 AM Tobi Ayon NP ONCMR ANIKA SCHED   5/1/2020  9:00 AM [place] [facility] CHAIR 1 [facility] REG   5/22/2020  9:00 AM [place] [facility] CHAIR 1 [facility] REG   6/12/2020  9:00 AM [facility] CHAIR 1 [facility] REG

## 2020-04-10 NOTE — PROGRESS NOTES
[address], [address] [place] [address] Derek Samuel, [address] [facility]  [phone]      Oncology Progress note      Patient: Kimberly Mahmood MRN: [id number]  SSN: [id number]    YOB: 1975  Age: 53 y.o. Sex: female        Diagnosis:     1. Left breast carcinoma: Dx: 1/31/2020  T2 N1 M0 (Stage IIA) Invasive ductal carcinoma, Tumor size 2 cm, grade 3, ER 0%, IN 0%, Her 2 3+     Treatment:     1. Neoadjuvant chemotherapy   Docetaxel, Carboplatin, Trastuzumab + Pertuzumab   Cycle 3 day 1    Subjective:      Kimberly Mahmood is a 53 y.o. female who I am seeing in follow up for a new diagnosis of left sided invasive breast carcinoma. She felt a lump in her left breast. Dr. Maine Nicole obtained a diagnostic mammogram. The mammogram showed an abnormality in the upper quadrant of the left breast. A biopsy of the lesion revealed a ER 0% IN 0% and Her 2 3+ Gr 3 IDC of the left breast.     Ms. Cristóbal Real returns today for her neoadjuvant treatment. Diarrhea is manageable with meds. Eating ok. Appetite is fair.       Review of Systems:    Constitutional: negative  Eyes: negative  Ears, Nose, Mouth, Throat, and Face: negative  Respiratory: negative  Cardiovascular: negative  Gastrointestinal: negative  Genitourinary:negative  Integument/Breast: negative  Hematologic/Lymphatic: negative  Musculoskeletal:negative  Neurological: negative    Past Medical History:   Diagnosis Date    Anxiety and depression     Cancer ([facility] [address] 01/2020    left breast ca er/pr (-) her 2 (+)     Past Surgical History:   Procedure Laterality Date    HX BREAST BIOPSY Left 07/24/2018    Left sebaceous cyst surgically removed    HX CYST INCISION AND DRAINAGE Left 20+ yrs ago    HX HEENT      tonsillectomy    IR INSERT TUNL CVC W PORT OVER 5 YEARS  2/25/2020      Family History   Problem Relation Age of Onset    Hypertension Mother     Kidney Disease Mother     Thyroid Disease Mother      Social History     Tobacco Use    Smoking status: Never Smoker    Smokeless tobacco: Never Used   Substance Use Topics    Alcohol use: No      Prior to Admission medications    Medication Sig Start Date End Date Taking? Authorizing Provider   ondansetron (ZOFRAN ODT) 4 mg disintegrating tablet Take 1 Tab by mouth every eight (8) hours as needed for Nausea or Vomiting. 2/11/20   Anne Slaughter NP   prochlorperazine (COMPAZINE) 10 mg tablet Take 0.5 Tabs by mouth every six (6) hours as needed for Nausea for up to 30 doses. 2/11/20   Anne Slaughter NP   diphenoxylate-atropine (LOMOTIL) 2.5-0.025 mg per tablet Take 1 Tab by mouth four (4) times daily as needed for Diarrhea. Max Daily Amount: 4 Tabs. 2/11/20   Anne Slaughter NP   lidocaine-prilocaine (EMLA) topical cream Apply  to affected area as needed for Pain. 2/11/20   Alexa Marquez NP   dexAMETHasone (DECADRON) 4 mg tablet Take 8 mg by mouth two (2) times daily (with meals). The day of chemotherapy and the day after chemotherapy. 2/11/20   Alexa Marquez NP   VIIBRYD 40 mg tab tablet TAKE 1 TABLET BY MOUTH EVERY DAY 6/19/18   Provider, Historical   ALPRAZolam (XANAX) 0.5 mg tablet 0.5 mg as needed. 1/21/16   Provider, Historical          No Known Allergies      Objective:     Vitals:    04/10/20 0936   BP: 118/65   Pulse: 89   Temp: 98.4 °F (36.9 °C)   SpO2: 97%   Weight: 169 lb (76.7 kg)   Height: 5' 6\" (1.676 m)      Pain Scale: 0/10      Physical Exam:    GENERAL: alert, cooperative, no distress, appears stated age  EYE: conjunctivae/corneas clear. PERRL, EOM's intact  LYMPHATIC: Cervical, supraclavicular, and axillary nodes normal.   THROAT & NECK: normal and no erythema or exudates noted. LUNG: clear to auscultation bilaterally  HEART: regular rate and rhythm, S1, S2 normal, no murmur, click, rub or gallop  ABDOMEN: soft, non-tender.  Bowel sounds normal. No masses,  no organomegaly  EXTREMITIES:  extremities normal, atraumatic, no cyanosis or edema  SKIN: Normal.  NEUROLOGIC: AOx3. Gait normal. Reflexes and motor strength normal and symmetric. Cranial nerves 2-12 and sensation grossly intact. Lab Results   Component Value Date/Time    WBC 4.9 04/10/2020 09:30 AM    HGB 10.5 (L) 04/10/2020 09:30 AM    HCT 30.9 (L) 04/10/2020 09:30 AM    PLATELET 706 76/08/0519 09:30 AM    MCV 91.4 04/10/2020 09:30 AM       Lab Results   Component Value Date/Time    Sodium 138 04/10/2020 09:30 AM    Potassium 3.8 04/10/2020 09:30 AM    Chloride 108 04/10/2020 09:30 AM    CO2 26 04/10/2020 09:30 AM    Anion gap 4 (L) 04/10/2020 09:30 AM    Glucose 99 04/10/2020 09:30 AM    BUN 20 04/10/2020 09:30 AM    Creatinine 1.05 (H) 04/10/2020 09:30 AM    BUN/Creatinine ratio 19 04/10/2020 09:30 AM    GFR est AA >60 04/10/2020 09:30 AM    GFR est non-AA 56 (L) 04/10/2020 09:30 AM    Calcium 8.5 04/10/2020 09:30 AM    Bilirubin, total 0.3 04/10/2020 09:30 AM    AST (SGOT) 38 (H) 04/10/2020 09:30 AM    Alk. phosphatase 112 04/10/2020 09:30 AM    Protein, total 6.5 04/10/2020 09:30 AM    Albumin 3.3 (L) 04/10/2020 09:30 AM    Globulin 3.2 04/10/2020 09:30 AM    A-G Ratio 1.0 (L) 04/10/2020 09:30 AM    ALT (SGPT) 104 (H) 04/10/2020 09:30 AM           Assessment:     1. Left breast carcinoma:  T2 N1 M0 (Stage IIA) Invasive ductal carcinoma, Tumor size 2 cm, grade 3, ER 0%, DE 0%, Her 2 3+        ECOG PS 0  Intent of Treatment - curative  Prognosis: excellent    The most effective combination for neoadjuvant treatment for Her 2 +ve locally advanced breast cancer is dual Her blockade with Pertuzumab/Herceptin in combination with Taxanes. There are two trials which have displayed a high response rate with dual Her blockade. Receiving neoadjuvant chemotherapy   Docetaxel, Carboplatin, Trastuzumab + Pertuzumab   Cycle 3 day 1    Tolerating treatment   A detailed system by system evaluation of side effect was performed to assess chemotherapy related toxicity.    Blood counts are acceptable. Results reviewed with the patient. 2. Anemia from chemotherapy    Observation      3. CINV    Antiemetics      4. Diarrhea    Imodium + lomotil      Plan:       · Proceed with C#3 TCH-P (Trastuzumab 8mg/kg load with cycle 1 then 6mg/kg, Docetaxel 75mg/m2, Carboplatin AUC 6, Pertuzumab 840mg load with cycle 1 then 420mg) given every 3 weeks x 6 cycles  · Labs: CBC, CMP prior to each treatment. Troponin, proBNP prior to treatment and every 12 weeks  · Antiemetic Prophylaxis: Palonosetron and dexamethasone prior to chemo  · PRN Antiemetics: Ondansetron, Compazine  · Swelling prophylaxis: Dexmethasone 8mg bid the day before, day of and day after chemotherapy  · TTE prior to chemotherapy and every 12 weeks while on Trastuzumab  · Neulasta 24-72 hours after each treatment  · Return to clinic 3 weeks      I saw the patient in conjunction with SULAIMAN Wilson. Signed by: Pawel Mcdonald MD                     April 10, 2020        CC. Marjan Martinez MD  CC.  Larry Messina MD

## 2020-04-10 NOTE — LETTER
4/10/20 Patient: Jose Hernández YOB: 1975 Date of Visit: 4/10/2020 Leonora Lima MD 
[address] [facility] [address] VIA Facsimile: [phone] Dear Leonora Lima MD, Thank you for referring Ms. Jeannie Larios to [address] [place] [address] for evaluation. My notes for this consultation are attached. If you have questions, please do not hesitate to call me. I look forward to following your patient along with you.  
 
 
Sincerely, 
 
Alisa Rowan NP

## 2020-04-10 NOTE — PROGRESS NOTES
Yogi Minor is a 53 y.o. cauc female here for 3 week follow up for:  Chief Complaint   Patient presents with    Breast Cancer     left    Chemotherapy   Cycle 3 Day 1 Docetaxel, Carboplatin, Trastuzumab + Pertuzumab

## 2020-04-15 DIAGNOSIS — Z17.1 MALIGNANT NEOPLASM OF UPPER-OUTER QUADRANT OF LEFT BREAST IN FEMALE, ESTROGEN RECEPTOR NEGATIVE (HCC): ICD-10-CM

## 2020-04-15 DIAGNOSIS — R11.2 CINV (CHEMOTHERAPY-INDUCED NAUSEA AND VOMITING): ICD-10-CM

## 2020-04-15 DIAGNOSIS — T45.1X5A CINV (CHEMOTHERAPY-INDUCED NAUSEA AND VOMITING): ICD-10-CM

## 2020-04-15 DIAGNOSIS — C50.412 MALIGNANT NEOPLASM OF UPPER-OUTER QUADRANT OF LEFT BREAST IN FEMALE, ESTROGEN RECEPTOR NEGATIVE (HCC): ICD-10-CM

## 2020-04-15 RX ORDER — ONDANSETRON 4 MG/1
4 TABLET, ORALLY DISINTEGRATING ORAL
Qty: 30 TAB | Refills: 1 | Status: ON HOLD | OUTPATIENT
Start: 2020-04-15 | End: 2021-04-06

## 2020-04-15 NOTE — TELEPHONE ENCOUNTER
Per Tammi Or from Dr. Aly Elliott ODT 4MG EVERY 8 HOURS AS NEEDED FOR NAUSEA/AND OR VOMITING. QUANTITY 30 REFILL 1

## 2020-04-29 ENCOUNTER — OFFICE VISIT (OUTPATIENT)
Dept: SURGERY | Age: 45
End: 2020-04-29

## 2020-04-29 VITALS
BODY MASS INDEX: 27.16 KG/M2 | HEIGHT: 66 IN | TEMPERATURE: 97.2 F | SYSTOLIC BLOOD PRESSURE: 110 MMHG | DIASTOLIC BLOOD PRESSURE: 63 MMHG | WEIGHT: 169 LBS | HEART RATE: 101 BPM

## 2020-04-29 DIAGNOSIS — C77.3 BREAST CANCER METASTASIZED TO AXILLARY LYMPH NODE, LEFT (HCC): Primary | ICD-10-CM

## 2020-04-29 DIAGNOSIS — C50.912 BREAST CANCER METASTASIZED TO AXILLARY LYMPH NODE, LEFT (HCC): Primary | ICD-10-CM

## 2020-04-29 RX ORDER — ETONOGESTREL AND ETHINYL ESTRADIOL 11.7; 2.7 MG/1; MG/1
INSERT, EXTENDED RELEASE VAGINAL
COMMUNITY

## 2020-04-29 NOTE — PATIENT INSTRUCTIONS

## 2020-04-29 NOTE — PROGRESS NOTES
HISTORY OF PRESENT ILLNESS  Nico Ro is a 53 y.o. female. HPI  ESTABLISHED patient here for follow up during treatment for LEFT breast cancer with neoadjuvent chemotherapy. She is doing well. Denies pain. She is unable to palpate the lump after 3 chemo treatments. Breast cancer-  1/31/2020 - T2 N1 M0 (Stage IIA) Invasive ductal carcinoma, Tumor size 2 cm, grade 3, ER 0%, NC 0%, Her 2 3+  2/11/2020 - met with Dr. Allison Blancas. Discussed neoadjuvant chemotherapy ([address] + P)  2/24/2020 - RIGHT breast MRI guided biopsy. Benign. 2/25/2020 - port insertion in IR  2/28/2020 - started neoadjuvant TCH-P - Dr. Allison Blancas     No family history of breast or ovarian cancer. The patient is genetic test negative [facility] Breast Next 2/2020). Review of Systems   All other systems reviewed and are negative. Physical Exam  Vitals signs and nursing note reviewed. Chest:      Breasts:         Left: No swelling, bleeding, inverted nipple, mass, nipple discharge, skin change or tenderness. Lymphadenopathy:      Upper Body:      Left upper body: No axillary adenopathy. BREAST ULTRASOUND, Preop planning  Indication:preop planning  left Breast 2L99    Technique: The area was scanned using a high-frequency linear-array near-field transducer  Findings: left axillary nodes smaller, clip identified. No breast mass on us  Impression: Biopsy site visible with ultrasound  Disposition:  Will schedule post chemo mri   ASSESSMENT and PLAN    ICD-10-CM ICD-9-CM    1. Breast cancer metastasized to axillary lymph node, left (HCC) C50.912 174.9     C77.3 196.3    Breast cancer-  1/31/2020 - T2 N1 M0 (Stage IIA) Invasive ductal carcinoma, Tumor size 2 cm, grade 3, ER 0%, NC 0%, Her 2 3+  2/11/2020 - met with Dr. Allison Blancas. Discussed neoadjuvant chemotherapy ([address] + P)  2/24/2020 - RIGHT breast MRI guided biopsy. Benign.   2/25/2020 - port insertion in IR  2/28/2020 - started neoadjuvant TCH-P - Dr. Allison Blancas     No family history of breast or ovarian cancer. The patient is genetic test negative [facility] Breast Next 2/2020)    Will check post natalie mri. Plan left us guided lumpectomy, sln biopsy poss alnd. Will need post surgical xrt. Refer to rad onc.    She was happy

## 2020-04-30 ENCOUNTER — TELEPHONE (OUTPATIENT)
Dept: SURGERY | Age: 45
End: 2020-04-30

## 2020-04-30 DIAGNOSIS — C50.912 MALIGNANT NEOPLASM OF LEFT FEMALE BREAST, UNSPECIFIED ESTROGEN RECEPTOR STATUS, UNSPECIFIED SITE OF BREAST (HCC): Primary | ICD-10-CM

## 2020-04-30 DIAGNOSIS — C77.3 BREAST CANCER METASTASIZED TO AXILLARY LYMPH NODE, LEFT (HCC): Primary | ICD-10-CM

## 2020-04-30 DIAGNOSIS — C50.912 BREAST CANCER METASTASIZED TO AXILLARY LYMPH NODE, LEFT (HCC): Primary | ICD-10-CM

## 2020-05-01 ENCOUNTER — OFFICE VISIT (OUTPATIENT)
Dept: ONCOLOGY | Age: 45
End: 2020-05-01

## 2020-05-01 ENCOUNTER — HOSPITAL ENCOUNTER (OUTPATIENT)
Dept: INFUSION THERAPY | Age: 45
Discharge: HOME | End: 2020-05-01
Payer: COMMERCIAL

## 2020-05-01 VITALS
WEIGHT: 172.5 LBS | DIASTOLIC BLOOD PRESSURE: 68 MMHG | OXYGEN SATURATION: 99 % | HEIGHT: 66 IN | BODY MASS INDEX: 27.72 KG/M2 | RESPIRATION RATE: 18 BRPM | HEART RATE: 88 BPM | SYSTOLIC BLOOD PRESSURE: 114 MMHG | TEMPERATURE: 97.6 F

## 2020-05-01 VITALS
RESPIRATION RATE: 18 BRPM | TEMPERATURE: 96.9 F | OXYGEN SATURATION: 99 % | SYSTOLIC BLOOD PRESSURE: 120 MMHG | HEART RATE: 104 BPM | HEIGHT: 66 IN | DIASTOLIC BLOOD PRESSURE: 72 MMHG | WEIGHT: 172 LBS | BODY MASS INDEX: 27.64 KG/M2

## 2020-05-01 DIAGNOSIS — Z09 CHEMOTHERAPY FOLLOW-UP EXAMINATION: ICD-10-CM

## 2020-05-01 DIAGNOSIS — Z17.1 MALIGNANT NEOPLASM OF LEFT BREAST IN FEMALE, ESTROGEN RECEPTOR NEGATIVE, UNSPECIFIED SITE OF BREAST (HCC): Primary | ICD-10-CM

## 2020-05-01 DIAGNOSIS — Z51.81 ENCOUNTER FOR MONITORING CARDIOTOXIC DRUG THERAPY: ICD-10-CM

## 2020-05-01 DIAGNOSIS — R19.7 DIARRHEA, UNSPECIFIED TYPE: ICD-10-CM

## 2020-05-01 DIAGNOSIS — Z17.1 MALIGNANT NEOPLASM OF UPPER-OUTER QUADRANT OF LEFT BREAST IN FEMALE, ESTROGEN RECEPTOR NEGATIVE (HCC): Primary | ICD-10-CM

## 2020-05-01 DIAGNOSIS — C50.912 MALIGNANT NEOPLASM OF LEFT BREAST IN FEMALE, ESTROGEN RECEPTOR NEGATIVE, UNSPECIFIED SITE OF BREAST (HCC): Primary | ICD-10-CM

## 2020-05-01 DIAGNOSIS — C50.412 MALIGNANT NEOPLASM OF UPPER-OUTER QUADRANT OF LEFT BREAST IN FEMALE, ESTROGEN RECEPTOR NEGATIVE (HCC): Primary | ICD-10-CM

## 2020-05-01 DIAGNOSIS — D64.81 ANTINEOPLASTIC CHEMOTHERAPY INDUCED ANEMIA: ICD-10-CM

## 2020-05-01 DIAGNOSIS — C50.912 HER2-POSITIVE CARCINOMA OF LEFT BREAST (HCC): ICD-10-CM

## 2020-05-01 DIAGNOSIS — T45.1X5A ANTINEOPLASTIC CHEMOTHERAPY INDUCED ANEMIA: ICD-10-CM

## 2020-05-01 DIAGNOSIS — Z79.899 ENCOUNTER FOR MONITORING CARDIOTOXIC DRUG THERAPY: ICD-10-CM

## 2020-05-01 LAB
ALBUMIN SERPL-MCNC: 3.2 G/DL (ref 3.5–5)
ALBUMIN/GLOB SERPL: 1 {RATIO} (ref 1.1–2.2)
ALP SERPL-CCNC: 97 U/L (ref 45–117)
ALT SERPL-CCNC: 68 U/L (ref 12–78)
ANION GAP SERPL CALC-SCNC: 5 MMOL/L (ref 5–15)
AST SERPL-CCNC: 45 U/L (ref 15–37)
BASOPHILS # BLD: 0 K/UL (ref 0–0.1)
BASOPHILS NFR BLD: 1 % (ref 0–1)
BILIRUB SERPL-MCNC: 0.6 MG/DL (ref 0.2–1)
BUN SERPL-MCNC: 20 MG/DL (ref 6–20)
BUN/CREAT SERPL: 23 (ref 12–20)
CALCIUM SERPL-MCNC: 8.8 MG/DL (ref 8.5–10.1)
CHLORIDE SERPL-SCNC: 109 MMOL/L (ref 97–108)
CO2 SERPL-SCNC: 26 MMOL/L (ref 21–32)
CREAT SERPL-MCNC: 0.87 MG/DL (ref 0.55–1.02)
DIFFERENTIAL METHOD BLD: ABNORMAL
EOSINOPHIL # BLD: 0 K/UL (ref 0–0.4)
EOSINOPHIL NFR BLD: 0 % (ref 0–7)
ERYTHROCYTE [DISTWIDTH] IN BLOOD BY AUTOMATED COUNT: 17.2 % (ref 11.5–14.5)
GLOBULIN SER CALC-MCNC: 3.3 G/DL (ref 2–4)
GLUCOSE SERPL-MCNC: 96 MG/DL (ref 65–100)
HCT VFR BLD AUTO: 30.5 % (ref 35–47)
HGB BLD-MCNC: 10.3 G/DL (ref 11.5–16)
IMM GRANULOCYTES # BLD AUTO: 0 K/UL (ref 0–0.04)
IMM GRANULOCYTES NFR BLD AUTO: 1 % (ref 0–0.5)
LYMPHOCYTES # BLD: 1.2 K/UL (ref 0.8–3.5)
LYMPHOCYTES NFR BLD: 28 % (ref 12–49)
MCH RBC QN AUTO: 32.3 PG (ref 26–34)
MCHC RBC AUTO-ENTMCNC: 33.8 G/DL (ref 30–36.5)
MCV RBC AUTO: 95.6 FL (ref 80–99)
MONOCYTES # BLD: 0.5 K/UL (ref 0–1)
MONOCYTES NFR BLD: 12 % (ref 5–13)
NEUTS SEG # BLD: 2.5 K/UL (ref 1.8–8)
NEUTS SEG NFR BLD: 59 % (ref 32–75)
NRBC # BLD: 0 K/UL (ref 0–0.01)
NRBC BLD-RTO: 0 PER 100 WBC
PLATELET # BLD AUTO: 263 K/UL (ref 150–400)
PMV BLD AUTO: 9.5 FL (ref 8.9–12.9)
POTASSIUM SERPL-SCNC: 3.9 MMOL/L (ref 3.5–5.1)
PROT SERPL-MCNC: 6.5 G/DL (ref 6.4–8.2)
RBC # BLD AUTO: 3.19 M/UL (ref 3.8–5.2)
SODIUM SERPL-SCNC: 140 MMOL/L (ref 136–145)
WBC # BLD AUTO: 4.3 K/UL (ref 3.6–11)

## 2020-05-01 PROCEDURE — 96377 APPLICATON ON-BODY INJECTOR: CPT

## 2020-05-01 PROCEDURE — 85025 COMPLETE CBC W/AUTO DIFF WBC: CPT

## 2020-05-01 PROCEDURE — 74011250636 HC RX REV CODE- 250/636: Performed by: INTERNAL MEDICINE

## 2020-05-01 PROCEDURE — 74011000258 HC RX REV CODE- 258: Performed by: INTERNAL MEDICINE

## 2020-05-01 PROCEDURE — 96417 CHEMO IV INFUS EACH ADDL SEQ: CPT

## 2020-05-01 PROCEDURE — 96367 TX/PROPH/DG ADDL SEQ IV INF: CPT

## 2020-05-01 PROCEDURE — 80053 COMPREHEN METABOLIC PANEL: CPT

## 2020-05-01 PROCEDURE — 36415 COLL VENOUS BLD VENIPUNCTURE: CPT

## 2020-05-01 PROCEDURE — 96413 CHEMO IV INFUSION 1 HR: CPT

## 2020-05-01 PROCEDURE — 77030012965 HC NDL HUBR BBMI -A

## 2020-05-01 PROCEDURE — 96375 TX/PRO/DX INJ NEW DRUG ADDON: CPT

## 2020-05-01 RX ORDER — DEXAMETHASONE SODIUM PHOSPHATE 4 MG/ML
10 INJECTION, SOLUTION INTRA-ARTICULAR; INTRALESIONAL; INTRAMUSCULAR; INTRAVENOUS; SOFT TISSUE ONCE
Status: COMPLETED | OUTPATIENT
Start: 2020-05-01 | End: 2020-05-01

## 2020-05-01 RX ORDER — PALONOSETRON 0.05 MG/ML
0.25 INJECTION, SOLUTION INTRAVENOUS ONCE
Status: COMPLETED | OUTPATIENT
Start: 2020-05-01 | End: 2020-05-01

## 2020-05-01 RX ORDER — SODIUM CHLORIDE 9 MG/ML
10 INJECTION INTRAMUSCULAR; INTRAVENOUS; SUBCUTANEOUS AS NEEDED
Status: ACTIVE | OUTPATIENT
Start: 2020-05-01 | End: 2020-05-01

## 2020-05-01 RX ORDER — HEPARIN 100 UNIT/ML
300-500 SYRINGE INTRAVENOUS AS NEEDED
Status: ACTIVE | OUTPATIENT
Start: 2020-05-01 | End: 2020-05-01

## 2020-05-01 RX ORDER — ONDANSETRON 2 MG/ML
8 INJECTION INTRAMUSCULAR; INTRAVENOUS AS NEEDED
Status: DISCONTINUED | OUTPATIENT
Start: 2020-05-01 | End: 2020-05-02 | Stop reason: HOSPADM

## 2020-05-01 RX ORDER — SODIUM CHLORIDE 0.9 % (FLUSH) 0.9 %
10 SYRINGE (ML) INJECTION AS NEEDED
Status: DISPENSED | OUTPATIENT
Start: 2020-05-01 | End: 2020-05-01

## 2020-05-01 RX ORDER — SODIUM CHLORIDE 9 MG/ML
25 INJECTION, SOLUTION INTRAVENOUS CONTINUOUS
Status: DISPENSED | OUTPATIENT
Start: 2020-05-01 | End: 2020-05-01

## 2020-05-01 RX ADMIN — Medication 500 UNITS: at 15:20

## 2020-05-01 RX ADMIN — DOCETAXEL ANHYDROUS 142 MG: 10 INJECTION, SOLUTION INTRAVENOUS at 12:45

## 2020-05-01 RX ADMIN — SODIUM CHLORIDE 25 ML/HR: 900 INJECTION, SOLUTION INTRAVENOUS at 10:30

## 2020-05-01 RX ADMIN — Medication 10 ML: at 09:15

## 2020-05-01 RX ADMIN — DEXAMETHASONE SODIUM PHOSPHATE 10 MG: 4 INJECTION, SOLUTION INTRAMUSCULAR; INTRAVENOUS at 10:36

## 2020-05-01 RX ADMIN — ONDANSETRON 8 MG: 2 INJECTION INTRAMUSCULAR; INTRAVENOUS at 15:14

## 2020-05-01 RX ADMIN — PERTUZUMAB 420 MG: 30 INJECTION, SOLUTION, CONCENTRATE INTRAVENOUS at 12:00

## 2020-05-01 RX ADMIN — SODIUM CHLORIDE 150 MG: 900 INJECTION, SOLUTION INTRAVENOUS at 10:40

## 2020-05-01 RX ADMIN — TRASTUZUMAB 450 MG: 150 INJECTION, POWDER, LYOPHILIZED, FOR SOLUTION INTRAVENOUS at 11:25

## 2020-05-01 RX ADMIN — PEGFILGRASTIM 6 MG: KIT SUBCUTANEOUS at 15:15

## 2020-05-01 RX ADMIN — PALONOSETRON HYDROCHLORIDE 0.25 MG: 0.25 INJECTION, SOLUTION INTRAVENOUS at 10:36

## 2020-05-01 RX ADMIN — CARBOPLATIN 700 MG: 10 INJECTION, SOLUTION INTRAVENOUS at 14:00

## 2020-05-01 NOTE — PROGRESS NOTES
Yuli Almaguer is a 53 y.o. cauc female here for follow up for:  Chief Complaint   Patient presents with    Breast Cancer     (L)    Chemotherapy   Cycle 4 Day 1  TCH&P    1. Have you been to the ER, urgent care clinic since your last visit? Hospitalized since your last visit? No    2. Have you seen or consulted any other health care providers outside of the [address] [place] [address] since your last visit? Include any pap smears or colon screening. No    Pt states everything has been ok last few weeks. No concerns.

## 2020-05-01 NOTE — LETTER
5/1/20 Patient: Samy Lopez YOB: 1975 Date of Visit: 5/1/2020 Cristopher Velásquez MD 
[address] [address] VIA Facsimile: [phone] Dear Cristopher Velásquez MD, Thank you for referring Ms. Brian Peter to [address] [place] [address] for evaluation. My notes for this consultation are attached. If you have questions, please do not hesitate to call me. I look forward to following your patient along with you.  
 
 
Sincerely, 
 
Kathia Casey NP

## 2020-05-01 NOTE — PROGRESS NOTES
Outpatient Infusion Center - Chemotherapy Progress Note    0815- Pt admit to [facility] for TCHP C4 in stable condition. Assessment completed, pt reports intermittant nausea, generalized fatigue. . Right port accessed with positive blood return. Labs drawn per order and sent CBC, CMP. Patient denies pregnancy. Line flushed, clamped, Curos Cap applied to end clave. Chemotherapy Flowsheet 5/1/2020   Cycle C4   Date 5/1/2020   Drug / Regimen TCHP   Pre Meds given   Notes given        Patient Vitals for the past 12 hrs:   Temp Pulse Resp BP SpO2   05/01/20 1502 97.6 °F (36.4 °C) 88 18 114/68 --   05/01/20 0912 96.9 °F (36.1 °C) (!) 104 18 120/72 99 %        Recent Results (from the past 12 hour(s))   CBC WITH AUTOMATED DIFF    Collection Time: 05/01/20  9:19 AM   Result Value Ref Range    WBC 4.3 3.6 - 11.0 K/uL    RBC 3.19 (L) 3.80 - 5.20 M/uL    HGB 10.3 (L) 11.5 - 16.0 g/dL    HCT 30.5 (L) 35.0 - 47.0 %    MCV 95.6 80.0 - 99.0 FL    MCH 32.3 26.0 - 34.0 PG    MCHC 33.8 30.0 - 36.5 g/dL    RDW 17.2 (H) 11.5 - 14.5 %    PLATELET 496 606 - 497 K/uL    MPV 9.5 8.9 - 12.9 FL    NRBC 0.0 0  WBC    ABSOLUTE NRBC 0.00 0.00 - 0.01 K/uL    NEUTROPHILS 59 32 - 75 %    LYMPHOCYTES 28 12 - 49 %    MONOCYTES 12 5 - 13 %    EOSINOPHILS 0 0 - 7 %    BASOPHILS 1 0 - 1 %    IMMATURE GRANULOCYTES 1 (H) 0.0 - 0.5 %    ABS. NEUTROPHILS 2.5 1.8 - 8.0 K/UL    ABS. LYMPHOCYTES 1.2 0.8 - 3.5 K/UL    ABS. MONOCYTES 0.5 0.0 - 1.0 K/UL    ABS. EOSINOPHILS 0.0 0.0 - 0.4 K/UL    ABS. BASOPHILS 0.0 0.0 - 0.1 K/UL    ABS. IMM.  GRANS. 0.0 0.00 - 0.04 K/UL    DF AUTOMATED     METABOLIC PANEL, COMPREHENSIVE    Collection Time: 05/01/20  9:19 AM   Result Value Ref Range    Sodium 140 136 - 145 mmol/L    Potassium 3.9 3.5 - 5.1 mmol/L    Chloride 109 (H) 97 - 108 mmol/L    CO2 26 21 - 32 mmol/L    Anion gap 5 5 - 15 mmol/L    Glucose 96 65 - 100 mg/dL    BUN 20 6 - 20 MG/DL    Creatinine 0.87 0.55 - 1.02 MG/DL    BUN/Creatinine ratio 23 (H) 12 - 20 GFR est AA >60 >60 ml/min/1.73m2    GFR est non-AA >60 >60 ml/min/1.73m2    Calcium 8.8 8.5 - 10.1 MG/DL    Bilirubin, total 0.6 0.2 - 1.0 MG/DL    ALT (SGPT) 68 12 - 78 U/L    AST (SGOT) 45 (H) 15 - 37 U/L    Alk.  phosphatase 97 45 - 117 U/L    Protein, total 6.5 6.4 - 8.2 g/dL    Albumin 3.2 (L) 3.5 - 5.0 g/dL    Globulin 3.3 2.0 - 4.0 g/dL    A-G Ratio 1.0 (L) 1.1 - 2.2          Medications:  Medications Administered     0.9% sodium chloride infusion     Admin Date  05/01/2020 Action  New Bag Dose  25 mL/hr Rate  25 mL/hr Route  IntraVENous Administered By  Maty Simeon RN          CARBOplatin (PARAPLATIN) 700 mg in 0.9% sodium chloride 250 mL, overfill volume 25 mL chemo infusion     Admin Date  05/01/2020 Action  New Bag Dose  700 mg Rate  345 mL/hr Route  IntraVENous Administered By  Maty Simeon RN          dexamethasone (DECADRON) 4 mg/mL injection 10 mg     Admin Date  05/01/2020 Action  Given Dose  10 mg Route  IntraVENous Administered By  Maty Simeon RN          DOCEtaxeL (TAXOTERE) 142 mg in 0.9% sodium chloride 250 mL, overfill volume 25 mL chemo infusion     Admin Date  05/01/2020 Action  New Bag Dose  142 mg Rate  289 mL/hr Route  IntraVENous Administered By  Maty Simeon RN          fosaprepitant (EMEND) 150 mg in 0.9% sodium chloride 150 mL IVPB     Admin Date  05/01/2020 Action  Given Dose  150 mg Rate  450 mL/hr Route  IntraVENous Administered By  Maty Simeon RN          heparin (porcine) pf 300-500 Units     Admin Date  05/01/2020 Action  Given Dose  500 Units Route  InterCATHeter Administered By  Maty Simeon RN          ondansetron [facility]) injection 8 mg     Admin Date  05/01/2020 Action  Given Dose  8 mg Route  IntraVENous Administered By  Maty Simeon RN          palonosetron HCl (ALOXI) injection 0.25 mg     Admin Date  05/01/2020 Action  Given Dose  0.25 mg Route  IntraVENous Administered By  Dahlia Lars, RN          pegfilgrastim (Gonzalo Knight) wearable SQ injector 6 mg     Admin Date  05/01/2020  Left arm Action  Given Dose  6 mg Route  SubCUTAneous Administered By  Herbert Moses RN          pertuzumab (PERJETA) 420 mg in 0.9% sodium chloride 250 mL, overfill volume 25 mL IVPB     Admin Date  05/01/2020 Action  New Bag Dose  420 mg Rate  578 mL/hr Route  IntraVENous Administered By  Herbert Moses RN          saline peripheral flush soln 10 mL     Admin Date  05/01/2020 Action  Given Dose  10 mL Route  InterCATHeter Administered By  Herbert Moses RN          trastuzumab (HERCEPTIN) 450 mg in 0.9% sodium chloride 250 mL, overfill volume 25 mL IVPB     Admin Date  05/01/2020 Action  New Bag Dose  450 mg Rate  592.8 mL/hr Route  IntraVENous Administered By  Herbert Moses, PAGE                 Pt tolerated treatment well. At completion of treatment she reported mild nausea - released PRN zofran and administered. Port maintained positive blood return throughout treatment, flushed with positive blood return at conclusion, and de-accessed. Education provided to patient about Neulasta On Body Injector including: side effects, how/when to remove the device, as well as what to do in the event of device malfunction. Opportunity for questions was provided and all questions were answered. Patient verbalized understanding. 1530- D/c home in no distress.  Pt aware of next [facility] appointment scheduled for:    Future Appointments   Date Time Provider Viri Gonzales   5/22/2020  9:00 AM [facility] CHAIR 1 [facility]   5/22/2020  9:30 AM Lesly Reyes NP ONC ANIKA SCHED   6/12/2020  9:00 AM [place] FT CHAIR 1 [facility] REG   6/26/2020  9:15 [facility]    7/3/2020  9:00 AM CHAIR 3 [facility] REG   7/24/2020  9:00 AM [facility] CHAIR 2 [facility] REG   8/14/2020  9:00 AM CHAIR 3 [facility] REG

## 2020-05-01 NOTE — PROGRESS NOTES
[address], [address] [place] [address] Derek Samuel, 200 [facility]  [phone]      Oncology Progress note      Patient: Jean Spears MRN: [id number]  SSN: [id number]    YOB: 1975  Age: 53 y.o. Sex: female        Diagnosis:     1. Left breast carcinoma: Dx: 1/31/2020  T2 N1 M0 (Stage IIA) Invasive ductal carcinoma, Tumor size 2 cm, grade 3, ER 0%, WV 0%, Her 2 3+     Treatment:     1. Neoadjuvant chemotherapy   Docetaxel, Carboplatin, Trastuzumab + Pertuzumab   Cycle 4 day 1    Subjective:      Jean Spears is a 53 y.o. female who I am seeing in follow up for a new diagnosis of left sided invasive breast carcinoma. She felt a lump in her left breast. Dr. Navarro Brothers obtained a diagnostic mammogram. The mammogram showed an abnormality in the upper quadrant of the left breast. A biopsy of the lesion revealed a ER 0% WV 0% and Her 2 3+ Gr 3 IDC of the left breast.     Ms. Tamara Christianson returns today for her neoadjuvant treatment. Diarrhea is manageable with meds. Eating ok. Appetite is fair. She did have a follow up with Dr. Ludin Tierney on 4/29.        Review of Systems:    Constitutional: negative  Eyes: negative  Ears, Nose, Mouth, Throat, and Face: negative  Respiratory: negative  Cardiovascular: negative  Gastrointestinal: diarrhea  Genitourinary:negative  Integument/Breast: negative  Hematologic/Lymphatic: negative  Musculoskeletal:negative  Neurological: negative    Past Medical History:   Diagnosis Date    Anxiety and depression     Cancer ([address] 01/2020    left breast ca er/pr (-) her 2 (+)     Past Surgical History:   Procedure Laterality Date    HX BREAST BIOPSY Left 07/24/2018    Left sebaceous cyst surgically removed    HX CYST INCISION AND DRAINAGE Left 20+ yrs ago    HX HEENT      tonsillectomy    IR INSERT TUNL CVC W PORT OVER 5 YEARS  2/25/2020      Family History   Problem Relation Age of Onset    Hypertension Mother     Kidney Disease Mother     Thyroid Disease Mother      Social History     Tobacco Use    Smoking status: Never Smoker    Smokeless tobacco: Never Used   Substance Use Topics    Alcohol use: No      Prior to Admission medications    Medication Sig Start Date End Date Taking? Authorizing Provider   ethinyl estradiol-etonogestrel (NuvaRing) 0.12-0.015 mg/24 hr vaginal ring by Intravaginal route. Yes Provider, Historical   ondansetron (ZOFRAN ODT) 4 mg disintegrating tablet Take 1 Tab by mouth every eight (8) hours as needed for Nausea or Vomiting. 4/15/20  Yes Chico Marquez NP   prochlorperazine (COMPAZINE) 10 mg tablet Take 0.5 Tabs by mouth every six (6) hours as needed for Nausea for up to 30 doses. 2/11/20  Yes Mikayla Marquez NP   diphenoxylate-atropine (LOMOTIL) 2.5-0.025 mg per tablet Take 1 Tab by mouth four (4) times daily as needed for Diarrhea. Max Daily Amount: 4 Tabs. 2/11/20  Yes Mikayla Marquez NP   lidocaine-prilocaine (EMLA) topical cream Apply  to affected area as needed for Pain. 2/11/20  Yes Mikayla Marquez NP   dexAMETHasone (DECADRON) 4 mg tablet Take 8 mg by mouth two (2) times daily (with meals). The day of chemotherapy and the day after chemotherapy. 2/11/20  Yes Mikayla Marquez NP   VIIBRYD 40 mg tab tablet TAKE 1 TABLET BY MOUTH EVERY DAY 6/19/18  Yes Provider, Historical   ALPRAZolam (XANAX) 0.5 mg tablet 0.5 mg as needed. 1/21/16   Provider, Historical          No Known Allergies      Objective:     Vitals:    05/01/20 0938   BP: 120/72   Pulse: (!) 104   Resp: 18   Temp: 96.9 °F (36.1 °C)   SpO2: 99%   Weight: 172 lb (78 kg)   Height: 5' 6\" (1.676 m)      Pain Scale: 0/10      Physical Exam:    GENERAL: alert, cooperative, no distress, appears stated age  EYE: conjunctivae/corneas clear. PERRL, EOM's intact  LYMPHATIC: Cervical, supraclavicular, and axillary nodes normal.   THROAT & NECK: normal and no erythema or exudates noted.    LUNG: clear to auscultation bilaterally  HEART: regular rate and rhythm, S1, S2 normal, no murmur, click, rub or gallop  ABDOMEN: soft, non-tender. Bowel sounds normal. No masses,  no organomegaly  EXTREMITIES:  extremities normal, atraumatic, no cyanosis or edema  SKIN: Normal.  NEUROLOGIC: AOx3. Gait normal. Reflexes and motor strength normal and symmetric. Cranial nerves 2-12 and sensation grossly intact. Lab Results   Component Value Date/Time    WBC 4.3 05/01/2020 09:19 AM    HGB 10.3 (L) 05/01/2020 09:19 AM    HCT 30.5 (L) 05/01/2020 09:19 AM    PLATELET 110 33/59/0862 09:19 AM    MCV 95.6 05/01/2020 09:19 AM       Lab Results   Component Value Date/Time    Sodium 140 05/01/2020 09:19 AM    Potassium 3.9 05/01/2020 09:19 AM    Chloride 109 (H) 05/01/2020 09:19 AM    CO2 26 05/01/2020 09:19 AM    Anion gap 5 05/01/2020 09:19 AM    Glucose 96 05/01/2020 09:19 AM    BUN 20 05/01/2020 09:19 AM    Creatinine 0.87 05/01/2020 09:19 AM    BUN/Creatinine ratio 23 (H) 05/01/2020 09:19 AM    GFR est AA >60 05/01/2020 09:19 AM    GFR est non-AA >60 05/01/2020 09:19 AM    Calcium 8.8 05/01/2020 09:19 AM    Bilirubin, total 0.6 05/01/2020 09:19 AM    AST (SGOT) 45 (H) 05/01/2020 09:19 AM    Alk. phosphatase 97 05/01/2020 09:19 AM    Protein, total 6.5 05/01/2020 09:19 AM    Albumin 3.2 (L) 05/01/2020 09:19 AM    Globulin 3.3 05/01/2020 09:19 AM    A-G Ratio 1.0 (L) 05/01/2020 09:19 AM    ALT (SGPT) 68 05/01/2020 09:19 AM           Assessment:     1. Left breast carcinoma:  T2 N1 M0 (Stage IIA) Invasive ductal carcinoma, Tumor size 2 cm, grade 3, ER 0%, NE 0%, Her 2 3+        ECOG PS 0  Intent of Treatment - curative  Prognosis: excellent    Receiving neoadjuvant chemotherapy   Docetaxel, Carboplatin, Trastuzumab + Pertuzumab   Cycle 4 day 1    Tolerating treatment   A detailed system by system evaluation of side effect was performed to assess chemotherapy related toxicity. Blood counts are acceptable. Results reviewed with the patient.     She had a follow up with Dr. Kailash Diaz on 4/29. Lump is no longer palpable. US shows decrease in size of axillary LN. 2. Anemia from chemotherapy    Observation      3. CINV    Antiemetics      4. Diarrhea    Imodium + lomotil      Plan:       · Proceed with C#4 TCH-P (Trastuzumab 8mg/kg load with cycle 1 then 6mg/kg, Docetaxel 75mg/m2, Carboplatin AUC 6, Pertuzumab 840mg load with cycle 1 then 420mg) given every 3 weeks x 6 cycles  · Labs: CBC, CMP prior to each treatment. Troponin, proBNP prior to treatment and every 12 weeks  · Antiemetic Prophylaxis: Palonosetron and dexamethasone prior to chemo  · PRN Antiemetics: Ondansetron, Compazine  · Swelling prophylaxis: Dexmethasone 8mg bid the day before, day of and day after chemotherapy  · TTE prior to chemotherapy and every 12 weeks while on Trastuzumab  · Neulasta 24-72 hours after each treatment  · Return to clinic 3 weeks      I saw the patient in conjunction with SULAIMAN Carter. Signed by: Gris Wallace MD                     May 1, 2020        CC. Laurel Blackburn MD  CC.  Eyad Dwyer MD

## 2020-05-12 ENCOUNTER — HOSPITAL ENCOUNTER (OUTPATIENT)
Dept: NON INVASIVE DIAGNOSTICS | Age: 45
Discharge: HOME | End: 2020-05-12
Attending: INTERNAL MEDICINE
Payer: COMMERCIAL

## 2020-05-12 VITALS
WEIGHT: 171.96 LBS | SYSTOLIC BLOOD PRESSURE: 114 MMHG | HEIGHT: 66 IN | DIASTOLIC BLOOD PRESSURE: 68 MMHG | BODY MASS INDEX: 27.64 KG/M2

## 2020-05-12 DIAGNOSIS — C50.912 HER2-POSITIVE CARCINOMA OF LEFT BREAST (HCC): ICD-10-CM

## 2020-05-12 DIAGNOSIS — C50.912 MALIGNANT NEOPLASM OF LEFT FEMALE BREAST, UNSPECIFIED ESTROGEN RECEPTOR STATUS, UNSPECIFIED SITE OF BREAST (HCC): Primary | ICD-10-CM

## 2020-05-12 DIAGNOSIS — Z17.1 MALIGNANT NEOPLASM OF UPPER-OUTER QUADRANT OF LEFT BREAST IN FEMALE, ESTROGEN RECEPTOR NEGATIVE (HCC): ICD-10-CM

## 2020-05-12 DIAGNOSIS — C50.412 MALIGNANT NEOPLASM OF UPPER-OUTER QUADRANT OF LEFT BREAST IN FEMALE, ESTROGEN RECEPTOR NEGATIVE (HCC): ICD-10-CM

## 2020-05-12 DIAGNOSIS — C77.3 SECONDARY MALIGNANT NEOPLASM OF AXILLARY LYMPH NODES (HCC): ICD-10-CM

## 2020-05-12 LAB
AV VELOCITY RATIO: 0.84
ECHO AV AREA PEAK VELOCITY: 2.3 CM2
ECHO AV PEAK GRADIENT: 5 MMHG
ECHO AV PEAK VELOCITY: 111.85 CM/S
ECHO EST RA PRESSURE: 10 MMHG
ECHO LA AREA 4C: 13.2 CM2
ECHO LA VOL 4C: 32.01 ML (ref 22–52)
ECHO LA VOLUME INDEX A4C: 17.07 ML/M2 (ref 16–28)
ECHO LV E' LATERAL VELOCITY: 12.4 CM/S
ECHO LV E' SEPTAL VELOCITY: 11.43 CM/S
ECHO LV INTERNAL DIMENSION DIASTOLIC MMODE: 4.38 CM
ECHO LV INTERNAL DIMENSION SYSTOLIC MMODE: 3.23 CM
ECHO LV IVSD MMODE: 0.86 CM
ECHO LV IVSS MMODE: 1.12 CM
ECHO LV POSTERIOR WALL DIASTOLIC MMODE: 0.89 CM
ECHO LV POSTERIOR WALL SYSTOLIC MMODE: 1.44 CM
ECHO LVOT DIAM: 1.88 CM
ECHO LVOT PEAK GRADIENT: 3.5 MMHG
ECHO LVOT PEAK VELOCITY: 93.68 CM/S
ECHO MV A VELOCITY: 63.76 CM/S
ECHO MV E DECELERATION TIME (DT): 128.6 MS
ECHO MV E VELOCITY: 77.04 CM/S
ECHO MV E/A RATIO: 1.21
ECHO MV E/E' LATERAL: 6.21
ECHO MV E/E' RATIO (AVERAGED): 6.48
ECHO MV E/E' SEPTAL: 6.74
ECHO MV REGURGITANT PEAK GRADIENT: 6 MMHG
ECHO MV REGURGITANT PEAK VELOCITY: 122.27 CM/S
ECHO PULMONARY ARTERY SYSTOLIC PRESSURE (PASP): 22.5 MMHG
ECHO PV MAX VELOCITY: 69.15 CM/S
ECHO PV PEAK GRADIENT: 1.9 MMHG
ECHO RIGHT VENTRICULAR SYSTOLIC PRESSURE (RVSP): 22.5 MMHG
ECHO RV INTERNAL DIMENSION: 3.03 CM
ECHO RV TAPSE: 2.02 CM (ref 1.5–2)
ECHO TV A WAVE: 51.26 CM/S
ECHO TV E WAVE: 58.9 CM/S
ECHO TV EROA: 0.9
ECHO TV REGURGITANT MAX VELOCITY: 176.76 CM/S
ECHO TV REGURGITANT PEAK GRADIENT: 12.5 MMHG
LVFS: 26.32 %
MV DEC SLOPE: 5.99

## 2020-05-12 PROCEDURE — 93306 TTE W/DOPPLER COMPLETE: CPT

## 2020-05-15 RX ORDER — SODIUM CHLORIDE 9 MG/ML
10 INJECTION INTRAMUSCULAR; INTRAVENOUS; SUBCUTANEOUS AS NEEDED
Status: CANCELLED | OUTPATIENT
Start: 2020-06-12

## 2020-05-15 RX ORDER — SODIUM CHLORIDE 0.9 % (FLUSH) 0.9 %
10 SYRINGE (ML) INJECTION AS NEEDED
Status: CANCELLED
Start: 2020-06-12

## 2020-05-15 RX ORDER — HYDROCORTISONE SODIUM SUCCINATE 100 MG/2ML
100 INJECTION, POWDER, FOR SOLUTION INTRAMUSCULAR; INTRAVENOUS AS NEEDED
Status: CANCELLED | OUTPATIENT
Start: 2020-05-22

## 2020-05-15 RX ORDER — DIPHENHYDRAMINE HYDROCHLORIDE 50 MG/ML
25 INJECTION, SOLUTION INTRAMUSCULAR; INTRAVENOUS AS NEEDED
Status: CANCELLED
Start: 2020-05-22

## 2020-05-15 RX ORDER — ALBUTEROL SULFATE 0.83 MG/ML
2.5 SOLUTION RESPIRATORY (INHALATION) AS NEEDED
Status: CANCELLED
Start: 2020-05-22

## 2020-05-15 RX ORDER — ACETAMINOPHEN 325 MG/1
650 TABLET ORAL AS NEEDED
Status: CANCELLED
Start: 2020-05-22

## 2020-05-15 RX ORDER — EPINEPHRINE 1 MG/ML
0.3 INJECTION, SOLUTION, CONCENTRATE INTRAVENOUS AS NEEDED
Status: CANCELLED | OUTPATIENT
Start: 2020-06-12

## 2020-05-15 RX ORDER — DIPHENHYDRAMINE HYDROCHLORIDE 50 MG/ML
50 INJECTION, SOLUTION INTRAMUSCULAR; INTRAVENOUS AS NEEDED
Status: CANCELLED
Start: 2020-06-12

## 2020-05-15 RX ORDER — DIPHENHYDRAMINE HYDROCHLORIDE 50 MG/ML
50 INJECTION, SOLUTION INTRAMUSCULAR; INTRAVENOUS
Status: CANCELLED | OUTPATIENT
Start: 2020-06-12

## 2020-05-15 RX ORDER — ACETAMINOPHEN 325 MG/1
650 TABLET ORAL
Status: CANCELLED | OUTPATIENT
Start: 2020-05-22

## 2020-05-15 RX ORDER — ONDANSETRON 2 MG/ML
8 INJECTION INTRAMUSCULAR; INTRAVENOUS AS NEEDED
Status: CANCELLED | OUTPATIENT
Start: 2020-06-12

## 2020-05-15 RX ORDER — HEPARIN 100 UNIT/ML
300-500 SYRINGE INTRAVENOUS AS NEEDED
Status: CANCELLED
Start: 2020-06-12

## 2020-05-15 RX ORDER — EPINEPHRINE 1 MG/ML
0.3 INJECTION, SOLUTION, CONCENTRATE INTRAVENOUS AS NEEDED
Status: CANCELLED | OUTPATIENT
Start: 2020-05-22

## 2020-05-15 RX ORDER — DIPHENHYDRAMINE HYDROCHLORIDE 50 MG/ML
50 INJECTION, SOLUTION INTRAMUSCULAR; INTRAVENOUS AS NEEDED
Status: CANCELLED
Start: 2020-05-22

## 2020-05-15 RX ORDER — HYDROCORTISONE SODIUM SUCCINATE 100 MG/2ML
100 INJECTION, POWDER, FOR SOLUTION INTRAMUSCULAR; INTRAVENOUS AS NEEDED
Status: CANCELLED | OUTPATIENT
Start: 2020-06-12

## 2020-05-15 RX ORDER — SODIUM CHLORIDE 9 MG/ML
25 INJECTION, SOLUTION INTRAVENOUS CONTINUOUS
Status: CANCELLED | OUTPATIENT
Start: 2020-06-12 | End: 2020-06-12

## 2020-05-15 RX ORDER — DIPHENHYDRAMINE HYDROCHLORIDE 50 MG/ML
25 INJECTION, SOLUTION INTRAMUSCULAR; INTRAVENOUS AS NEEDED
Status: CANCELLED
Start: 2020-06-12

## 2020-05-15 RX ORDER — ONDANSETRON 2 MG/ML
8 INJECTION INTRAMUSCULAR; INTRAVENOUS AS NEEDED
Status: CANCELLED | OUTPATIENT
Start: 2020-05-22

## 2020-05-15 RX ORDER — DIPHENHYDRAMINE HYDROCHLORIDE 50 MG/ML
50 INJECTION, SOLUTION INTRAMUSCULAR; INTRAVENOUS
Status: CANCELLED | OUTPATIENT
Start: 2020-05-22

## 2020-05-15 RX ORDER — DEXAMETHASONE SODIUM PHOSPHATE 100 MG/10ML
10 INJECTION INTRAMUSCULAR; INTRAVENOUS ONCE
Status: CANCELLED | OUTPATIENT
Start: 2020-06-12

## 2020-05-15 RX ORDER — ACETAMINOPHEN 325 MG/1
650 TABLET ORAL AS NEEDED
Status: CANCELLED
Start: 2020-06-12

## 2020-05-15 RX ORDER — ALBUTEROL SULFATE 0.83 MG/ML
2.5 SOLUTION RESPIRATORY (INHALATION) AS NEEDED
Status: CANCELLED
Start: 2020-06-12

## 2020-05-15 RX ORDER — PALONOSETRON 0.05 MG/ML
0.25 INJECTION, SOLUTION INTRAVENOUS ONCE
Status: CANCELLED | OUTPATIENT
Start: 2020-06-12

## 2020-05-15 RX ORDER — ACETAMINOPHEN 325 MG/1
650 TABLET ORAL
Status: CANCELLED | OUTPATIENT
Start: 2020-06-12

## 2020-05-21 NOTE — PROGRESS NOTES
[address], [address] [place] [address] Derek Rodriguezineau, 200 [facility]  [phone]      Oncology Progress note      Patient: Mirella Oneil MRN: [id number]  SSN: [id number]    YOB: 1975  Age: 53 y.o. Sex: female        Diagnosis:     1. Left breast carcinoma: Dx: 1/31/2020  T2 N1 M0 (Stage IIA) Invasive ductal carcinoma, Tumor size 2 cm, grade 3, ER 0%, OH 0%, Her 2 3+     Treatment:     1. Neoadjuvant chemotherapy   Docetaxel, Carboplatin, Trastuzumab + Pertuzumab   Cycle 5 day 1    Subjective:      Mirella Oneil is a 53 y.o. female who I am seeing in follow up for a new diagnosis of left sided invasive breast carcinoma. She felt a lump in her left breast. Dr. Bettina Gregg obtained a diagnostic mammogram. The mammogram showed an abnormality in the upper quadrant of the left breast. A biopsy of the lesion revealed a ER 0% OH 0% and Her 2 3+ Gr 3 IDC of the left breast.     Ms. Freya Lewis returns today for her neoadjuvant treatment. Diarrhea is manageable with meds. Eating ok. Appetite is fair. She did have a follow up with Dr. Demarcus Elias on 4/29.        Review of Systems:    Constitutional: negative  Eyes: negative  Ears, Nose, Mouth, Throat, and Face: negative  Respiratory: negative  Cardiovascular: negative  Gastrointestinal: diarrhea  Genitourinary:negative  Integument/Breast: negative  Hematologic/Lymphatic: negative  Musculoskeletal:negative  Neurological: negative    Past Medical History:   Diagnosis Date    Anxiety and depression     Cancer ([address] 01/2020    left breast ca er/pr (-) her 2 (+)     Past Surgical History:   Procedure Laterality Date    HX BREAST BIOPSY Left 07/24/2018    Left sebaceous cyst surgically removed    HX CYST INCISION AND DRAINAGE Left 20+ yrs ago    HX HEENT      tonsillectomy    IR INSERT TUNL CVC W PORT OVER 5 YEARS  2/25/2020      Family History   Problem Relation Age of Onset    Hypertension Mother     Kidney Disease Mother     Thyroid Disease Mother      Social History     Tobacco Use    Smoking status: Never Smoker    Smokeless tobacco: Never Used   Substance Use Topics    Alcohol use: No      Prior to Admission medications    Medication Sig Start Date End Date Taking? Authorizing Provider   ethinyl estradiol-etonogestrel (NuvaRing) 0.12-0.015 mg/24 hr vaginal ring by Intravaginal route. Yes Provider, Historical   ondansetron (ZOFRAN ODT) 4 mg disintegrating tablet Take 1 Tab by mouth every eight (8) hours as needed for Nausea or Vomiting. 4/15/20  Yes Yohana Marquez NP   prochlorperazine (COMPAZINE) 10 mg tablet Take 0.5 Tabs by mouth every six (6) hours as needed for Nausea for up to 30 doses. 2/11/20  Yes Mikayla Marquez NP   diphenoxylate-atropine (LOMOTIL) 2.5-0.025 mg per tablet Take 1 Tab by mouth four (4) times daily as needed for Diarrhea. Max Daily Amount: 4 Tabs. 2/11/20  Yes Mikayla Marquez NP   lidocaine-prilocaine (EMLA) topical cream Apply  to affected area as needed for Pain. 2/11/20  Yes Mikayla Marquez NP   dexAMETHasone (DECADRON) 4 mg tablet Take 8 mg by mouth two (2) times daily (with meals). The day of chemotherapy and the day after chemotherapy. 2/11/20  Yes Mikayla Marquez NP   VIIBRYD 40 mg tab tablet TAKE 1 TABLET BY MOUTH EVERY DAY 6/19/18  Yes Provider, Historical          No Known Allergies      Objective:     Vitals:    05/22/20 0911   BP: 124/77   Pulse: 87   Resp: 18   Temp: 97.2 °F (36.2 °C)   SpO2: 99%   Weight: 172 lb (78 kg)   Height: 5' 6\" (1.676 m)      Pain Scale: 0/10      Physical Exam:    GENERAL: alert, cooperative, no distress, appears stated age  EYE: conjunctivae/corneas clear. PERRL, EOM's intact  LYMPHATIC: Cervical, supraclavicular, and axillary nodes normal.   THROAT & NECK: normal and no erythema or exudates noted.    LUNG: clear to auscultation bilaterally  HEART: regular rate and rhythm, S1, S2 normal, no murmur, click, rub or gallop  ABDOMEN: soft, non-tender. Bowel sounds normal. No masses,  no organomegaly  EXTREMITIES:  extremities normal, atraumatic, no cyanosis or edema  SKIN: Normal.  NEUROLOGIC: AOx3. Gait normal. Reflexes and motor strength normal and symmetric. Cranial nerves 2-12 and sensation grossly intact. Lab Results   Component Value Date/Time    WBC 4.8 05/22/2020 09:10 AM    HGB 10.9 (L) 05/22/2020 09:10 AM    HCT 32.2 (L) 05/22/2020 09:10 AM    PLATELET 518 69/79/6335 09:10 AM    MCV 97.3 05/22/2020 09:10 AM       Lab Results   Component Value Date/Time    Sodium 138 05/22/2020 09:10 AM    Potassium 4.2 05/22/2020 09:10 AM    Chloride 107 05/22/2020 09:10 AM    CO2 25 05/22/2020 09:10 AM    Anion gap 6 05/22/2020 09:10 AM    Glucose 103 (H) 05/22/2020 09:10 AM    BUN 18 05/22/2020 09:10 AM    Creatinine 1.08 (H) 05/22/2020 09:10 AM    BUN/Creatinine ratio 17 05/22/2020 09:10 AM    GFR est AA >60 05/22/2020 09:10 AM    GFR est non-AA 55 (L) 05/22/2020 09:10 AM    Calcium 8.7 05/22/2020 09:10 AM    Bilirubin, total 0.3 05/22/2020 09:10 AM    AST (SGOT) 26 05/22/2020 09:10 AM    Alk. phosphatase 101 05/22/2020 09:10 AM    Protein, total 6.8 05/22/2020 09:10 AM    Albumin 3.2 (L) 05/22/2020 09:10 AM    Globulin 3.6 05/22/2020 09:10 AM    A-G Ratio 0.9 (L) 05/22/2020 09:10 AM    ALT (SGPT) 38 05/22/2020 09:10 AM           Assessment:     1. Left breast carcinoma:  T2 N1 M0 (Stage IIA) Invasive ductal carcinoma, Tumor size 2 cm, grade 3, ER 0%, NM 0%, Her 2 3+        ECOG PS 0  Intent of Treatment - curative  Prognosis: excellent    LVEF (5/12/2020) - 50-55%    Receiving neoadjuvant chemotherapy   Docetaxel, Carboplatin, Trastuzumab + Pertuzumab   Cycle 5 day 1    Tolerating treatment   A detailed system by system evaluation of side effect was performed to assess chemotherapy related toxicity. Blood counts are acceptable. Results reviewed with the patient. She had a follow up with Dr. Jeffrey Alba on 4/29. Lump is no longer palpable.  [address] shows decrease in size of axillary LN. 2. Anemia from chemotherapy    Observation      3. CINV    Antiemetics      4. Diarrhea    Imodium + lomotil      Plan:       · Proceed with C#5 TCH-P (Trastuzumab 8mg/kg load with cycle 1 then 6mg/kg, Docetaxel 75mg/m2, Carboplatin AUC 6, Pertuzumab 840mg load with cycle 1 then 420mg) given every 3 weeks x 6 cycles  · Labs: CBC, CMP prior to each treatment. Troponin, proBNP prior to treatment and every 12 weeks  · Antiemetic Prophylaxis: Palonosetron and dexamethasone prior to chemo  · PRN Antiemetics: Ondansetron, Compazine  · Swelling prophylaxis: Dexmethasone 8mg bid the day before, day of and day after chemotherapy  · TTE prior to chemotherapy and every 12 weeks while on Trastuzumab  · Neulasta 24-72 hours after each treatment  · Return to clinic 3 weeks      I saw the patient in conjunction with SULAIMAN Guardado. Signed by: Arlet Pena MD                     May 22, 2020        CC. William Padilla MD  CC.  Cholo Clements MD

## 2020-05-22 ENCOUNTER — OFFICE VISIT (OUTPATIENT)
Dept: ONCOLOGY | Age: 45
End: 2020-05-22

## 2020-05-22 ENCOUNTER — HOSPITAL ENCOUNTER (OUTPATIENT)
Dept: INFUSION THERAPY | Age: 45
Discharge: HOME | End: 2020-05-22
Payer: COMMERCIAL

## 2020-05-22 VITALS
BODY MASS INDEX: 27.64 KG/M2 | HEART RATE: 87 BPM | SYSTOLIC BLOOD PRESSURE: 124 MMHG | TEMPERATURE: 97.2 F | OXYGEN SATURATION: 99 % | WEIGHT: 172 LBS | DIASTOLIC BLOOD PRESSURE: 77 MMHG | RESPIRATION RATE: 18 BRPM | HEIGHT: 66 IN

## 2020-05-22 VITALS
RESPIRATION RATE: 18 BRPM | OXYGEN SATURATION: 99 % | BODY MASS INDEX: 27.68 KG/M2 | HEART RATE: 89 BPM | DIASTOLIC BLOOD PRESSURE: 65 MMHG | SYSTOLIC BLOOD PRESSURE: 119 MMHG | TEMPERATURE: 97.2 F | HEIGHT: 66 IN | WEIGHT: 172.2 LBS

## 2020-05-22 DIAGNOSIS — Z17.1 MALIGNANT NEOPLASM OF LEFT BREAST IN FEMALE, ESTROGEN RECEPTOR NEGATIVE, UNSPECIFIED SITE OF BREAST (HCC): Primary | ICD-10-CM

## 2020-05-22 DIAGNOSIS — C50.912 HER2-POSITIVE CARCINOMA OF LEFT BREAST (HCC): ICD-10-CM

## 2020-05-22 DIAGNOSIS — R19.7 DIARRHEA, UNSPECIFIED TYPE: ICD-10-CM

## 2020-05-22 DIAGNOSIS — Z09 CHEMOTHERAPY FOLLOW-UP EXAMINATION: ICD-10-CM

## 2020-05-22 DIAGNOSIS — C50.412 MALIGNANT NEOPLASM OF UPPER-OUTER QUADRANT OF LEFT BREAST IN FEMALE, ESTROGEN RECEPTOR NEGATIVE (HCC): Primary | ICD-10-CM

## 2020-05-22 DIAGNOSIS — C50.912 MALIGNANT NEOPLASM OF LEFT BREAST IN FEMALE, ESTROGEN RECEPTOR NEGATIVE, UNSPECIFIED SITE OF BREAST (HCC): Primary | ICD-10-CM

## 2020-05-22 DIAGNOSIS — T45.1X5A ANTINEOPLASTIC CHEMOTHERAPY INDUCED ANEMIA: ICD-10-CM

## 2020-05-22 DIAGNOSIS — D64.81 ANTINEOPLASTIC CHEMOTHERAPY INDUCED ANEMIA: ICD-10-CM

## 2020-05-22 DIAGNOSIS — Z17.1 MALIGNANT NEOPLASM OF UPPER-OUTER QUADRANT OF LEFT BREAST IN FEMALE, ESTROGEN RECEPTOR NEGATIVE (HCC): Primary | ICD-10-CM

## 2020-05-22 LAB
ALBUMIN SERPL-MCNC: 3.2 G/DL (ref 3.5–5)
ALBUMIN/GLOB SERPL: 0.9 {RATIO} (ref 1.1–2.2)
ALP SERPL-CCNC: 101 U/L (ref 45–117)
ALT SERPL-CCNC: 38 U/L (ref 12–78)
ANION GAP SERPL CALC-SCNC: 6 MMOL/L (ref 5–15)
AST SERPL-CCNC: 26 U/L (ref 15–37)
BASO+EOS+MONOS # BLD AUTO: 0.2 K/UL (ref 0.2–1.2)
BASO+EOS+MONOS NFR BLD AUTO: 3 % (ref 3.2–16.9)
BILIRUB SERPL-MCNC: 0.3 MG/DL (ref 0.2–1)
BUN SERPL-MCNC: 18 MG/DL (ref 6–20)
BUN/CREAT SERPL: 17 (ref 12–20)
CALCIUM SERPL-MCNC: 8.7 MG/DL (ref 8.5–10.1)
CHLORIDE SERPL-SCNC: 107 MMOL/L (ref 97–108)
CO2 SERPL-SCNC: 25 MMOL/L (ref 21–32)
CREAT SERPL-MCNC: 1.08 MG/DL (ref 0.55–1.02)
DIFFERENTIAL METHOD BLD: ABNORMAL
ERYTHROCYTE [DISTWIDTH] IN BLOOD BY AUTOMATED COUNT: 16.9 % (ref 11.8–15.8)
GLOBULIN SER CALC-MCNC: 3.6 G/DL (ref 2–4)
GLUCOSE SERPL-MCNC: 103 MG/DL (ref 65–100)
HCT VFR BLD AUTO: 32.2 % (ref 35–47)
HGB BLD-MCNC: 10.9 G/DL (ref 11.5–16)
LYMPHOCYTES # BLD: 0.6 K/UL (ref 0.8–3.5)
LYMPHOCYTES NFR BLD: 13 % (ref 12–49)
MCH RBC QN AUTO: 32.9 PG (ref 26–34)
MCHC RBC AUTO-ENTMCNC: 33.9 G/DL (ref 30–36.5)
MCV RBC AUTO: 97.3 FL (ref 80–99)
NEUTS SEG # BLD: 4 K/UL (ref 1.8–8)
NEUTS SEG NFR BLD: 84 % (ref 32–75)
PLATELET # BLD AUTO: 287 K/UL (ref 150–400)
POTASSIUM SERPL-SCNC: 4.2 MMOL/L (ref 3.5–5.1)
PROT SERPL-MCNC: 6.8 G/DL (ref 6.4–8.2)
RBC # BLD AUTO: 3.31 M/UL (ref 3.8–5.2)
SODIUM SERPL-SCNC: 138 MMOL/L (ref 136–145)
WBC # BLD AUTO: 4.8 K/UL (ref 3.6–11)

## 2020-05-22 PROCEDURE — 96413 CHEMO IV INFUSION 1 HR: CPT

## 2020-05-22 PROCEDURE — 80053 COMPREHEN METABOLIC PANEL: CPT

## 2020-05-22 PROCEDURE — 96375 TX/PRO/DX INJ NEW DRUG ADDON: CPT

## 2020-05-22 PROCEDURE — 74011250636 HC RX REV CODE- 250/636: Performed by: INTERNAL MEDICINE

## 2020-05-22 PROCEDURE — 77030012965 HC NDL HUBR BBMI -A

## 2020-05-22 PROCEDURE — 74011000250 HC RX REV CODE- 250: Performed by: INTERNAL MEDICINE

## 2020-05-22 PROCEDURE — 36415 COLL VENOUS BLD VENIPUNCTURE: CPT

## 2020-05-22 PROCEDURE — 96367 TX/PROPH/DG ADDL SEQ IV INF: CPT

## 2020-05-22 PROCEDURE — 85025 COMPLETE CBC W/AUTO DIFF WBC: CPT

## 2020-05-22 PROCEDURE — 74011000258 HC RX REV CODE- 258: Performed by: INTERNAL MEDICINE

## 2020-05-22 PROCEDURE — 96417 CHEMO IV INFUS EACH ADDL SEQ: CPT

## 2020-05-22 PROCEDURE — 96377 APPLICATON ON-BODY INJECTOR: CPT

## 2020-05-22 RX ORDER — SODIUM CHLORIDE 9 MG/ML
25 INJECTION, SOLUTION INTRAVENOUS CONTINUOUS
Status: DISPENSED | OUTPATIENT
Start: 2020-05-22 | End: 2020-05-22

## 2020-05-22 RX ORDER — PALONOSETRON 0.05 MG/ML
0.25 INJECTION, SOLUTION INTRAVENOUS ONCE
Status: COMPLETED | OUTPATIENT
Start: 2020-05-22 | End: 2020-05-22

## 2020-05-22 RX ORDER — HEPARIN 100 UNIT/ML
300-500 SYRINGE INTRAVENOUS AS NEEDED
Status: ACTIVE | OUTPATIENT
Start: 2020-05-22 | End: 2020-05-22

## 2020-05-22 RX ORDER — DEXAMETHASONE SODIUM PHOSPHATE 100 MG/10ML
10 INJECTION INTRAMUSCULAR; INTRAVENOUS ONCE
Status: DISCONTINUED | OUTPATIENT
Start: 2020-05-22 | End: 2020-05-22

## 2020-05-22 RX ORDER — SODIUM CHLORIDE 0.9 % (FLUSH) 0.9 %
10 SYRINGE (ML) INJECTION AS NEEDED
Status: DISPENSED | OUTPATIENT
Start: 2020-05-22 | End: 2020-05-22

## 2020-05-22 RX ORDER — SODIUM CHLORIDE 9 MG/ML
10 INJECTION INTRAMUSCULAR; INTRAVENOUS; SUBCUTANEOUS AS NEEDED
Status: DISPENSED | OUTPATIENT
Start: 2020-05-22 | End: 2020-05-22

## 2020-05-22 RX ADMIN — CARBOPLATIN 700 MG: 10 INJECTION, SOLUTION INTRAVENOUS at 14:47

## 2020-05-22 RX ADMIN — Medication 10 ML: at 15:25

## 2020-05-22 RX ADMIN — Medication 10 ML: at 09:18

## 2020-05-22 RX ADMIN — PALONOSETRON 0.25 MG: 0.25 INJECTION, SOLUTION INTRAVENOUS at 11:34

## 2020-05-22 RX ADMIN — SODIUM CHLORIDE 150 MG: 900 INJECTION, SOLUTION INTRAVENOUS at 11:37

## 2020-05-22 RX ADMIN — DEXAMETHASONE SODIUM PHOSPHATE 12 MG: 4 INJECTION, SOLUTION INTRA-ARTICULAR; INTRALESIONAL; INTRAMUSCULAR; INTRAVENOUS; SOFT TISSUE at 11:03

## 2020-05-22 RX ADMIN — PERTUZUMAB 420 MG: 30 INJECTION, SOLUTION, CONCENTRATE INTRAVENOUS at 12:46

## 2020-05-22 RX ADMIN — Medication 500 UNITS: at 15:25

## 2020-05-22 RX ADMIN — DOCETAXEL ANHYDROUS 142 MG: 10 INJECTION, SOLUTION INTRAVENOUS at 13:40

## 2020-05-22 RX ADMIN — TRASTUZUMAB 450 MG: 150 INJECTION, POWDER, LYOPHILIZED, FOR SOLUTION INTRAVENOUS at 12:10

## 2020-05-22 RX ADMIN — SODIUM CHLORIDE 10 ML: 9 INJECTION, SOLUTION INTRAMUSCULAR; INTRAVENOUS; SUBCUTANEOUS at 09:18

## 2020-05-22 RX ADMIN — Medication 10 ML: at 14:47

## 2020-05-22 RX ADMIN — SODIUM CHLORIDE 25 ML/HR: 900 INJECTION, SOLUTION INTRAVENOUS at 11:03

## 2020-05-22 RX ADMIN — PEGFILGRASTIM 6 MG: KIT SUBCUTANEOUS at 15:26

## 2020-05-22 NOTE — PROGRESS NOTES
Pt arrived to [facility] ambulatory for [facility] C5 in no acute distress at 0905.  Assessment unremarkable except diarrhea. R chest port accessed without issue and positive blood return noted.  Labs obtained- CBCap and CMP. Visit Vitals  /77 (BP 1 Location: Right arm, BP Patient Position: Sitting)   Pulse 87   Temp 97.2 °F (36.2 °C)   Resp 18   Ht 5' 6\" (1.676 m)   Wt 78.1 kg (172 lb 3.2 oz)   SpO2 99%   BMI 27.79 kg/m²     Recent Results (from the past 12 hour(s))   METABOLIC PANEL, COMPREHENSIVE    Collection Time: 05/22/20  9:10 AM   Result Value Ref Range    Sodium 138 136 - 145 mmol/L    Potassium 4.2 3.5 - 5.1 mmol/L    Chloride 107 97 - 108 mmol/L    CO2 25 21 - 32 mmol/L    Anion gap 6 5 - 15 mmol/L    Glucose 103 (H) 65 - 100 mg/dL    BUN 18 6 - 20 MG/DL    Creatinine 1.08 (H) 0.55 - 1.02 MG/DL    BUN/Creatinine ratio 17 12 - 20      GFR est AA >60 >60 ml/min/1.73m2    GFR est non-AA 55 (L) >60 ml/min/1.73m2    Calcium 8.7 8.5 - 10.1 MG/DL    Bilirubin, total 0.3 0.2 - 1.0 MG/DL    ALT (SGPT) 38 12 - 78 U/L    AST (SGOT) 26 15 - 37 U/L    Alk. phosphatase 101 45 - 117 U/L    Protein, total 6.8 6.4 - 8.2 g/dL    Albumin 3.2 (L) 3.5 - 5.0 g/dL    Globulin 3.6 2.0 - 4.0 g/dL    A-G Ratio 0.9 (L) 1.1 - 2.2     CBC WITH 3 PART DIFF    Collection Time: 05/22/20  9:10 AM   Result Value Ref Range    WBC 4.8 3.6 - 11.0 K/uL    RBC 3.31 (L) 3.80 - 5.20 M/uL    HGB 10.9 (L) 11.5 - 16.0 g/dL    HCT 32.2 (L) 35.0 - 47.0 %    MCV 97.3 80.0 - 99.0 FL    MCH 32.9 26.0 - 34.0 PG    MCHC 33.9 30.0 - 36.5 g/dL    RDW 16.9 (H) 11.8 - 15.8 %    PLATELET 102 559 - 788 K/uL    NEUTROPHILS 84 (H) 32 - 75 %    MIXED CELLS 3 (L) 3.2 - 16.9 %    LYMPHOCYTES 13 12 - 49 %    ABS. NEUTROPHILS 4.0 1.8 - 8.0 K/UL    ABS. MIXED CELLS 0.2 0.2 - 1.2 K/uL    ABS.  LYMPHOCYTES 0.6 (L) 0.8 - 3.5 K/UL    DF AUTOMATED         The following medications administered:  NS @ KVO  Decadron 12 mg IV over 15 minutes  Aloxi 0.25 mg IVP  Emend 150 mg IV over 20 minutes  Herceptin 450 mg IV over 30 minutes  Perjeta 420 mg IV over 30 minutes  Docetaxel 142 mg IV over 1 hour  Carboplatin 700 mg IV over 30 minutes  Neulasta 6 mg OBI SQ applied to left arm    Visit Vitals  /65 (BP 1 Location: Right arm, BP Patient Position: Sitting)   Pulse 89   Temp 97.2 °F (36.2 °C)   Resp 18   Ht 5' 6\" (1.676 m)   Wt 78.1 kg (172 lb 3.2 oz)   SpO2 99%   BMI 27.79 kg/m²       Pt tolerated treatment well.  No adverse reaction noted. Pt understands when to remove OBI device. Port flushed per policy and needle removed, 2x2 and paper tape placed.  Pt discharged ambulatory in no acute distress at 1605, accompanied by self. Next appointment 6/12/20 @ 0900.

## 2020-05-22 NOTE — LETTER
5/22/20 Patient: Jose Hernández YOB: 1975 Date of Visit: 5/22/2020 Leonora Lima MD 
[address] [facility] [address] [address] VIA Facsimile: [phone] Dear Leonora Lima MD, Thank you for referring Ms. Jeannie Larios to [address] [place] [address] for evaluation. My notes for this consultation are attached. If you have questions, please do not hesitate to call me. I look forward to following your patient along with you.  
 
 
Sincerely, 
 
Alisa Rowan NP

## 2020-05-22 NOTE — PROGRESS NOTES
Shiv Bond is a 53 y.o. female cauc female here for follow up for:  Chief Complaint   Patient presents with    Breast Cancer     left    Chemotherapy   Cycle 5 Day 1 TCH&P    1. Have you been to the ER, urgent care clinic since your last visit? Hospitalized since your last visit? No    2. Have you seen or consulted any other health care providers outside of the [address] [place] [address] since your last visit? Include any pap smears or colon screening. No    Pt states everything has been ok since last visit. No concerns brought up.

## 2020-06-12 ENCOUNTER — HOSPITAL ENCOUNTER (OUTPATIENT)
Dept: INFUSION THERAPY | Age: 45
Discharge: HOME | End: 2020-06-12
Payer: COMMERCIAL

## 2020-06-12 ENCOUNTER — OFFICE VISIT (OUTPATIENT)
Dept: ONCOLOGY | Age: 45
End: 2020-06-12

## 2020-06-12 VITALS
RESPIRATION RATE: 16 BRPM | HEART RATE: 96 BPM | BODY MASS INDEX: 27.76 KG/M2 | SYSTOLIC BLOOD PRESSURE: 115 MMHG | WEIGHT: 172.7 LBS | DIASTOLIC BLOOD PRESSURE: 69 MMHG | TEMPERATURE: 97.8 F | HEIGHT: 66 IN

## 2020-06-12 VITALS
RESPIRATION RATE: 16 BRPM | SYSTOLIC BLOOD PRESSURE: 128 MMHG | WEIGHT: 172 LBS | DIASTOLIC BLOOD PRESSURE: 70 MMHG | BODY MASS INDEX: 27.64 KG/M2 | TEMPERATURE: 97.8 F | HEIGHT: 66 IN | HEART RATE: 99 BPM

## 2020-06-12 DIAGNOSIS — T45.1X5A ANTINEOPLASTIC CHEMOTHERAPY INDUCED ANEMIA: ICD-10-CM

## 2020-06-12 DIAGNOSIS — Z17.1 MALIGNANT NEOPLASM OF LEFT BREAST IN FEMALE, ESTROGEN RECEPTOR NEGATIVE, UNSPECIFIED SITE OF BREAST (HCC): Primary | ICD-10-CM

## 2020-06-12 DIAGNOSIS — C50.412 MALIGNANT NEOPLASM OF UPPER-OUTER QUADRANT OF LEFT BREAST IN FEMALE, ESTROGEN RECEPTOR NEGATIVE (HCC): Primary | ICD-10-CM

## 2020-06-12 DIAGNOSIS — R19.7 DIARRHEA, UNSPECIFIED TYPE: ICD-10-CM

## 2020-06-12 DIAGNOSIS — D64.81 ANTINEOPLASTIC CHEMOTHERAPY INDUCED ANEMIA: ICD-10-CM

## 2020-06-12 DIAGNOSIS — C50.912 MALIGNANT NEOPLASM OF LEFT BREAST IN FEMALE, ESTROGEN RECEPTOR NEGATIVE, UNSPECIFIED SITE OF BREAST (HCC): Primary | ICD-10-CM

## 2020-06-12 DIAGNOSIS — Z09 CHEMOTHERAPY FOLLOW-UP EXAMINATION: ICD-10-CM

## 2020-06-12 DIAGNOSIS — C50.912 HER2-POSITIVE CARCINOMA OF LEFT BREAST (HCC): ICD-10-CM

## 2020-06-12 DIAGNOSIS — Z17.1 MALIGNANT NEOPLASM OF UPPER-OUTER QUADRANT OF LEFT BREAST IN FEMALE, ESTROGEN RECEPTOR NEGATIVE (HCC): Primary | ICD-10-CM

## 2020-06-12 LAB
ALBUMIN SERPL-MCNC: 2.9 G/DL (ref 3.5–5)
ALBUMIN/GLOB SERPL: 0.9 {RATIO} (ref 1.1–2.2)
ALP SERPL-CCNC: 101 U/L (ref 45–117)
ALT SERPL-CCNC: 49 U/L (ref 12–78)
ANION GAP SERPL CALC-SCNC: 7 MMOL/L (ref 5–15)
AST SERPL-CCNC: 32 U/L (ref 15–37)
BASOPHILS # BLD: 0 K/UL (ref 0–0.1)
BASOPHILS NFR BLD: 1 % (ref 0–1)
BILIRUB SERPL-MCNC: 0.4 MG/DL (ref 0.2–1)
BUN SERPL-MCNC: 17 MG/DL (ref 6–20)
BUN/CREAT SERPL: 19 (ref 12–20)
CALCIUM SERPL-MCNC: 8.3 MG/DL (ref 8.5–10.1)
CHLORIDE SERPL-SCNC: 110 MMOL/L (ref 97–108)
CO2 SERPL-SCNC: 25 MMOL/L (ref 21–32)
CREAT SERPL-MCNC: 0.88 MG/DL (ref 0.55–1.02)
DIFFERENTIAL METHOD BLD: ABNORMAL
EOSINOPHIL # BLD: 0 K/UL (ref 0–0.4)
EOSINOPHIL NFR BLD: 0 % (ref 0–7)
ERYTHROCYTE [DISTWIDTH] IN BLOOD BY AUTOMATED COUNT: 15.2 % (ref 11.5–14.5)
GLOBULIN SER CALC-MCNC: 3.2 G/DL (ref 2–4)
GLUCOSE SERPL-MCNC: 103 MG/DL (ref 65–100)
HCT VFR BLD AUTO: 30.7 % (ref 35–47)
HGB BLD-MCNC: 10.4 G/DL (ref 11.5–16)
IMM GRANULOCYTES # BLD AUTO: 0 K/UL (ref 0–0.04)
IMM GRANULOCYTES NFR BLD AUTO: 1 % (ref 0–0.5)
LYMPHOCYTES # BLD: 0.9 K/UL (ref 0.8–3.5)
LYMPHOCYTES NFR BLD: 24 % (ref 12–49)
MCH RBC QN AUTO: 33.7 PG (ref 26–34)
MCHC RBC AUTO-ENTMCNC: 33.9 G/DL (ref 30–36.5)
MCV RBC AUTO: 99.4 FL (ref 80–99)
MONOCYTES # BLD: 0.4 K/UL (ref 0–1)
MONOCYTES NFR BLD: 10 % (ref 5–13)
NEUTS SEG # BLD: 2.4 K/UL (ref 1.8–8)
NEUTS SEG NFR BLD: 64 % (ref 32–75)
NRBC # BLD: 0 K/UL (ref 0–0.01)
NRBC BLD-RTO: 0 PER 100 WBC
PLATELET # BLD AUTO: 245 K/UL (ref 150–400)
PMV BLD AUTO: 10.1 FL (ref 8.9–12.9)
POTASSIUM SERPL-SCNC: 3.5 MMOL/L (ref 3.5–5.1)
PROT SERPL-MCNC: 6.1 G/DL (ref 6.4–8.2)
RBC # BLD AUTO: 3.09 M/UL (ref 3.8–5.2)
SODIUM SERPL-SCNC: 142 MMOL/L (ref 136–145)
WBC # BLD AUTO: 3.7 K/UL (ref 3.6–11)

## 2020-06-12 PROCEDURE — 74011000258 HC RX REV CODE- 258: Performed by: INTERNAL MEDICINE

## 2020-06-12 PROCEDURE — 85025 COMPLETE CBC W/AUTO DIFF WBC: CPT

## 2020-06-12 PROCEDURE — 77030012965 HC NDL HUBR BBMI -A

## 2020-06-12 PROCEDURE — 74011000250 HC RX REV CODE- 250: Performed by: INTERNAL MEDICINE

## 2020-06-12 PROCEDURE — 74011250636 HC RX REV CODE- 250/636: Performed by: INTERNAL MEDICINE

## 2020-06-12 PROCEDURE — 96417 CHEMO IV INFUS EACH ADDL SEQ: CPT

## 2020-06-12 PROCEDURE — 80053 COMPREHEN METABOLIC PANEL: CPT

## 2020-06-12 PROCEDURE — 96367 TX/PROPH/DG ADDL SEQ IV INF: CPT

## 2020-06-12 PROCEDURE — 96377 APPLICATON ON-BODY INJECTOR: CPT

## 2020-06-12 PROCEDURE — 96413 CHEMO IV INFUSION 1 HR: CPT

## 2020-06-12 PROCEDURE — 96375 TX/PRO/DX INJ NEW DRUG ADDON: CPT

## 2020-06-12 PROCEDURE — 36415 COLL VENOUS BLD VENIPUNCTURE: CPT

## 2020-06-12 RX ORDER — SODIUM CHLORIDE 0.9 % (FLUSH) 0.9 %
10 SYRINGE (ML) INJECTION AS NEEDED
Status: DISPENSED | OUTPATIENT
Start: 2020-06-12 | End: 2020-06-12

## 2020-06-12 RX ORDER — SODIUM CHLORIDE 9 MG/ML
10 INJECTION INTRAMUSCULAR; INTRAVENOUS; SUBCUTANEOUS AS NEEDED
Status: DISPENSED | OUTPATIENT
Start: 2020-06-12 | End: 2020-06-12

## 2020-06-12 RX ORDER — PALONOSETRON 0.05 MG/ML
0.25 INJECTION, SOLUTION INTRAVENOUS ONCE
Status: COMPLETED | OUTPATIENT
Start: 2020-06-12 | End: 2020-06-12

## 2020-06-12 RX ORDER — DEXAMETHASONE SODIUM PHOSPHATE 10 MG/ML
10 INJECTION INTRAMUSCULAR; INTRAVENOUS ONCE
Status: COMPLETED | OUTPATIENT
Start: 2020-06-12 | End: 2020-06-12

## 2020-06-12 RX ORDER — HEPARIN 100 UNIT/ML
300-500 SYRINGE INTRAVENOUS AS NEEDED
Status: ACTIVE | OUTPATIENT
Start: 2020-06-12 | End: 2020-06-12

## 2020-06-12 RX ORDER — SODIUM CHLORIDE 9 MG/ML
25 INJECTION, SOLUTION INTRAVENOUS CONTINUOUS
Status: DISPENSED | OUTPATIENT
Start: 2020-06-12 | End: 2020-06-12

## 2020-06-12 RX ADMIN — TRASTUZUMAB 450 MG: 150 INJECTION, POWDER, LYOPHILIZED, FOR SOLUTION INTRAVENOUS at 11:25

## 2020-06-12 RX ADMIN — CARBOPLATIN 700 MG: 10 INJECTION, SOLUTION INTRAVENOUS at 14:05

## 2020-06-12 RX ADMIN — Medication 10 ML: at 09:17

## 2020-06-12 RX ADMIN — DEXAMETHASONE SODIUM PHOSPHATE 10 MG: 10 INJECTION INTRAMUSCULAR; INTRAVENOUS at 10:44

## 2020-06-12 RX ADMIN — Medication 500 UNITS: at 15:08

## 2020-06-12 RX ADMIN — SODIUM CHLORIDE 10 ML: 9 INJECTION, SOLUTION INTRAMUSCULAR; INTRAVENOUS; SUBCUTANEOUS at 09:15

## 2020-06-12 RX ADMIN — PEGFILGRASTIM 6 MG: KIT SUBCUTANEOUS at 15:00

## 2020-06-12 RX ADMIN — Medication 10 ML: at 09:18

## 2020-06-12 RX ADMIN — DOCETAXEL 142 MG: 10 INJECTION, SOLUTION INTRAVENOUS at 12:55

## 2020-06-12 RX ADMIN — PERTUZUMAB 420 MG: 30 INJECTION, SOLUTION, CONCENTRATE INTRAVENOUS at 11:55

## 2020-06-12 RX ADMIN — Medication 10 ML: at 15:08

## 2020-06-12 RX ADMIN — SODIUM CHLORIDE 25 ML/HR: 900 INJECTION, SOLUTION INTRAVENOUS at 10:43

## 2020-06-12 RX ADMIN — SODIUM CHLORIDE 150 MG: 900 INJECTION, SOLUTION INTRAVENOUS at 10:50

## 2020-06-12 RX ADMIN — PALONOSETRON HYDROCHLORIDE 0.25 MG: 0.25 INJECTION, SOLUTION INTRAVENOUS at 10:48

## 2020-06-12 NOTE — PROGRESS NOTES
[facility]  [address], [address] [place] [address] Derek Samuel, [address] [place] [address]  [phone]      Oncology Progress note      Patient: Donna Ferguson MRN: [id number]  SSN: [id number]    YOB: 1975  Age: 53 y.o. Sex: female        Diagnosis:     1. Left breast carcinoma: Dx: 1/31/2020  T2 N1 M0 (Stage IIA) Invasive ductal carcinoma, Tumor size 2 cm, grade 3, ER 0%, IL 0%, Her 2 3+     Treatment:     1. Neoadjuvant chemotherapy   Docetaxel, Carboplatin, Trastuzumab + Pertuzumab   Cycle 6 day 1    Subjective:      Donna Ferguson is a 53 y.o. female who I am seeing in follow up for a diagnosis of left sided invasive breast carcinoma. She felt a lump in her left breast. Dr. Abril Lopez obtained a diagnostic mammogram. The mammogram showed an abnormality in the upper quadrant of the left breast. A biopsy of the lesion revealed a ER 0% IL 0% and Her 2 3+ Gr 3 IDC of the left breast.     Ms. Uziel Nicolas returns today for her neoadjuvant treatment. Diarrhea is manageable with meds. Eating ok. Appetite is fair. She did have a follow up with Dr. Paulina Worthington on 4/29. She is scheduled for a breast MRI on 6/26.        Review of Systems:    Constitutional: negative  Eyes: negative  Ears, Nose, Mouth, Throat, and Face: negative  Respiratory: negative  Cardiovascular: negative  Gastrointestinal: diarrhea  Genitourinary:negative  Integument/Breast: negative  Hematologic/Lymphatic: negative  Musculoskeletal:negative  Neurological: negative    Past Medical History:   Diagnosis Date    Anxiety and depression     Cancer ([address] 01/2020    left breast ca er/pr (-) her 2 (+)     Past Surgical History:   Procedure Laterality Date    HX BREAST BIOPSY Left 07/24/2018    Left sebaceous cyst surgically removed    HX CYST INCISION AND DRAINAGE Left 20+ yrs ago    HX HEENT      tonsillectomy    IR INSERT TUNL CVC W PORT OVER 5 YEARS  2/25/2020      Family History   Problem Relation Age of Onset    Hypertension Mother     Kidney Disease Mother     Thyroid Disease Mother      Social History     Tobacco Use    Smoking status: Never Smoker    Smokeless tobacco: Never Used   Substance Use Topics    Alcohol use: No      Prior to Admission medications    Medication Sig Start Date End Date Taking? Authorizing Provider   ethinyl estradiol-etonogestrel (NuvaRing) 0.12-0.015 mg/24 hr vaginal ring by Intravaginal route. Provider, Historical   ondansetron (ZOFRAN ODT) 4 mg disintegrating tablet Take 1 Tab by mouth every eight (8) hours as needed for Nausea or Vomiting. 4/15/20   Darci Ward NP   prochlorperazine (COMPAZINE) 10 mg tablet Take 0.5 Tabs by mouth every six (6) hours as needed for Nausea for up to 30 doses. 2/11/20   Darci Ward NP   diphenoxylate-atropine (LOMOTIL) 2.5-0.025 mg per tablet Take 1 Tab by mouth four (4) times daily as needed for Diarrhea. Max Daily Amount: 4 Tabs. 2/11/20   Darci Ward NP   lidocaine-prilocaine (EMLA) topical cream Apply  to affected area as needed for Pain. 2/11/20   Morris Marquez NP   dexAMETHasone (DECADRON) 4 mg tablet Take 8 mg by mouth two (2) times daily (with meals). The day of chemotherapy and the day after chemotherapy. 2/11/20   Morris Marquez NP   VIIBRYD 40 mg tab tablet TAKE 1 TABLET BY MOUTH EVERY DAY 6/19/18   Provider, Historical          No Known Allergies      Objective:     Vitals:    06/12/20 0909   BP: 128/70   Pulse: 99   Resp: 16   Temp: 97.8 °F (36.6 °C)   Weight: 172 lb (78 kg)   Height: 5' 6\" (1.676 m)      Pain Scale: 0/10      Physical Exam:    GENERAL: alert, cooperative, no distress, appears stated age  EYE: conjunctivae/corneas clear. PERRL, EOM's intact  LYMPHATIC: Cervical, supraclavicular, and axillary nodes normal.   THROAT & NECK: normal and no erythema or exudates noted.    LUNG: clear to auscultation bilaterally  HEART: regular rate and rhythm, S1, S2 normal, no murmur, click, rub or gallop  ABDOMEN: soft, non-tender. Bowel sounds normal. No masses,  no organomegaly  EXTREMITIES:  extremities normal, atraumatic, no cyanosis or edema  SKIN: Normal.  NEUROLOGIC: AOx3. Gait normal. Reflexes and motor strength normal and symmetric. Cranial nerves 2-12 and sensation grossly intact. Lab Results   Component Value Date/Time    WBC 4.8 05/22/2020 09:10 AM    HGB 10.9 (L) 05/22/2020 09:10 AM    HCT 32.2 (L) 05/22/2020 09:10 AM    PLATELET 692 55/78/9338 09:10 AM    MCV 97.3 05/22/2020 09:10 AM       Lab Results   Component Value Date/Time    Sodium 138 05/22/2020 09:10 AM    Potassium 4.2 05/22/2020 09:10 AM    Chloride 107 05/22/2020 09:10 AM    CO2 25 05/22/2020 09:10 AM    Anion gap 6 05/22/2020 09:10 AM    Glucose 103 (H) 05/22/2020 09:10 AM    BUN 18 05/22/2020 09:10 AM    Creatinine 1.08 (H) 05/22/2020 09:10 AM    BUN/Creatinine ratio 17 05/22/2020 09:10 AM    GFR est AA >60 05/22/2020 09:10 AM    GFR est non-AA 55 (L) 05/22/2020 09:10 AM    Calcium 8.7 05/22/2020 09:10 AM    Bilirubin, total 0.3 05/22/2020 09:10 AM    Alk. phosphatase 101 05/22/2020 09:10 AM    Protein, total 6.8 05/22/2020 09:10 AM    Albumin 3.2 (L) 05/22/2020 09:10 AM    Globulin 3.6 05/22/2020 09:10 AM    A-G Ratio 0.9 (L) 05/22/2020 09:10 AM    ALT (SGPT) 38 05/22/2020 09:10 AM           Assessment:     1. Left breast carcinoma:  T2 N1 M0 (Stage IIA) Invasive ductal carcinoma, Tumor size 2 cm, grade 3, ER 0%, [place] 0%, Her 2 3+        ECOG PS 0  Intent of Treatment - curative  Prognosis: excellent    LVEF (5/12/2020) - 50-55%    Receiving neoadjuvant chemotherapy   Docetaxel, Carboplatin, Trastuzumab + Pertuzumab   Cycle 6 day 1    Tolerating treatment   A detailed system by system evaluation of side effect was performed to assess chemotherapy related toxicity. Blood counts are acceptable. Results reviewed with the patient. She had a follow up with Dr. Soheila Pompa on 4/29. Lump is no longer palpable.  US shows decrease in size of axillary LN. 2. Anemia from chemotherapy    Observation      3. CINV    Antiemetics      4. Diarrhea    Imodium + lomotil      Plan:       · Proceed with C#6 TCH-P (Trastuzumab 8mg/kg load with cycle 1 then 6mg/kg, Docetaxel 75mg/m2, Carboplatin AUC 6, Pertuzumab 840mg load with cycle 1 then 420mg) given every 3 weeks x 6 cycles  · Labs: CBC, CMP prior to each treatment. Troponin, proBNP prior to treatment and every 12 weeks  · Antiemetic Prophylaxis: Palonosetron and dexamethasone prior to chemo  · PRN Antiemetics: Ondansetron, Compazine  · Swelling prophylaxis: Dexmethasone 8mg bid the day before, day of and day after chemotherapy  · TTE prior to chemotherapy and every 12 weeks while on Trastuzumab  · Neulasta 24-72 hours after each treatment  · Breast MRI scheduled for 6/26  · Surgery in July with Dr. Tia Dobson  · Appointment with Dr. Allie Larkin August 19th  · Return to clinic at start of maintenance Herceptin + perjeta in Early August      I saw the patient in conjunction with SULAIMAN Healy. Signed by: Donavon Matson NP                     June 12, 2020        CC. Evelyn De Los Santos MD  CC.  Dylan Leyva MD

## 2020-06-12 NOTE — LETTER
6/12/20 Patient: Dwayne Last YOB: 1975 Date of Visit: 6/12/2020 Daljit Randall MD 
[facility] [address] [address] VIA Facsimile: [phone] Dear Daljit Randall MD, Thank you for referring Ms. Elizabeth Pimentel to [address] [place] [address] for evaluation. My notes for this consultation are attached. If you have questions, please do not hesitate to call me. I look forward to following your patient along with you.  
 
 
Sincerely, 
 
Sri Christianson NP

## 2020-06-12 NOTE — PROGRESS NOTES
Carol Barr is a 53 y.o. cauc female here for follow up for:  Chief Complaint   Patient presents with    Breast Cancer     left    Chemotherapy   Cycle 6 Day 1 TCH&P    1. Have you been to the ER, urgent care clinic since your last visit? Hospitalized since your last visit? No    2. Have you seen or consulted any other health care providers outside of the [address] [place] [address] since your last visit? Include any pap smears or colon screening. No    Pt states everything has been fine since last visit. No concerns brought up.

## 2020-06-12 NOTE — PROGRESS NOTES
[facility] VISIT NOTE    8122  Pt arrived at [facility] ambulatory and in no distress for C6 TCHP. Assessment completed, pt c/o intermittent diarrhea, sometimes 5 times in one day, managed well with prn imodium and lomotil. Blood pressure 128/70, pulse 99, temperature 97.8 °F (36.6 °C), resp. rate 16, height 5' 6\" (1.676 m), weight 78.3 kg (172 lb 11.2 oz), not currently breastfeeding. Right chest port accessed with 0.75 in zaman no difficulty. Positive blood return noted and labs drawn. Patient proceeded to MD clinic visit. Lab results are within treatment parameters. Recent Results (from the past 12 hour(s))   CBC WITH AUTOMATED DIFF    Collection Time: 06/12/20  9:15 AM   Result Value Ref Range    WBC 3.7 3.6 - 11.0 K/uL    RBC 3.09 (L) 3.80 - 5.20 M/uL    HGB 10.4 (L) 11.5 - 16.0 g/dL    HCT 30.7 (L) 35.0 - 47.0 %    MCV 99.4 (H) 80.0 - 99.0 FL    MCH 33.7 26.0 - 34.0 PG    MCHC 33.9 30.0 - 36.5 g/dL    RDW 15.2 (H) 11.5 - 14.5 %    PLATELET 590 445 - 596 K/uL    MPV 10.1 8.9 - 12.9 FL    NRBC 0.0 0  WBC    ABSOLUTE NRBC 0.00 0.00 - 0.01 K/uL    NEUTROPHILS 64 32 - 75 %    LYMPHOCYTES 24 12 - 49 %    MONOCYTES 10 5 - 13 %    EOSINOPHILS 0 0 - 7 %    BASOPHILS 1 0 - 1 %    IMMATURE GRANULOCYTES 1 (H) 0.0 - 0.5 %    ABS. NEUTROPHILS 2.4 1.8 - 8.0 K/UL    ABS. LYMPHOCYTES 0.9 0.8 - 3.5 K/UL    ABS. MONOCYTES 0.4 0.0 - 1.0 K/UL    ABS. EOSINOPHILS 0.0 0.0 - 0.4 K/UL    ABS. BASOPHILS 0.0 0.0 - 0.1 K/UL    ABS. IMM.  GRANS. 0.0 0.00 - 0.04 K/UL    DF AUTOMATED     METABOLIC PANEL, COMPREHENSIVE    Collection Time: 06/12/20  9:15 AM   Result Value Ref Range    Sodium 142 136 - 145 mmol/L    Potassium 3.5 3.5 - 5.1 mmol/L    Chloride 110 (H) 97 - 108 mmol/L    CO2 25 21 - 32 mmol/L    Anion gap 7 5 - 15 mmol/L    Glucose 103 (H) 65 - 100 mg/dL    BUN 17 6 - 20 MG/DL    Creatinine 0.88 0.55 - 1.02 MG/DL    BUN/Creatinine ratio 19 12 - 20      GFR est AA >60 >60 ml/min/1.73m2    GFR est non-AA >60 >60 ml/min/1.73m2    Calcium 8.3 (L) 8.5 - 10.1 MG/DL    Bilirubin, total 0.4 0.2 - 1.0 MG/DL    ALT (SGPT) 49 12 - 78 U/L    AST (SGOT) 32 15 - 37 U/L    Alk. phosphatase 101 45 - 117 U/L    Protein, total 6.1 (L) 6.4 - 8.2 g/dL    Albumin 2.9 (L) 3.5 - 5.0 g/dL    Globulin 3.2 2.0 - 4.0 g/dL    A-G Ratio 0.9 (L) 1.1 - 2.2       Medications received:  NS IV @kvo  Aloxi IVP  Dexamethasone IVP  Emend IV  Herceptin IV  Perjeta IV  Taxotere IV  Carboplatin IV  Neulasta OBI    Education provided to patient about Neulasta On Body Injector including: side effects, how/when to remove the device, as well as what to do in the event of device malfunction. Opportunity for questions was provided and all questions were answered. Patient verbalized understanding. Blood pressure 115/69, pulse 96, temperature 97.8 °F (36.6 °C), resp. rate 16, height 5' 6\" (1.676 m), weight 78.3 kg (172 lb 11.2 oz), not currently breastfeeding. Tolerated treatment well, no adverse reaction noted. Port de-accessed and flushed per protocol. Positive blood return noted. 1515  D/C'd from [address] [place] [address] and in no distress.  Next appointment is 7/31/20 at [address] [place] [address].

## 2020-06-24 ENCOUNTER — DOCUMENTATION ONLY (OUTPATIENT)
Dept: SURGERY | Age: 45
End: 2020-06-24

## 2020-06-26 ENCOUNTER — HOSPITAL ENCOUNTER (OUTPATIENT)
Dept: MRI IMAGING | Age: 45
Discharge: HOME | End: 2020-06-26
Attending: SURGERY
Payer: COMMERCIAL

## 2020-06-26 DIAGNOSIS — C50.912 MALIGNANT NEOPLASM OF LEFT FEMALE BREAST, UNSPECIFIED ESTROGEN RECEPTOR STATUS, UNSPECIFIED SITE OF BREAST (HCC): ICD-10-CM

## 2020-06-26 PROCEDURE — 77049 MRI BREAST C-+ W/CAD BI: CPT

## 2020-06-26 PROCEDURE — 74011250636 HC RX REV CODE- 250/636: Performed by: SURGERY

## 2020-06-26 PROCEDURE — 74011000258 HC RX REV CODE- 258: Performed by: SURGERY

## 2020-06-26 PROCEDURE — A9585 GADOBUTROL INJECTION: HCPCS | Performed by: SURGERY

## 2020-06-26 RX ORDER — SODIUM CHLORIDE 0.9 % (FLUSH) 0.9 %
10 SYRINGE (ML) INJECTION
Status: COMPLETED | OUTPATIENT
Start: 2020-06-26 | End: 2020-06-26

## 2020-06-26 RX ADMIN — GADOBUTROL 8.5 ML: 604.72 INJECTION INTRAVENOUS at 10:00

## 2020-06-26 RX ADMIN — Medication 10 ML: at 10:00

## 2020-06-26 RX ADMIN — SODIUM CHLORIDE 100 ML: 900 INJECTION, SOLUTION INTRAVENOUS at 10:00

## 2020-07-01 DIAGNOSIS — Z17.1 MALIGNANT NEOPLASM OF LEFT BREAST IN FEMALE, ESTROGEN RECEPTOR NEGATIVE, UNSPECIFIED SITE OF BREAST (HCC): Primary | ICD-10-CM

## 2020-07-01 DIAGNOSIS — C50.912 MALIGNANT NEOPLASM OF LEFT BREAST IN FEMALE, ESTROGEN RECEPTOR NEGATIVE, UNSPECIFIED SITE OF BREAST (HCC): Primary | ICD-10-CM

## 2020-07-01 RX ORDER — ALPRAZOLAM 0.5 MG/1
0.5 TABLET ORAL
COMMUNITY
End: 2020-07-09

## 2020-07-01 NOTE — PERIOP NOTES
[facility]  Ambulatory Surgery Unit  Pre-operative Instructions    Surgery/Procedure Date  7/9/20            Tentative Arrival Time 0600      1. On the day of your surgery/procedure, please report to the Ambulatory Surgery Unit Registration Desk and sign in at your designated time. The Ambulatory Surgery Unit is located in [facility] on the [facility] side of the \[facility] [place] [facility]\"" across from the [address] [place] [address]. Please have all of your health insurance cards and a photo ID. 2. You must have someone with you to drive you home, as you should not drive a car for 24 hours following anesthesia. Please make arrangements for a responsible adult friend or family member to stay with you for at least the first 24 hours after your surgery. 3. Do not have anything to eat or drink (including water, gum, mints, coffee, juice) after 11:59 PM  . This may not apply to medications prescribed by your physician. (Please note below the special instructions with medications to take the morning of surgery, if applicable.)    4. We recommend you do not drink any alcoholic beverages for 24 hours before and after your surgery. 5. Contact your surgeons office for instructions on the following medications: non-steroidal anti-inflammatory drugs (i.e. Advil, Aleve), vitamins, and supplements. (Some surgeons will want you to stop these medications prior to surgery and others may allow you to take them)   **If you are currently taking Plavix, Coumadin, Aspirin and/or other blood-thinning agents, contact your surgeon for instructions. ** Your surgeon will partner with the physician prescribing these medications to determine if it is safe to stop or if you need to continue taking. Please do not stop taking these medications without instructions from your surgeon.     6. In an effort to help prevent surgical site infection, we ask that you shower with an anti-bacterial soap (i.e. Dial/Safeguard, or the soap provided to you at your preadmission testing appointment) for 3 days prior to and on the morning of surgery, using a fresh towel after each shower. (Please begin this process with fresh bed linens.) Do not apply any lotions, powders, or deodorants after the shower on the day of your procedure. If applicable, please do not shave the operative site for 48 hours prior to surgery. 7. Wear comfortable clothes. Wear glasses instead of contacts. Do not bring any jewelry or money (other than copays or fees as instructed). Do not wear make-up, particularly mascara, the morning of your surgery. Do not wear nail polish, particularly if you are having foot /hand surgery. Wear your hair loose or down, no ponytails, buns, john pins or clips. All body piercings must be removed. 8. You should understand that if you do not follow these instructions your surgery may be cancelled. If your physical condition changes (i.e. fever, cold or flu) please contact your surgeon as soon as possible. 9. It is important that you be on time. If a situation occurs where you may be late, or if you have any questions or problems, please call [phone].    10. Your surgery time may be subject to change. You will receive a phone call the day prior to surgery to confirm your arrival time. 11. Pediatric patients: please bring a change of clothes, diapers, bottle/sippy cup, pacifier, etc.      Special Instructions:Xanax if needed for anxiety    Take all medications and inhalers, as prescribed, on the morning of surgery with a sip of water EXCEPT: no exceptions      Insulin Dependent Diabetic patients: Take your diabetic medications as prescribed the day before surgery. Hold all diabetic medications the day of surgery. If you are scheduled to arrive for surgery after 8:00 AM, and your AM blood sugar is >200, please call Ambulatory Surgery.     In an effort to improve the efficiency, privacy, and safety for all of our Pre-op patients visitors are not allowed in the Holding area. Once you arrive and are registered your family/visitors will be asked to remain in your vehicle. The Pre-op staff will call you when they are ready for you to enter the building and will explain to you and your family/visitors that the Pre-op phase is beginning. At this time, if your procedure is outpatient, your family member will be asked to stay in their vehicle until the surgery is complete and you are ready for discharge. If you are going to be admitted after your surgery, once you are called to come inside the building, your family will be able to leave the parking lot. At this time the staff will also ask for your designated spokesperson information in the event that the physician or staff need to provide an update or obtain any pertinent information. The designated spokesperson will be notified if the physician needs to speak to family during the pre-operative phase.and/or in the post op phase. I understand a pre-operative phone call will be made to verify my surgery time. In the event that I am not available, I give permission for a message to be left on my answering service and/or with another person?       {yes @ [phone].         ___________________      ___________________      ________________  (Signature of Patient)          (Witness)                   (Date and Time)

## 2020-07-02 ENCOUNTER — DOCUMENTATION ONLY (OUTPATIENT)
Dept: SURGERY | Age: 45
End: 2020-07-02

## 2020-07-02 NOTE — PROGRESS NOTES
I called to go over the surgery information again. I had to leave my name & number. I let her know I would be in on Monday July 6th if she has any questions.

## 2020-07-03 ENCOUNTER — APPOINTMENT (OUTPATIENT)
Dept: INFUSION THERAPY | Age: 45
End: 2020-07-03

## 2020-07-05 ENCOUNTER — HOSPITAL ENCOUNTER (OUTPATIENT)
Dept: PREADMISSION TESTING | Age: 45
Discharge: HOME | End: 2020-07-05
Payer: COMMERCIAL

## 2020-07-05 PROCEDURE — 87635 SARS-COV-2 COVID-19 AMP PRB: CPT

## 2020-07-06 LAB
SARS-COV-2, COV2NT: NOT DETECTED
SOURCE, COVRS: NORMAL
SPECIMEN SOURCE, FCOV2M: NORMAL

## 2020-07-07 ENCOUNTER — DOCUMENTATION ONLY (OUTPATIENT)
Dept: SURGERY | Age: 45
End: 2020-07-07

## 2020-07-08 ENCOUNTER — HOSPITAL ENCOUNTER (OUTPATIENT)
Dept: NUCLEAR MEDICINE | Age: 45
Discharge: HOME | End: 2020-07-08
Attending: SURGERY
Payer: COMMERCIAL

## 2020-07-08 ENCOUNTER — ANESTHESIA EVENT (OUTPATIENT)
Dept: SURGERY | Age: 45
End: 2020-07-08
Payer: COMMERCIAL

## 2020-07-08 DIAGNOSIS — C50.912 MALIGNANT NEOPLASM OF LEFT BREAST (HCC): ICD-10-CM

## 2020-07-08 PROCEDURE — 78195 LYMPH SYSTEM IMAGING: CPT

## 2020-07-09 ENCOUNTER — HOSPITAL ENCOUNTER (OUTPATIENT)
Age: 45
Setting detail: OUTPATIENT SURGERY
Discharge: HOME | End: 2020-07-09
Attending: SURGERY | Admitting: SURGERY
Payer: COMMERCIAL

## 2020-07-09 ENCOUNTER — APPOINTMENT (OUTPATIENT)
Dept: MAMMOGRAPHY | Age: 45
End: 2020-07-09
Attending: SURGERY
Payer: COMMERCIAL

## 2020-07-09 ENCOUNTER — ANESTHESIA (OUTPATIENT)
Dept: SURGERY | Age: 45
End: 2020-07-09
Payer: COMMERCIAL

## 2020-07-09 VITALS
SYSTOLIC BLOOD PRESSURE: 137 MMHG | DIASTOLIC BLOOD PRESSURE: 88 MMHG | BODY MASS INDEX: 27.64 KG/M2 | OXYGEN SATURATION: 97 % | HEART RATE: 93 BPM | RESPIRATION RATE: 16 BRPM | TEMPERATURE: 97.8 F | HEIGHT: 66 IN | WEIGHT: 172 LBS

## 2020-07-09 DIAGNOSIS — Z17.1 MALIGNANT NEOPLASM OF LEFT BREAST IN FEMALE, ESTROGEN RECEPTOR NEGATIVE, UNSPECIFIED SITE OF BREAST (HCC): ICD-10-CM

## 2020-07-09 DIAGNOSIS — R92.8 ABNORMAL MAMMOGRAM: ICD-10-CM

## 2020-07-09 DIAGNOSIS — C50.912 MALIGNANT NEOPLASM OF LEFT BREAST IN FEMALE, ESTROGEN RECEPTOR NEGATIVE, UNSPECIFIED SITE OF BREAST (HCC): ICD-10-CM

## 2020-07-09 LAB — HCG UR QL: NEGATIVE

## 2020-07-09 PROCEDURE — 74011000250 HC RX REV CODE- 250: Performed by: SURGERY

## 2020-07-09 PROCEDURE — 74011000250 HC RX REV CODE- 250

## 2020-07-09 PROCEDURE — 74011250637 HC RX REV CODE- 250/637: Performed by: SURGERY

## 2020-07-09 PROCEDURE — 77030031139 HC SUT VCRL2 J&J -A: Performed by: SURGERY

## 2020-07-09 PROCEDURE — 77030018836 HC SOL IRR NACL ICUM -A: Performed by: SURGERY

## 2020-07-09 PROCEDURE — 77030013690

## 2020-07-09 PROCEDURE — 77030034626 HC LIGASURE SM JAW SEAL OPN SURG COVD -E: Performed by: SURGERY

## 2020-07-09 PROCEDURE — 74011250636 HC RX REV CODE- 250/636: Performed by: ANESTHESIOLOGY

## 2020-07-09 PROCEDURE — 81025 URINE PREGNANCY TEST: CPT

## 2020-07-09 PROCEDURE — 77030011267 HC ELECTRD BLD COVD -A: Performed by: SURGERY

## 2020-07-09 PROCEDURE — 88332 PATH CONSLTJ SURG EA ADD BLK: CPT

## 2020-07-09 PROCEDURE — 76210000034 HC AMBSU PH I REC 0.5 TO 1 HR: Performed by: SURGERY

## 2020-07-09 PROCEDURE — 77030002933 HC SUT MCRYL J&J -A: Performed by: SURGERY

## 2020-07-09 PROCEDURE — 88331 PATH CONSLTJ SURG 1 BLK 1SPC: CPT

## 2020-07-09 PROCEDURE — 77030002996 HC SUT SLK J&J -A: Performed by: SURGERY

## 2020-07-09 PROCEDURE — 74011250636 HC RX REV CODE- 250/636

## 2020-07-09 PROCEDURE — 77030003594 MAM STEREO PLC NDL/WIRE/CLIP/SEED BREAST LT

## 2020-07-09 PROCEDURE — 74011000250 HC RX REV CODE- 250: Performed by: RADIOLOGY

## 2020-07-09 PROCEDURE — 77030011640 HC PAD GRND REM COVD -A: Performed by: SURGERY

## 2020-07-09 PROCEDURE — 76060000063 HC AMB SURG ANES 1.5 TO 2 HR: Performed by: SURGERY

## 2020-07-09 PROCEDURE — 76210000046 HC AMBSU PH II REC FIRST 0.5 HR: Performed by: SURGERY

## 2020-07-09 PROCEDURE — 76030000003 HC AMB SURG OR TIME 1.5 TO 2: Performed by: SURGERY

## 2020-07-09 PROCEDURE — 88341 IMHCHEM/IMCYTCHM EA ADD ANTB: CPT

## 2020-07-09 PROCEDURE — 88342 IMHCHEM/IMCYTCHM 1ST ANTB: CPT

## 2020-07-09 PROCEDURE — 77030020143 HC AIRWY LARYN INTUB CGAS -A

## 2020-07-09 PROCEDURE — 77030021352 HC CBL LD SYS DISP COVD -B: Performed by: SURGERY

## 2020-07-09 PROCEDURE — 74011250636 HC RX REV CODE- 250/636: Performed by: SURGERY

## 2020-07-09 PROCEDURE — 88307 TISSUE EXAM BY PATHOLOGIST: CPT

## 2020-07-09 RX ORDER — DEXAMETHASONE SODIUM PHOSPHATE 4 MG/ML
INJECTION, SOLUTION INTRA-ARTICULAR; INTRALESIONAL; INTRAMUSCULAR; INTRAVENOUS; SOFT TISSUE AS NEEDED
Status: DISCONTINUED | OUTPATIENT
Start: 2020-07-09 | End: 2020-07-09 | Stop reason: HOSPADM

## 2020-07-09 RX ORDER — MIDAZOLAM HYDROCHLORIDE 1 MG/ML
INJECTION, SOLUTION INTRAMUSCULAR; INTRAVENOUS AS NEEDED
Status: DISCONTINUED | OUTPATIENT
Start: 2020-07-09 | End: 2020-07-09 | Stop reason: HOSPADM

## 2020-07-09 RX ORDER — LIDOCAINE HYDROCHLORIDE 10 MG/ML
4 INJECTION, SOLUTION EPIDURAL; INFILTRATION; INTRACAUDAL; PERINEURAL
Status: COMPLETED | OUTPATIENT
Start: 2020-07-09 | End: 2020-07-09

## 2020-07-09 RX ORDER — LIDOCAINE HYDROCHLORIDE 20 MG/ML
INJECTION, SOLUTION EPIDURAL; INFILTRATION; INTRACAUDAL; PERINEURAL AS NEEDED
Status: DISCONTINUED | OUTPATIENT
Start: 2020-07-09 | End: 2020-07-09 | Stop reason: HOSPADM

## 2020-07-09 RX ORDER — ONDANSETRON 2 MG/ML
INJECTION INTRAMUSCULAR; INTRAVENOUS AS NEEDED
Status: DISCONTINUED | OUTPATIENT
Start: 2020-07-09 | End: 2020-07-09 | Stop reason: HOSPADM

## 2020-07-09 RX ORDER — WATER FOR INJECTION,STERILE
VIAL (ML) INJECTION
Status: DISCONTINUED
Start: 2020-07-09 | End: 2020-07-09 | Stop reason: HOSPADM

## 2020-07-09 RX ORDER — MORPHINE SULFATE 10 MG/ML
2 INJECTION, SOLUTION INTRAMUSCULAR; INTRAVENOUS
Status: DISCONTINUED | OUTPATIENT
Start: 2020-07-09 | End: 2020-07-09 | Stop reason: HOSPADM

## 2020-07-09 RX ORDER — HYDROMORPHONE HYDROCHLORIDE 1 MG/ML
.2-.5 INJECTION, SOLUTION INTRAMUSCULAR; INTRAVENOUS; SUBCUTANEOUS ONCE
Status: DISCONTINUED | OUTPATIENT
Start: 2020-07-09 | End: 2020-07-09 | Stop reason: HOSPADM

## 2020-07-09 RX ORDER — SODIUM CHLORIDE 0.9 % (FLUSH) 0.9 %
5-40 SYRINGE (ML) INJECTION EVERY 8 HOURS
Status: DISCONTINUED | OUTPATIENT
Start: 2020-07-09 | End: 2020-07-09 | Stop reason: HOSPADM

## 2020-07-09 RX ORDER — KETOROLAC TROMETHAMINE 30 MG/ML
INJECTION, SOLUTION INTRAMUSCULAR; INTRAVENOUS AS NEEDED
Status: DISCONTINUED | OUTPATIENT
Start: 2020-07-09 | End: 2020-07-09

## 2020-07-09 RX ORDER — SODIUM CHLORIDE 0.9 % (FLUSH) 0.9 %
5-40 SYRINGE (ML) INJECTION AS NEEDED
Status: DISCONTINUED | OUTPATIENT
Start: 2020-07-09 | End: 2020-07-09 | Stop reason: HOSPADM

## 2020-07-09 RX ORDER — FENTANYL CITRATE 50 UG/ML
INJECTION, SOLUTION INTRAMUSCULAR; INTRAVENOUS AS NEEDED
Status: DISCONTINUED | OUTPATIENT
Start: 2020-07-09 | End: 2020-07-09 | Stop reason: HOSPADM

## 2020-07-09 RX ORDER — SODIUM CHLORIDE, SODIUM LACTATE, POTASSIUM CHLORIDE, CALCIUM CHLORIDE 600; 310; 30; 20 MG/100ML; MG/100ML; MG/100ML; MG/100ML
25 INJECTION, SOLUTION INTRAVENOUS CONTINUOUS
Status: DISCONTINUED | OUTPATIENT
Start: 2020-07-09 | End: 2020-07-09 | Stop reason: HOSPADM

## 2020-07-09 RX ORDER — DIPHENHYDRAMINE HYDROCHLORIDE 50 MG/ML
12.5 INJECTION, SOLUTION INTRAMUSCULAR; INTRAVENOUS AS NEEDED
Status: DISCONTINUED | OUTPATIENT
Start: 2020-07-09 | End: 2020-07-09 | Stop reason: HOSPADM

## 2020-07-09 RX ORDER — PROPOFOL 10 MG/ML
INJECTION, EMULSION INTRAVENOUS AS NEEDED
Status: DISCONTINUED | OUTPATIENT
Start: 2020-07-09 | End: 2020-07-09 | Stop reason: HOSPADM

## 2020-07-09 RX ORDER — SODIUM CHLORIDE 0.9 % (FLUSH) 0.9 %
SYRINGE (ML) INJECTION
Status: DISCONTINUED
Start: 2020-07-09 | End: 2020-07-09 | Stop reason: HOSPADM

## 2020-07-09 RX ORDER — OXYCODONE AND ACETAMINOPHEN 5; 325 MG/1; MG/1
1 TABLET ORAL
Status: DISCONTINUED | OUTPATIENT
Start: 2020-07-09 | End: 2020-07-09 | Stop reason: HOSPADM

## 2020-07-09 RX ORDER — CEFAZOLIN SODIUM 1 G/3ML
INJECTION, POWDER, FOR SOLUTION INTRAMUSCULAR; INTRAVENOUS
Status: DISCONTINUED
Start: 2020-07-09 | End: 2020-07-09 | Stop reason: HOSPADM

## 2020-07-09 RX ORDER — NALOXONE HYDROCHLORIDE 4 MG/.1ML
SPRAY NASAL
Qty: 1 EACH | Refills: 0 | Status: ON HOLD | OUTPATIENT
Start: 2020-07-09 | End: 2021-04-06

## 2020-07-09 RX ORDER — OXYCODONE AND ACETAMINOPHEN 5; 325 MG/1; MG/1
1 TABLET ORAL
Qty: 30 TAB | Refills: 0 | Status: SHIPPED | OUTPATIENT
Start: 2020-07-09 | End: 2020-07-14

## 2020-07-09 RX ORDER — LIDOCAINE HYDROCHLORIDE 10 MG/ML
0.1 INJECTION, SOLUTION EPIDURAL; INFILTRATION; INTRACAUDAL; PERINEURAL AS NEEDED
Status: DISCONTINUED | OUTPATIENT
Start: 2020-07-09 | End: 2020-07-09 | Stop reason: HOSPADM

## 2020-07-09 RX ORDER — FENTANYL CITRATE 50 UG/ML
25 INJECTION, SOLUTION INTRAMUSCULAR; INTRAVENOUS
Status: DISCONTINUED | OUTPATIENT
Start: 2020-07-09 | End: 2020-07-09 | Stop reason: HOSPADM

## 2020-07-09 RX ADMIN — FENTANYL CITRATE 50 MCG: 50 INJECTION, SOLUTION INTRAMUSCULAR; INTRAVENOUS at 09:47

## 2020-07-09 RX ADMIN — PROPOFOL 200 MG: 10 INJECTION, EMULSION INTRAVENOUS at 09:32

## 2020-07-09 RX ADMIN — FENTANYL CITRATE 25 MCG: 50 INJECTION, SOLUTION INTRAMUSCULAR; INTRAVENOUS at 10:07

## 2020-07-09 RX ADMIN — SODIUM CHLORIDE, SODIUM LACTATE, POTASSIUM CHLORIDE, AND CALCIUM CHLORIDE 25 ML/HR: 600; 310; 30; 20 INJECTION, SOLUTION INTRAVENOUS at 07:03

## 2020-07-09 RX ADMIN — LIDOCAINE HYDROCHLORIDE 20 MG: 20 INJECTION, SOLUTION EPIDURAL; INFILTRATION; INTRACAUDAL; PERINEURAL at 09:32

## 2020-07-09 RX ADMIN — ONDANSETRON HYDROCHLORIDE 4 MG: 2 INJECTION, SOLUTION INTRAMUSCULAR; INTRAVENOUS at 09:40

## 2020-07-09 RX ADMIN — Medication 3 AMPULE: at 07:03

## 2020-07-09 RX ADMIN — MIDAZOLAM HYDROCHLORIDE 2 MG: 1 INJECTION, SOLUTION INTRAMUSCULAR; INTRAVENOUS at 09:29

## 2020-07-09 RX ADMIN — LIDOCAINE HYDROCHLORIDE 4 ML: 10 INJECTION, SOLUTION EPIDURAL; INFILTRATION; INTRACAUDAL; PERINEURAL at 09:00

## 2020-07-09 RX ADMIN — DEXAMETHASONE SODIUM PHOSPHATE 4 MG: 4 INJECTION, SOLUTION INTRAMUSCULAR; INTRAVENOUS at 09:40

## 2020-07-09 RX ADMIN — WATER 2 G: 1 INJECTION INTRAMUSCULAR; INTRAVENOUS; SUBCUTANEOUS at 09:28

## 2020-07-09 RX ADMIN — FENTANYL CITRATE 25 MCG: 50 INJECTION, SOLUTION INTRAMUSCULAR; INTRAVENOUS at 09:53

## 2020-07-09 NOTE — PERIOP NOTES
Lamonteisaias Hari  1975  [id number]    Situation:  Verbal report given from: ALICE Saleem RN and Theodora Reeves CRNA  Procedure: Procedure(s):  LEFT BREAST LUMPECTOMY AND LEFT BREAST SENTINEL NODE BIOPSY  SENTINEL NODE BIOPSY    Background:    Preoperative diagnosis: LEFT BREAST CANCER    Postoperative diagnosis: LEFT BREAST CANCER    :  Dr. Marlen Gomez    Assistant(s): Circ-1: Satnam Celis RN  Scrub Tech-1: Fly Records C  Surg Asst-1: Alveda Radford    Specimens:   ID Type Source Tests Collected by Time Destination   1 : Left axillary sentinel lymph node number one Frozen Section Lymph Node  Royal Dukes MD 7/9/2020 3258 Pathology   2 : Left axillary sentinel lymph node number two Frozen Section Lymph Node  Royal Dukes MD 7/9/2020 1000 Pathology   3 : Right axillary sentinel lymph node number three Frozen Section Lymph Node  Royal Dukes MD 7/9/2020 1001 Pathology   4 : Right axillary sentinel lymph node number four Frozen Section Lymph Node  Royal Dukes MD 7/9/2020 1002 Pathology   5 : Left breast lumpectomy Fresh   Royal Dukes MD 7/9/2020 1017 Pathology   6 : Left breast medial margin Preservative Breast  Royal Dukes MD 7/9/2020 1021 Pathology   7 : Left breast superior margin Preservative Breast  Royal Dukes MD 7/9/2020 1022 Pathology   8 : Left breast lateral margin Preservative Breast  Royal Dukes MD 7/9/2020 1023 Pathology   9 : Left breast inferior margin Preservative Breast  Royal Dukes MD 7/9/2020 1025 Pathology   10 : Left breast anterior margin Preservative Breast  Royal Dukes MD 7/9/2020 1026 Pathology   11 : Left breast deep margin Preservative Breast  Royal Dukes MD 7/9/2020 1026 Pathology       Assessment:  Intra-procedure medications         Anesthesia gave intra-procedure sedation and medications, see anesthesia flow sheet     Intravenous fluids: LR@ KVO     Vital signs stable       Recommendation:    Permission to share finding with  : yes

## 2020-07-09 NOTE — BRIEF OP NOTE
Brief Postoperative Note    Patient: Jose Alfredo Robbins  YOB: 1975  MRN: [id number]    Date of Procedure: 7/9/2020     Pre-Op Diagnosis: LEFT BREAST CANCER with axillary mets    Post-Op Diagnosis: Same as preoperative diagnosis. Procedure(s):  LEFT BREAST LUMPECTOMY AND LEFT BREAST SENTINEL NODE BIOPSY  SENTINEL NODE BIOPSY    Surgeon(s): Deanie Harada, MD    Surgical Assistant: None    Anesthesia: General     Estimated Blood Loss (mL): Minimal    Complications: None    Specimens:   ID Type Source Tests Collected by Time Destination   1 : Right axillary sentinel lymph node number one Frozen Section Lymph Node  Deanie Harada, MD 7/9/2020 6485 Pathology   2 : Right axillary sentinel lymph node number two Frozen Section Lymph Node  Deanie Harada, MD 7/9/2020 1000 Pathology   3 : Right axillary sentinel lymph node number three Frozen Section Lymph Node  Deanie Harada, MD 7/9/2020 1001 Pathology   4 : Right axillary sentinel lymph node number four Frozen Section Lymph Node  Deanie Harada, MD 7/9/2020 1002 Pathology   5 : Left breast lumpectomy Fresh   Deanie Harada, MD 7/9/2020 1017 Pathology   6 : Left breast medial margin Preservative Breast  Deanie Harada, MD 7/9/2020 1021 Pathology   7 : Left breast superior margin Preservative Breast  Deanie Harada, MD 7/9/2020 1022 Pathology   8 : Left breast lateral margin Preservative Breast  Deanie Harada, MD 7/9/2020 1023 Pathology   9 : Left breast inferior margin Preservative Breast  Deanie Harada, MD 7/9/2020 1025 Pathology   10 : Left breast anterior margin Marcelleative Breast  Deanie Harada, MD 7/9/2020 1026 Pathology   11 : Left breast deep margin Preservative Breast  Deanie Harada, MD 7/9/2020 1026 Pathology        Implants: * No implants in log *    Drains: * No LDAs found *    Findings: wire and specimen removed, sln found with clip, 3 other nodes removed, negative.      Electronically Signed by Dominic Guerrero MD on 7/9/2020 at 11:02 AM    Dictated stat

## 2020-07-09 NOTE — ANESTHESIA POSTPROCEDURE EVALUATION
Procedure(s):  LEFT BREAST WIRE-LOCALIZED LUMPECTOMY AND LEFT BREAST SENTINEL NODE BIOPSY  SENTINEL NODE BIOPSY.     general    Anesthesia Post Evaluation      Multimodal analgesia: multimodal analgesia used between 6 hours prior to anesthesia start to PACU discharge  Patient location during evaluation: PACU  Patient participation: complete - patient participated  Level of consciousness: sleepy but conscious and responsive to verbal stimuli  Pain score: 0  Airway patency: patent  Anesthetic complications: no  Cardiovascular status: acceptable  Respiratory status: acceptable  Hydration status: acceptable  Post anesthesia nausea and vomiting:  none  Final Post Anesthesia Temperature Assessment:  Normothermia (36.0-37.5 degrees C)      INITIAL Post-op Vital signs:   Vitals Value Taken Time   /73 7/9/2020 11:45 AM   Temp 36.6 °C (97.8 °F) 7/9/2020 11:30 AM   Pulse 82 7/9/2020 11:45 AM   Resp 12 7/9/2020 11:45 AM   SpO2 100 % 7/9/2020 11:45 AM

## 2020-07-09 NOTE — H&P
HISTORY OF PRESENT ILLNESS  Young Sin Arevalo is a 53 y.o. female. HPI  ESTABLISHED patient here for follow up during treatment for LEFT breast cancer with neoadjuvent chemotherapy. She is doing well. Denies pain. She is unable to palpate the lump after 3 chemo treatments.       Breast cancer-  1/31/2020 - T2 N1 M0 (Stage IIA) Invasive ductal carcinoma, Tumor size 2 cm, grade 3, ER 0%, [place] 0%, Her 2 3+  2/11/2020 - met with Dr. Yannick Orta. Discussed neoadjuvant chemotherapy ([address] + P)  2/24/2020 - RIGHT breast MRI guided biopsy. Benign. 2/25/2020 - port insertion in IR  2/28/2020 - started neoadjuvant TCH-P - Dr. Yannick Orta     No family history of breast or ovarian cancer. The patient is genetic test negative Sanaz Laws Breast Next 2/2020). Review of Systems   All other systems reviewed and are negative.        Physical Exam  Vitals signs and nursing note reviewed. Chest:      Breasts:         Left: No swelling, bleeding, inverted nipple, mass, nipple discharge, skin change or tenderness. Lymphadenopathy:      Upper Body:      Left upper body: No axillary adenopathy.       BREAST ULTRASOUND, Preop planning  Indication:preop planning  left Breast 2:00    Technique: The area was scanned using a high-frequency linear-array near-field transducer  Findings: left axillary nodes smaller, clip identified. No breast mass on us  Impression: Biopsy site visible with ultrasound  Disposition:  Will schedule post chemo mri   ASSESSMENT and PLAN     ICD-10-CM ICD-9-CM     1. Breast cancer metastasized to axillary lymph node, left (HCC) C50.912 174. 9       C77.3 196. 3     Breast cancer-  1/31/2020 - T2 N1 M0 (Stage IIA) Invasive ductal carcinoma, Tumor size 2 cm, grade 3, ER 0%, [place] 0%, Her 2 3+  2/11/2020 - met with Dr. Yannick Orta. Discussed neoadjuvant chemotherapy ([address] + P)  2/24/2020 - RIGHT breast MRI guided biopsy. Benign.   2/25/2020 - port insertion in IR  2/28/2020 - started neoadjuvant TCH-P - Dr. Yannick Orta     No family history of breast or ovarian cancer. The patient is genetic test negative Angela Riddle Breast Next 2/2020)     Will check post natalie mri. Plan left us guided lumpectomy, sln biopsy poss alnd.

## 2020-07-09 NOTE — OP NOTES
[address]  OPERATIVE REPORT    Name:  Olga Leonard  MR#:  [id number]  :  1975  ACCOUNT #:  [de-identified]  DATE OF SERVICE:  2020      PREOPERATIVE DIAGNOSES:  Left breast cancer with axillary metastasis. POSTOPERATIVE DIAGNOSES:  Left breast cancer with axillary metastasis. PROCEDURE PERFORMED:  Left wire localized lumpectomy, left deep axillary sentinel lymph node biopsy. SURGEON:  Lopez Escobar MD    ASSISTANT:  Shaggy Banks. ANESTHESIA:  General.    COMPLICATIONS:  None. SPECIMENS REMOVED:  1. Left axillary sentinel node #1.  2.  Left axillary sentinel node #2. 3.  Left axillary sentinel node #3. 4.  Left axillary sentinel node #4. 5.  Left breast lumpectomy. 6.  Left breast medial margin. 7.  Left breast superior margin. 8.  Left breast lateral margin. 9.  Left breast inferior margin. 10.  Left beast anterior margin. 11.  Left breast deep margin. IMPLANTS:  None. ESTIMATED BLOOD LOSS:  Minimal.    DRAINS:  None. FINDINGS:  Wire and specimen removed, sentinel lymph node found with clip, three other nodes, sentinel lymph nodes removed negative. INDICATIONS FOR PROCEDURE:  A 47-year-old female with a left breast cancer with a known node positive metastasis. She underwent neoadjuvant chemotherapy and had a complete response on preoperative breast MRI. In the preop holding area, she was ultrasounded and the clip in the breast cannot be seen with ultrasound and therefore she underwent wire localization procedure in [facility] pre[facility]. PROCEDURE IN DETAIL:  The patient was seen in the preop holding area, where an ultrasound was performed, we cannot see the clip and therefore she was sent to [facility] for wire localization, which she tolerated well. She also went to Nuclear Medicine for injection of technetium preoperatively and tolerated this well.   She was taken to the operating room and laid in supine position, where general endotracheal anesthesia was induced. Left breast was prepped and draped in usual fashion and a time-out was performed. Attention was turned to left axilla, a 15-blade was used to make an axillary incision. Bovie cautery was used to dissect through the axillary fascia. Neoprobe was used to identify the first sentinel node, this was the hottest node and this was excised, sent for frozen section. Three other sentinel nodes were identified using Neoprobe guidance, excised with LigaSure, and sent for frozen section. The cavity was irrigated. Using the same incision being that the lesion was in the tail of the left outer breast, Bovie cautery was used to dissect towards the wire, [place]  scissors were used to dissect around the wire. The wire was pulled through. Further dissection was carried out with Metzenbaum scissors. At the point of dissection, I came across the area of the clip and noticed the area of the clip and the [facility] clip with a gelatinous material and the clip was visible. Therefore an additional section of tissue around this was excised and sent for [facility]. The area of the density and the wire were located in the specimen radiograph. The clip likely either was suctioned up in suction or was blotted with Ray-samantha pad. An additional shaved margins were taken for six total margins using sharp dissection and these were sent for permanent pathology. The cavity was irrigated. Hemostasis was obtained with Bovie cautery. [place] lymph node report revealed that all four nodes were negative of tumor and the sentinel node #1 was the clipped node. The deeper tissue was closed with interrupted 2-0 Vicryl, the axillary fascia was closed with interrupted 2-0 Vicryl, the deeper subcu tissue was closed with 3-0 Vicryl and the skin with interrupted 3-0 Vicryl, 4-0 subcuticular Monocryl. Skin glue was placed on the incision as well as dressing and a bra.   All sponge, needle and instrument counts were correct. The patient went to recovery in stable condition.         Patrick Bobo MD      [id number]  D:  07/09/2020 11:08  T:  07/09/2020 12:25  JOB #:  [id number]

## 2020-07-09 NOTE — DISCHARGE INSTRUCTIONS
Discharge Instructions from Dr. Tommie Key    · I will call you with the pathology results, typically within 1 week from today. · You may shower, but no hot tubs, swimming pools, or baths until your incision is healed. · No heavy lifting with the affected extremity (nothing greater than 5 pounds), and limit its use for the next 4-5 days. · You may use an ice pack for comfort for the next couple of days, but do not place ice directly on the skin. Rather, use a towel or clothing to serve as a barrier between skin and ice to prevent injury. · If I placed a drain, follow the drain instructions provided, especially as you keep a record of the drain output. · Follow medication instructions carefully. No aspirin, ibuprofen or aleve  · Wear surgical bra and dressing for 24 hours, then remove. Wear supportive bra at all times. · You will have bruising and swelling  · Watch for signs of infection as listed below. · Redness  · Swelling  · Drainage from the incision or from your nipple that appears infected  · Fever over 101.5 degrees for consecutive readings, or over 99.5 if you are currently undergoing chemotherapy. · Call our office (number is below) for a follow-up appointment. · If you have any problems, our phone number is [phone]    DO NOT TAKE TYLENOL/ACETAMINOPHEN WITH PERCOCET, [address], [address] TAKE NARCOTIC PAIN MEDICATIONS WITH FOOD     Narcotics tend to be constipating, we suggest taking a stool softener such as Colace or Miralax (follow package instructions). DO NOT DRIVE WHILE TAKING NARCOTIC PAIN MEDICATIONS. DO NOT TAKE SLEEPING MEDICATIONS OR ANTIANXIETY MEDICATIONS WHILE TAKING NARCOTIC PAIN MEDICATIONS,  ESPECIALLY THE NIGHT OF ANESTHESIA! CPAP PATIENTS BE SURE TO WEAR MACHINE WHENEVER NAPPING OR SLEEPING!     DISCHARGE SUMMARY from Nurse    The following personal items collected during your admission are returned to you:   Dental Appliance: Dental Appliances: None  Vision: Visual Aid: Glasses(PACU)  Hearing Aid:    Jewelry: Jewelry: None  Clothing: Clothing: With patient  Other Valuables: Other Valuables: Eyeglasses  Valuables sent to safe:        PATIENT INSTRUCTIONS:    After General Anesthesia or Intravenous Sedation, for 24 hours or while taking prescription Narcotics:        Someone should be with you for the next 24 hours. For your own safety, a responsible adult must drive you home. · Limit your activities  · Recommended activity: Rest today, up with assistance today. Do not climb stairs or shower unattended for the next 24 hours. · Please start with a soft bland diet and advance as tolerated (no nausea) to regular diet. · If you have a sore throat you should try the following: fluids, warm salt water gargles, or throat lozenges. If it does not improve after several days please follow up with your primary physician. · Do not drive and operate hazardous machinery  · Do not make important personal or business decisions  · Do  not drink alcoholic beverages  · If you have not urinated within 8 hours after discharge, please contact your surgeon on call. Report the following to your surgeon:  · Excessive pain, swelling, redness or odor of or around the surgical area  · Temperature over 100.5  · Nausea and vomiting lasting longer than 4 hours or if unable to take medications  · Any signs of decreased circulation or nerve impairment to extremity: change in color, persistent  numbness, tingling, coldness or increase pain      · You will receive a Post Operative Call from one of the Recovery Room Nurses on the day after your surgery to check on you. It is very important for us to know how you are recovering after your surgery. If you have an issue or need to speak with someone, please call your surgeon, do not wait for the post operative call.     · You may receive an e-mail or letter in the mail from [employer] regarding your experience with us in the Ambulatory Surgery Unit. Your feedback is valuable to us and we appreciate your participation in the survey. · If the above instructions are not adequate or you are having problems after your surgery, call the physician at their office number. · We wish you a speedy recovery ? What to do at Home:      *  Please give a list of your current medications to your Primary Care Provider. *  Please update this list whenever your medications are discontinued, doses are      changed, or new medications (including over-the-counter products) are added. *  Please carry medication information at all times in case of emergency situations. If you have not received your influenza and/or pneumococcal vaccine, please follow up with your primary care physician. The discharge information has been reviewed with the patient and caregiver. The patient and caregiver verbalized understanding. TO PREVENT AN INFECTION      1. [address] YOUR HANDS     To prevent infection, good handwashing is the most important thing you or your caregiver can do.  Wash your hands with soap and water or use the hand  we gave you before you touch any wounds. 2. SHOWER     Use the antibacterial soap we gave you when you take a shower.  Shower with this soap until your wounds are healed.  To reach all areas of your body, you may need someone to help you.  Dont forget to clean your belly button with every shower. 3.  USE CLEAN SHEETS     Use freshly cleaned sheets on your bed after surgery.  To keep the surgery site clean, do not allow pets to sleep with you while your wound is still healing. 4. STOP SMOKING     Stop smoking, or at least cut back on smoking     Smoking slows your healing. 5.  CONTROL YOUR BLOOD SUGAR     High blood sugars slow wound healing.  If you are diabetic, control your blood sugar levels before and after your surgery.      Patient Education        Learning About Coronavirus (COVID-19)  Coronavirus (COVID-19): Overview  What is coronavirus (MFDON-72)? The coronavirus disease (COVID-19) is caused by a virus. It is an illness that was [facility]. It has since spread worldwide. The virus can cause fever, cough, and trouble breathing. In severe cases, it can cause pneumonia and make it hard to breathe without help. It can cause death. Coronaviruses are a large group of viruses. They cause the common cold. They also cause more serious illnesses like Middle East respiratory syndrome (MERS) and severe acute respiratory syndrome (SARS). COVID-19 is caused by a novel coronavirus. That means it's a new type that has not been seen in people before. This virus spreads person-to-person through droplets from coughing and sneezing. It can also spread when you are close to someone who is infected. And it can spread when you touch something that has the virus on it, such as a doorknob or a tabletop. What can you do to protect yourself from coronavirus (COVID-19)? The best way to protect yourself from getting sick is to:  · Avoid areas where there is an outbreak. · Avoid contact with people who may be infected. · Wash your hands often with soap or alcohol-based hand sanitizers. · Avoid crowds and try to stay at least 6 feet away from other people. · Wash your hands often, especially after you cough or sneeze. Use soap and water, and scrub for at least 20 seconds. If soap and water aren't available, use an alcohol-based hand . · Avoid touching your mouth, nose, and eyes. What can you do to avoid spreading the virus to others? To help avoid spreading the virus to others:  · Cover your mouth with a tissue when you cough or sneeze. Then throw the tissue in the trash. · Use a disinfectant to clean things that you touch often. · Wear a cloth face cover if you have to go to public areas.   · Stay home if you are sick or have been exposed to the virus. Don't go to school, work, or public areas. And don't use public transportation, ride-shares, or taxis unless you have no choice. · If you are sick:  ? Leave your home only if you need to get medical care. But call the doctor's office first so they know you're coming. And wear a face cover. ? Wear the face cover whenever you're around other people. It can help stop the spread of the virus when you cough or sneeze. ? Clean and disinfect your home every day. Use household  and disinfectant wipes or sprays. Take special care to clean things that you grab with your hands. These include doorknobs, remote controls, phones, and handles on your refrigerator and microwave. And don't forget countertops, tabletops, bathrooms, and computer keyboards. When to call for help  [id number] anytime you think you may need emergency care. For example, call if:  · You have severe trouble breathing. (You can't talk at all.)  · You have constant chest pain or pressure. · You are severely dizzy or lightheaded. · You are confused or can't think clearly. · Your face and lips have a blue color. · You pass out (lose consciousness) or are very hard to wake up. Call your doctor now if you develop symptoms such as:  · Shortness of breath. · Fever. · Cough. If you need to get care, call ahead to the doctor's office for instructions before you go. Make sure you wear a face cover to prevent exposing other people to the virus. Where can you get the latest information? The following health organizations are tracking and studying this virus. Their websites contain the most up-to-date information. Daylene Grade also learn what to do if you think you may have been exposed to the virus. · U.S. Centers for Disease Control and Prevention (CDC): The CDC provides updated news about the disease and travel advice. The website also tells you how to prevent the spread of infection.  www.cdc.gov  · World Health Organization [facility]): WHO offers information about the virus outbreaks. WHO also has travel advice. www.who.int  Current as of: May 8, 2020               Content Version: 12.5  © 2006-2020 Healthwise, Incorporated. Care instructions adapted under license by [employer] (which disclaims liability or warranty for this information). If you have questions about a medical condition or this instruction, always ask your healthcare professional. [address] any warranty or liability for your use of this information.

## 2020-07-09 NOTE — PERIOP NOTES
Permission received to review discharge instructions and discuss private health information with Sharon Gay,  .

## 2020-07-09 NOTE — ANESTHESIA PREPROCEDURE EVALUATION
Relevant Problems   No relevant active problems       Anesthetic History   No history of anesthetic complications            Review of Systems / Medical History  Patient summary reviewed, nursing notes reviewed and pertinent labs reviewed    Pulmonary  Within defined limits                 Neuro/Psych         Psychiatric history     Cardiovascular  Within defined limits                Exercise tolerance: >4 METS  Comments: 05/20 ECHO= EF 50-55%, trace MR, mild TR   GI/Hepatic/Renal  Within defined limits              Endo/Other        Cancer (lft breast, s/p chemo) and anemia (d/t chemo)     Other Findings            Physical Exam    Airway  Mallampati: II  TM Distance: 4 - 6 cm  Neck ROM: normal range of motion   Mouth opening: Normal     Cardiovascular    Rhythm: regular  Rate: normal         Dental  No notable dental hx       Pulmonary  Breath sounds clear to auscultation               Abdominal  GI exam deferred       Other Findings            Anesthetic Plan    ASA: 2  Anesthesia type: general    Monitoring Plan: BIS      Induction: Intravenous  Anesthetic plan and risks discussed with: Patient

## 2020-07-09 NOTE — PERIOP NOTES
1116: Patient received to PACU, VSS. Patient anesthetized, oral airway in place. Incision site to left breast intact with no drainage noted. 1140: Patient anesthetized, VSS. Oral airway in place. 1152: Patient awake and alert, oral airway removed without issue. Patient has no complaints of pain. Patient tolerating liquids. 1200: Discharge instructions given to patient's  over the phone.  verbalized understanding of instructions and follow up appointment. Patient states ready for discharge. IV removed. Surgical bra in place, incision site intact. Ice pack given. RX sent to pharmacy by MD.     1812: Patient discharged at this time by wheelchair with belongings.  to provide transport home.

## 2020-07-10 ENCOUNTER — TELEPHONE (OUTPATIENT)
Dept: SURGERY | Age: 45
End: 2020-07-10

## 2020-07-13 NOTE — PROGRESS NOTES
Called with surg path  pcr  She was happy   Has appt with annetta Wednesday  Doing well  Will schedule actual postop with me

## 2020-07-17 RX ORDER — SODIUM CHLORIDE 9 MG/ML
10 INJECTION INTRAMUSCULAR; INTRAVENOUS; SUBCUTANEOUS AS NEEDED
Status: CANCELLED | OUTPATIENT
Start: 2020-08-07

## 2020-07-17 RX ORDER — HYDROCORTISONE SODIUM SUCCINATE 100 MG/2ML
100 INJECTION, POWDER, FOR SOLUTION INTRAMUSCULAR; INTRAVENOUS AS NEEDED
Status: CANCELLED | OUTPATIENT
Start: 2020-08-07

## 2020-07-17 RX ORDER — SODIUM CHLORIDE 9 MG/ML
25 INJECTION, SOLUTION INTRAVENOUS CONTINUOUS
Status: CANCELLED | OUTPATIENT
Start: 2020-08-07 | End: 2020-08-08

## 2020-07-17 RX ORDER — EPINEPHRINE 1 MG/ML
0.3 INJECTION, SOLUTION, CONCENTRATE INTRAVENOUS AS NEEDED
Status: CANCELLED | OUTPATIENT
Start: 2020-08-07

## 2020-07-17 RX ORDER — ONDANSETRON 2 MG/ML
8 INJECTION INTRAMUSCULAR; INTRAVENOUS AS NEEDED
Status: CANCELLED | OUTPATIENT
Start: 2020-08-07

## 2020-07-17 RX ORDER — SODIUM CHLORIDE 0.9 % (FLUSH) 0.9 %
10 SYRINGE (ML) INJECTION AS NEEDED
Status: CANCELLED
Start: 2020-08-07

## 2020-07-17 RX ORDER — HEPARIN 100 UNIT/ML
300-500 SYRINGE INTRAVENOUS AS NEEDED
Status: CANCELLED
Start: 2020-08-07

## 2020-07-17 RX ORDER — ACETAMINOPHEN 325 MG/1
650 TABLET ORAL
Status: CANCELLED | OUTPATIENT
Start: 2020-08-07

## 2020-07-17 RX ORDER — DIPHENHYDRAMINE HYDROCHLORIDE 50 MG/ML
50 INJECTION, SOLUTION INTRAMUSCULAR; INTRAVENOUS
Status: CANCELLED | OUTPATIENT
Start: 2020-08-07

## 2020-07-17 RX ORDER — DIPHENHYDRAMINE HYDROCHLORIDE 50 MG/ML
50 INJECTION, SOLUTION INTRAMUSCULAR; INTRAVENOUS AS NEEDED
Status: CANCELLED
Start: 2020-08-07

## 2020-07-17 RX ORDER — DIPHENHYDRAMINE HYDROCHLORIDE 50 MG/ML
25 INJECTION, SOLUTION INTRAMUSCULAR; INTRAVENOUS AS NEEDED
Status: CANCELLED
Start: 2020-08-07

## 2020-07-17 RX ORDER — ACETAMINOPHEN 325 MG/1
650 TABLET ORAL AS NEEDED
Status: CANCELLED
Start: 2020-08-07

## 2020-07-17 RX ORDER — ALBUTEROL SULFATE 0.83 MG/ML
2.5 SOLUTION RESPIRATORY (INHALATION) AS NEEDED
Status: CANCELLED
Start: 2020-08-07

## 2020-07-24 ENCOUNTER — APPOINTMENT (OUTPATIENT)
Dept: INFUSION THERAPY | Age: 45
End: 2020-07-24

## 2020-07-29 ENCOUNTER — OFFICE VISIT (OUTPATIENT)
Dept: SURGERY | Age: 45
End: 2020-07-29

## 2020-07-29 VITALS
BODY MASS INDEX: 27.16 KG/M2 | DIASTOLIC BLOOD PRESSURE: 75 MMHG | WEIGHT: 169 LBS | HEART RATE: 84 BPM | TEMPERATURE: 97.4 F | HEIGHT: 66 IN | SYSTOLIC BLOOD PRESSURE: 114 MMHG

## 2020-07-29 DIAGNOSIS — C50.412 MALIGNANT NEOPLASM OF UPPER-OUTER QUADRANT OF LEFT FEMALE BREAST, UNSPECIFIED ESTROGEN RECEPTOR STATUS (HCC): Primary | ICD-10-CM

## 2020-07-29 RX ORDER — ALBUTEROL SULFATE 0.83 MG/ML
2.5 SOLUTION RESPIRATORY (INHALATION) AS NEEDED
Status: CANCELLED
Start: 2020-08-28

## 2020-07-29 RX ORDER — ACETAMINOPHEN 325 MG/1
650 TABLET ORAL
Status: CANCELLED | OUTPATIENT
Start: 2020-08-28

## 2020-07-29 RX ORDER — EPINEPHRINE 1 MG/ML
0.3 INJECTION, SOLUTION, CONCENTRATE INTRAVENOUS AS NEEDED
Status: CANCELLED | OUTPATIENT
Start: 2020-08-28

## 2020-07-29 RX ORDER — HYDROCORTISONE SODIUM SUCCINATE 100 MG/2ML
100 INJECTION, POWDER, FOR SOLUTION INTRAMUSCULAR; INTRAVENOUS AS NEEDED
Status: CANCELLED | OUTPATIENT
Start: 2020-08-28

## 2020-07-29 RX ORDER — SODIUM CHLORIDE 0.9 % (FLUSH) 0.9 %
10 SYRINGE (ML) INJECTION AS NEEDED
Status: CANCELLED
Start: 2020-08-28

## 2020-07-29 RX ORDER — SODIUM CHLORIDE 9 MG/ML
10 INJECTION INTRAMUSCULAR; INTRAVENOUS; SUBCUTANEOUS AS NEEDED
Status: CANCELLED | OUTPATIENT
Start: 2020-08-28

## 2020-07-29 RX ORDER — HEPARIN 100 UNIT/ML
300-500 SYRINGE INTRAVENOUS AS NEEDED
Status: CANCELLED
Start: 2020-08-28

## 2020-07-29 RX ORDER — DIPHENHYDRAMINE HYDROCHLORIDE 50 MG/ML
25 INJECTION, SOLUTION INTRAMUSCULAR; INTRAVENOUS AS NEEDED
Status: CANCELLED
Start: 2020-08-28

## 2020-07-29 RX ORDER — ACETAMINOPHEN 325 MG/1
650 TABLET ORAL AS NEEDED
Status: CANCELLED
Start: 2020-08-28

## 2020-07-29 RX ORDER — DIPHENHYDRAMINE HYDROCHLORIDE 50 MG/ML
50 INJECTION, SOLUTION INTRAMUSCULAR; INTRAVENOUS AS NEEDED
Status: CANCELLED
Start: 2020-08-28

## 2020-07-29 RX ORDER — ONDANSETRON 2 MG/ML
8 INJECTION INTRAMUSCULAR; INTRAVENOUS AS NEEDED
Status: CANCELLED | OUTPATIENT
Start: 2020-08-28

## 2020-07-29 RX ORDER — DIPHENHYDRAMINE HYDROCHLORIDE 50 MG/ML
50 INJECTION, SOLUTION INTRAMUSCULAR; INTRAVENOUS
Status: CANCELLED | OUTPATIENT
Start: 2020-08-28

## 2020-07-29 RX ORDER — SODIUM CHLORIDE 9 MG/ML
25 INJECTION, SOLUTION INTRAVENOUS CONTINUOUS
Status: CANCELLED | OUTPATIENT
Start: 2020-08-28 | End: 2020-08-29

## 2020-07-29 NOTE — PATIENT INSTRUCTIONS
Eating Healthy Foods: Care Instructions  Your Care Instructions     Eating healthy foods can help lower your risk for disease. Healthy food gives you energy and keeps your heart strong, your brain active, your muscles working, and your bones strong. A healthy diet includes a variety of foods from the basic food groups: grains, vegetables, fruits, milk and milk products, and meat and beans. Some people may eat more of their favorite foods from only one food group and, as a result, miss getting the nutrients they need. So, it is important to pay attention not only to what you eat but also to what you are missing from your diet. You can eat a healthy, balanced diet by making a few small changes. Follow-up care is a key part of your treatment and safety. Be sure to make and go to all appointments, and call your doctor if you are having problems. It's also a good idea to know your test results and keep a list of the medicines you take. How can you care for yourself at home? Look at what you eat  · Keep a food diary for a week or two and record everything you eat or drink. Track the number of servings you eat from each food group. · For a balanced diet every day, eat a variety of:  ? 6 or more ounce-equivalents of grains, such as cereals, breads, crackers, rice, or pasta, every day. An ounce-equivalent is 1 slice of bread, 1 cup of ready-to-eat cereal, or ½ cup of cooked rice, cooked pasta, or cooked cereal.  ? 2½ cups of vegetables, especially:  § Dark-green vegetables such as broccoli and spinach. § Orange vegetables such as carrots and sweet potatoes. § Dry beans (such as calles and kidney beans) and peas (such as lentils). ? 2 cups of fresh, frozen, or canned fruit. A small apple or 1 banana or orange equals 1 cup. ? 3 cups of nonfat or low-fat milk, yogurt, or other milk products. ? 5½ ounces of meat and beans, such as chicken, fish, lean meat, beans, nuts, and seeds.  One egg, 1 tablespoon of peanut butter, ½ ounce nuts or seeds, or ¼ cup of cooked beans equals 1 ounce of meat. · Learn how to read food labels for serving sizes and ingredients. Fast-food and convenience-food meals often contain few or no fruits or vegetables. Make sure you eat some fruits and vegetables to make the meal more nutritious. · Look at your food diary. For each food group, add up what you have eaten and then divide the total by the number of days. This will give you an idea of how much you are eating from each food group. See if you can find some ways to change your diet to make it more healthy. Start small  · Do not try to make dramatic changes to your diet all at once. You might feel that you are missing out on your favorite foods and then be more likely to fail. · Start slowly, and gradually change your habits. Try some of the following:  ? Use whole wheat bread instead of white bread. ? Use nonfat or low-fat milk instead of whole milk. ? Eat brown rice instead of white rice, and eat whole wheat pasta instead of white-flour pasta. ? Try low-fat cheeses and low-fat yogurt. ? Add more fruits and vegetables to meals and have them for snacks. ? Add lettuce, tomato, cucumber, and onion to sandwiches. ? Add fruit to yogurt and cereal.  Enjoy food  · You can still eat your favorite foods. You just may need to eat less of them. If your favorite foods are high in fat, salt, and sugar, limit how often you eat them, but do not cut them out entirely. · Eat a wide variety of foods. Make healthy choices when eating out  · The type of restaurant you choose can help you make healthy choices. Even fast-food chains are now offering more low-fat or healthier choices on the menu. · Choose smaller portions, or take half of your meal home. · When eating out, try:  ? A veggie pizza with a whole wheat crust or grilled chicken (instead of sausage or pepperoni).   ? Pasta with roasted vegetables, grilled chicken, or marinara sauce instead of cream sauce. ? A vegetable wrap or grilled chicken wrap. ? Broiled or poached food instead of fried or breaded items. Make healthy choices easy  · Buy packaged, prewashed, ready-to-eat fresh vegetables and fruits, such as baby carrots, salad mixes, and chopped or shredded broccoli and cauliflower. · Buy packaged, presliced fruits, such as melon or pineapple. · Choose 100% fruit or vegetable juice instead of soda. Limit juice intake to 4 to 6 oz (½ to ¾ cup) a day. · Blend low-fat yogurt, fruit juice, and canned or frozen fruit to make a smoothie for breakfast or a snack. Where can you learn more? Go to http://jian-darline.info/  Enter T756 in the search box to learn more about \"Eating Healthy Foods: Care Instructions. \"  Current as of: August 22, 2019               Content Version: 12.5  © 6237-5789 Healthwise, Incorporated. Care instructions adapted under license by [employer] (which disclaims liability or warranty for this information). If you have questions about a medical condition or this instruction, always ask your healthcare professional. [place] [address] any warranty or liability for your use of this information.

## 2020-07-29 NOTE — PROGRESS NOTES
HISTORY OF PRESENT ILLNESS  Ada Gomez is a 50 y.o. female. HPI ESTABLISHED patient here for follow up LEFT breast cancer. No breast problems at this time. She has met with Dr. Alessia Morse.     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 Anitra Dawn. Discussed neoadjuvant chemotherapy ([facility] + P)  2/24/2020 - RIGHT breast MRI guided biopsy. Benign. 2/25/2020 - port insertion in IR  2/28/2020 - started neoadjuvant [facility]-P - Dr. Anitra Dawn  7/9/2020 - LEFT breast lumpectomy and LEFT SLNB. PCR.     No family history of breast or ovarian cancer. The patient is genetic test negative Renata Bales Breast Next 2/2020).    Imaging-  2/12/2020 - breast mri  2/7/2020 - bone scans and CT scans. Showed benign fibroid uterus, O/w clear  1/28/2020 - LEFT breast diagnostic mammogram and LEFT breast US    Review of Systems   All other systems reviewed and are negative. Physical Exam  Vitals signs and nursing note reviewed.    Chest:          Comments: Left axillary incision healing well  No seroma  Port intact         ASSESSMENT and PLAN    ICD-10-CM ICD-9-CM    1. Malignant neoplasm of upper-outer quadrant of left female breast, unspecified estrogen receptor status (HCC)  C50.412 174.4      - healing well  pcr on surg path  Continue HP  Ok to start xrt

## 2020-07-29 NOTE — LETTER
7/29/20 Patient: Hosea Stafford YOB: 1975 Date of Visit: 7/29/2020 Toya Sanchez MD 
[address] [facility] [address] [address] VIA Facsimile: [phone] Dear Toya Sanchez MD, Thank you for referring Ms. Gabi Ríos to [address] MAIN OFFICE [address] [place] [address] for evaluation. My notes for this consultation are attached. If you have questions, please do not hesitate to call me. I look forward to following your patient along with you. Sincerely, Stefanie Puckett MD

## 2020-07-31 ENCOUNTER — APPOINTMENT (OUTPATIENT)
Dept: INFUSION THERAPY | Age: 45
End: 2020-07-31

## 2020-08-07 ENCOUNTER — OFFICE VISIT (OUTPATIENT)
Dept: ONCOLOGY | Age: 45
End: 2020-08-07
Payer: COMMERCIAL

## 2020-08-07 ENCOUNTER — HOSPITAL ENCOUNTER (OUTPATIENT)
Dept: INFUSION THERAPY | Age: 45
Discharge: HOME | End: 2020-08-07
Payer: COMMERCIAL

## 2020-08-07 VITALS
WEIGHT: 169.4 LBS | OXYGEN SATURATION: 99 % | HEIGHT: 66 IN | BODY MASS INDEX: 27.23 KG/M2 | SYSTOLIC BLOOD PRESSURE: 103 MMHG | DIASTOLIC BLOOD PRESSURE: 65 MMHG | TEMPERATURE: 97 F | HEART RATE: 78 BPM

## 2020-08-07 VITALS
HEIGHT: 66 IN | BODY MASS INDEX: 27.16 KG/M2 | WEIGHT: 169 LBS | OXYGEN SATURATION: 99 % | SYSTOLIC BLOOD PRESSURE: 103 MMHG | HEART RATE: 78 BPM | TEMPERATURE: 97 F | DIASTOLIC BLOOD PRESSURE: 65 MMHG

## 2020-08-07 DIAGNOSIS — C50.912 MALIGNANT NEOPLASM OF LEFT BREAST IN FEMALE, ESTROGEN RECEPTOR NEGATIVE, UNSPECIFIED SITE OF BREAST (HCC): Primary | ICD-10-CM

## 2020-08-07 DIAGNOSIS — Z17.1 MALIGNANT NEOPLASM OF UPPER-OUTER QUADRANT OF LEFT BREAST IN FEMALE, ESTROGEN RECEPTOR NEGATIVE (HCC): Primary | ICD-10-CM

## 2020-08-07 DIAGNOSIS — Z17.1 MALIGNANT NEOPLASM OF LEFT BREAST IN FEMALE, ESTROGEN RECEPTOR NEGATIVE, UNSPECIFIED SITE OF BREAST (HCC): Primary | ICD-10-CM

## 2020-08-07 DIAGNOSIS — C50.412 MALIGNANT NEOPLASM OF UPPER-OUTER QUADRANT OF LEFT BREAST IN FEMALE, ESTROGEN RECEPTOR NEGATIVE (HCC): Primary | ICD-10-CM

## 2020-08-07 PROCEDURE — 99215 OFFICE O/P EST HI 40 MIN: CPT | Performed by: INTERNAL MEDICINE

## 2020-08-07 PROCEDURE — 96413 CHEMO IV INFUSION 1 HR: CPT

## 2020-08-07 PROCEDURE — 77030012965 HC NDL HUBR BBMI -A

## 2020-08-07 PROCEDURE — 74011250636 HC RX REV CODE- 250/636: Performed by: INTERNAL MEDICINE

## 2020-08-07 PROCEDURE — 96417 CHEMO IV INFUS EACH ADDL SEQ: CPT

## 2020-08-07 RX ORDER — SODIUM CHLORIDE 9 MG/ML
25 INJECTION, SOLUTION INTRAVENOUS CONTINUOUS
Status: DISPENSED | OUTPATIENT
Start: 2020-08-07 | End: 2020-08-07

## 2020-08-07 RX ORDER — SODIUM CHLORIDE 9 MG/ML
10 INJECTION INTRAMUSCULAR; INTRAVENOUS; SUBCUTANEOUS AS NEEDED
Status: ACTIVE | OUTPATIENT
Start: 2020-08-07 | End: 2020-08-07

## 2020-08-07 RX ORDER — HEPARIN 100 UNIT/ML
300-500 SYRINGE INTRAVENOUS AS NEEDED
Status: ACTIVE | OUTPATIENT
Start: 2020-08-07 | End: 2020-08-07

## 2020-08-07 RX ORDER — SODIUM CHLORIDE 0.9 % (FLUSH) 0.9 %
10 SYRINGE (ML) INJECTION AS NEEDED
Status: DISPENSED | OUTPATIENT
Start: 2020-08-07 | End: 2020-08-07

## 2020-08-07 RX ADMIN — SODIUM CHLORIDE 25 ML/HR: 900 INJECTION, SOLUTION INTRAVENOUS at 11:07

## 2020-08-07 RX ADMIN — Medication 500 UNITS: at 13:55

## 2020-08-07 RX ADMIN — PERTUZUMAB 840 MG: 30 INJECTION, SOLUTION, CONCENTRATE INTRAVENOUS at 13:00

## 2020-08-07 RX ADMIN — Medication 40 ML: at 13:55

## 2020-08-07 RX ADMIN — SODIUM CHLORIDE 614 MG: 9 INJECTION, SOLUTION INTRAVENOUS at 11:12

## 2020-08-07 NOTE — PROGRESS NOTES
Tyler Pena is a 50 y.o. female here for follow up for:  Chief Complaint   Patient presents with    Breast Cancer     Left    Chemotherapy   Cycle 7 Day 1 TCH&P    1. Have you been to the ER, urgent care clinic since your last visit? Hospitalized since your last visit? 7/9 left lumpectomy    2. Have you seen or consulted any other health care providers outside of the [address] [place] [address] since your last visit? Include any pap smears or colon screening. Naseem Nguyen started radiation Tuesday. Pt states everything has been fine. Surgery went well and started radiation this past Tuesday.

## 2020-08-07 NOTE — LETTER
8/7/20 Patient: Flavio Goodrich YOB: 1975 Date of Visit: 8/7/2020 Ingrid Rojas MD 
[address] [facility] [address] [address] VIA Facsimile: [phone] Dear Ingrid Rojas MD, Thank you for referring Ms. Yosvany Prieto to [address] [place] [address] for evaluation. My notes for this consultation are attached. If you have questions, please do not hesitate to call me. I look forward to following your patient along with you.  
 
 
Sincerely, 
 
Alfredo Butler NP

## 2020-08-07 NOTE — PROGRESS NOTES
[address], [address] [place] [address] Derek Samuel, 200 [facility]  [phone]      Oncology Progress note      Patient: Dietrich Merlin MRN: [id number]  SSN: [id number]    YOB: 1975  Age: 50 y.o. Sex: female        Diagnosis:     1. Left breast carcinoma: Dx: 1/31/2020  T2 N1 M0 (Stage IIA) Invasive ductal carcinoma, Tumor size 2 cm, grade 3, ER 0%, IA 0%, Her 2 3+     ypT0 ypN0 - pCR    Treatment:     1. Neoadjuvant chemotherapy   Docetaxel, Carboplatin, Trastuzumab + Pertuzumab - s/p 6 cycles  2. Left breast lumpectomy 07/09/2020  2. Adjuvant chemotherapy   Trastuzumab, Pertuzumab - Cycle 1 of 11    Subjective:      Dietrich Merlin is a 50 y.o. female who I am seeing in follow up for a diagnosis of left sided invasive breast carcinoma. She felt a lump in her left breast. Dr. Robson Lima obtained a diagnostic mammogram. The mammogram showed an abnormality in the upper quadrant of the left breast. A biopsy of the lesion revealed a ER 0% IA 0% and Her 2 3+ Gr 3 IDC of the left breast.     Ms. Colt Estrella completed neoadjuvant chemotherapy and underwent left breast lumpectomy on 7/9/2020. She had a pCR and is here today to start adjuvant herceptin and perjeta. She has recovered well from surgery and started radiation on 8/4/2020.       Review of Systems:    Constitutional: negative  Eyes: negative  Ears, Nose, Mouth, Throat, and Face: negative  Respiratory: negative  Cardiovascular: negative  Gastrointestinal: negative  Genitourinary:negative  Integument/Breast: negative  Hematologic/Lymphatic: negative  Musculoskeletal:negative  Neurological: negative      Past Medical History:   Diagnosis Date    Anxiety and depression     Cancer ([address] 01/2020    left breast ca er/pr (-) her 2 (+)     Past Surgical History:   Procedure Laterality Date    HX BREAST BIOPSY Left 07/24/2018    Left sebaceous cyst surgically removed    HX BREAST LUMPECTOMY Left 7/9/2020    LEFT BREAST WIRE-LOCALIZED LUMPECTOMY AND LEFT BREAST SENTINEL NODE BIOPSY performed by Maryse Rosenberg MD at [facility]    HX CYST INCISION AND DRAINAGE Left 20+ yrs ago    HX HEENT      tonsillectomy    HX VASCULAR ACCESS Right 02/25/2020    right chest area    IR INSERT TUNL CVC W PORT OVER 5 YEARS  2/25/2020      Family History   Problem Relation Age of Onset    Hypertension Mother     Kidney Disease Mother     Thyroid Disease Mother      Social History     Tobacco Use    Smoking status: Never Smoker    Smokeless tobacco: Never Used   Substance Use Topics    Alcohol use: No      Prior to Admission medications    Medication Sig Start Date End Date Taking? Authorizing Provider   naloxone [facility]) 4 mg/actuation nasal spray Use 1 spray intranasally, then discard. Repeat with new spray every 2 min as needed for opioid overdose symptoms, alternating nostrils. 7/9/20   Maryse Rosenberg MD   ethinyl estradiol-etonogestrel (NuvaRing) 0.12-0.015 mg/24 hr vaginal ring by Intravaginal route. Provider, Historical   ondansetron (ZOFRAN ODT) 4 mg disintegrating tablet Take 1 Tab by mouth every eight (8) hours as needed for Nausea or Vomiting. 4/15/20   Randeen Bosworth, NP   prochlorperazine (COMPAZINE) 10 mg tablet Take 0.5 Tabs by mouth every six (6) hours as needed for Nausea for up to 30 doses. 2/11/20   Randeen Bosworth, NP   diphenoxylate-atropine (LOMOTIL) 2.5-0.025 mg per tablet Take 1 Tab by mouth four (4) times daily as needed for Diarrhea. Max Daily Amount: 4 Tabs. 2/11/20   Randeen Bosworth, NP   lidocaine-prilocaine (EMLA) topical cream Apply  to affected area as needed for Pain. 2/11/20   Memo Marquez NP   dexAMETHasone (DECADRON) 4 mg tablet Take 8 mg by mouth two (2) times daily (with meals). The day of chemotherapy and the day after chemotherapy.  2/11/20   Randeen Bosworth, NP   VIIBRYD 40 mg tab tablet TAKE 1 TABLET BY MOUTH EVERY DAY 6/19/18   Provider, Historical No Known Allergies      Objective:     Visit Vitals  /65   Pulse 78   Temp 97 °F (36.1 °C)   Ht 5' 6\" (1.676 m)   Wt 169 lb (76.7 kg)   LMP 07/08/2020   SpO2 99%   BMI 27.28 kg/m²       Pain Scale: 0 - No pain/10  Pain Location:       Physical Exam:    GENERAL: alert, cooperative, no distress, appears stated age  EYE: conjunctivae/corneas clear. PERRL, EOM's intact  LYMPHATIC: Cervical, supraclavicular, and axillary nodes normal.   THROAT & NECK: normal and no erythema or exudates noted. LUNG: clear to auscultation bilaterally  HEART: regular rate and rhythm, S1, S2 normal, no murmur, click, rub or gallop  ABDOMEN: soft, non-tender. Bowel sounds normal. No masses,  no organomegaly  EXTREMITIES:  extremities normal, atraumatic, no cyanosis or edema  SKIN: Normal.  NEUROLOGIC: AOx3. Gait normal. Reflexes and motor strength normal and symmetric. Cranial nerves 2-12 and sensation grossly intact. Lab Results   Component Value Date/Time    WBC 3.7 06/12/2020 09:15 AM    HGB 10.4 (L) 06/12/2020 09:15 AM    HCT 30.7 (L) 06/12/2020 09:15 AM    PLATELET 191 60/44/1093 09:15 AM    MCV 99.4 (H) 06/12/2020 09:15 AM       Lab Results   Component Value Date/Time    Sodium 142 06/12/2020 09:15 AM    Potassium 3.5 06/12/2020 09:15 AM    Chloride 110 (H) 06/12/2020 09:15 AM    CO2 25 06/12/2020 09:15 AM    Anion gap 7 06/12/2020 09:15 AM    Glucose 103 (H) 06/12/2020 09:15 AM    BUN 17 06/12/2020 09:15 AM    Creatinine 0.88 06/12/2020 09:15 AM    BUN/Creatinine ratio 19 06/12/2020 09:15 AM    GFR est AA >60 06/12/2020 09:15 AM    GFR est non-AA >60 06/12/2020 09:15 AM    Calcium 8.3 (L) 06/12/2020 09:15 AM    Bilirubin, total 0.4 06/12/2020 09:15 AM    Alk.  phosphatase 101 06/12/2020 09:15 AM    Protein, total 6.1 (L) 06/12/2020 09:15 AM    Albumin 2.9 (L) 06/12/2020 09:15 AM    Globulin 3.2 06/12/2020 09:15 AM    A-G Ratio 0.9 (L) 06/12/2020 09:15 AM    ALT (SGPT) 49 06/12/2020 09:15 AM           Assessment: 1. Left breast carcinoma:  T2 N1 M0 (Stage IIA) Invasive ductal carcinoma, Tumor size 2 cm, grade 3, ER 0%, DE 0%, Her 2 3+       ypT0 ypN0 - pCR    ECOG PS 0  Intent of Treatment - curative  Prognosis: excellent    LVEF (5/12/2020) - 50-55%    Completed neoadjuvant chemotherapy   Docetaxel, Carboplatin, Trastuzumab + Pertuzumab - s/p 6 cycles    S/p left breast lumpectomy and left SLNB on 7/9/2020  Achieved pCR    Adjuvant radiation on clinical trial - started 8/4/2020    Starting adjuvant chemotherapy   Trastuzumab, Pertuzumab - Cycle 1 of 11    Reviewed the expected side effects of treatment and ways to manage them. Blood counts acceptable. Results reviewed with patient. Plan:       · Proceed with adjuvant herceptin/perjeta  · Continue adjuvant radiation  · Plan to repeat echocardiogram in mid September  · Follow-up in 6 weeks      I saw the patient in conjunction with Ava Oliveira NP      Signed by: Oliverio Meraz MD                     August 7, 2020        CC. Rich Lopes MD  CC.  Kush Somers MD

## 2020-08-07 NOTE — PROGRESS NOTES
[facility] - Chemotherapy Progress Note    6132 - Pt admit to [facility] for C7 Trazimera/Perjeta (reload) in stable condition. Assessment completed, pt reports no new concerns. R chest PAC accessed with 0.75\" Too Spark with positive blood return. Curos Cap applied to end clave. Chemotherapy Flowsheet 8/7/2020   Cycle C7   Date 8/7/2020   Drug / Regimen Trazimera/Perjeta   Pre Meds -   Notes echo 5/12/20 EF 50-55%       Visit Vitals  /65 (BP 1 Location: Left arm, BP Patient Position: Sitting)   Pulse 78   Temp 97 °F (36.1 °C)   Ht 5' 6\" (1.676 m)   Wt 76.8 kg (169 lb 6.4 oz)   SpO2 99%   Breastfeeding No   BMI 27.34 kg/m²       Medications:  NS KVO  Trazimera 616mg over 90min  Perjeta 840mg over 30min    Pt tolerated treatment well. Port maintained positive blood return throughout treatment, flushed with positive blood return at conclusion, and de-accessed. 1300 - D/c home in no distress. Pt aware of next [facility] appointment scheduled for: 8/28/2020 1500.      Future Appointments   Date Time Provider Viri Gonzales   8/7/2020  9:30 AM [place] [facility] NURSE 4 [address] REG   8/7/2020  2:30 PM Godfrey Rich NP ONCMR BS AMB   8/28/2020  3:00 PM CHAIR [address] REG   9/18/2020  3:00 PM CHAIR 2 [address] REG   1/29/2021  8:00 AM Edwige Nunez MD VBC BS AMB

## 2020-08-14 ENCOUNTER — APPOINTMENT (OUTPATIENT)
Dept: INFUSION THERAPY | Age: 45
End: 2020-08-14

## 2020-08-14 DIAGNOSIS — R19.7 DIARRHEA, UNSPECIFIED TYPE: ICD-10-CM

## 2020-08-14 DIAGNOSIS — Z17.1 MALIGNANT NEOPLASM OF UPPER-OUTER QUADRANT OF LEFT BREAST IN FEMALE, ESTROGEN RECEPTOR NEGATIVE (HCC): ICD-10-CM

## 2020-08-14 DIAGNOSIS — C50.412 MALIGNANT NEOPLASM OF UPPER-OUTER QUADRANT OF LEFT BREAST IN FEMALE, ESTROGEN RECEPTOR NEGATIVE (HCC): ICD-10-CM

## 2020-08-17 RX ORDER — DIPHENOXYLATE HYDROCHLORIDE AND ATROPINE SULFATE 2.5; .025 MG/1; MG/1
TABLET ORAL
Qty: 30 TAB | Refills: 1 | Status: SHIPPED | OUTPATIENT
Start: 2020-08-17

## 2020-08-21 ENCOUNTER — APPOINTMENT (OUTPATIENT)
Dept: INFUSION THERAPY | Age: 45
End: 2020-08-21

## 2020-08-28 ENCOUNTER — HOSPITAL ENCOUNTER (OUTPATIENT)
Dept: INFUSION THERAPY | Age: 45
Discharge: HOME | End: 2020-08-28
Payer: COMMERCIAL

## 2020-08-28 VITALS
WEIGHT: 167.9 LBS | DIASTOLIC BLOOD PRESSURE: 61 MMHG | OXYGEN SATURATION: 100 % | HEART RATE: 62 BPM | BODY MASS INDEX: 26.98 KG/M2 | TEMPERATURE: 98.5 F | SYSTOLIC BLOOD PRESSURE: 103 MMHG | HEIGHT: 66 IN | RESPIRATION RATE: 18 BRPM

## 2020-08-28 DIAGNOSIS — Z17.1 MALIGNANT NEOPLASM OF LEFT BREAST IN FEMALE, ESTROGEN RECEPTOR NEGATIVE, UNSPECIFIED SITE OF BREAST (HCC): Primary | ICD-10-CM

## 2020-08-28 DIAGNOSIS — C50.912 MALIGNANT NEOPLASM OF LEFT BREAST IN FEMALE, ESTROGEN RECEPTOR NEGATIVE, UNSPECIFIED SITE OF BREAST (HCC): Primary | ICD-10-CM

## 2020-08-28 LAB
ALBUMIN SERPL-MCNC: 3.2 G/DL (ref 3.5–5)
ALBUMIN/GLOB SERPL: 0.9 {RATIO} (ref 1.1–2.2)
ALP SERPL-CCNC: 76 U/L (ref 45–117)
ALT SERPL-CCNC: 20 U/L (ref 12–78)
ANION GAP SERPL CALC-SCNC: 6 MMOL/L (ref 5–15)
AST SERPL-CCNC: 13 U/L (ref 15–37)
BILIRUB SERPL-MCNC: 0.3 MG/DL (ref 0.2–1)
BUN SERPL-MCNC: 22 MG/DL (ref 6–20)
BUN/CREAT SERPL: 23 (ref 12–20)
CALCIUM SERPL-MCNC: 8.7 MG/DL (ref 8.5–10.1)
CHLORIDE SERPL-SCNC: 108 MMOL/L (ref 97–108)
CO2 SERPL-SCNC: 25 MMOL/L (ref 21–32)
CREAT SERPL-MCNC: 0.94 MG/DL (ref 0.55–1.02)
GLOBULIN SER CALC-MCNC: 3.5 G/DL (ref 2–4)
GLUCOSE SERPL-MCNC: 92 MG/DL (ref 65–100)
MAGNESIUM SERPL-MCNC: 1.8 MG/DL (ref 1.6–2.4)
PHOSPHATE SERPL-MCNC: 3.5 MG/DL (ref 2.6–4.7)
POTASSIUM SERPL-SCNC: 3.5 MMOL/L (ref 3.5–5.1)
PROT SERPL-MCNC: 6.7 G/DL (ref 6.4–8.2)
SODIUM SERPL-SCNC: 139 MMOL/L (ref 136–145)

## 2020-08-28 PROCEDURE — 74011250636 HC RX REV CODE- 250/636: Performed by: INTERNAL MEDICINE

## 2020-08-28 PROCEDURE — 77030012965 HC NDL HUBR BBMI -A

## 2020-08-28 PROCEDURE — 84100 ASSAY OF PHOSPHORUS: CPT

## 2020-08-28 PROCEDURE — 74011250636 HC RX REV CODE- 250/636: Performed by: NURSE PRACTITIONER

## 2020-08-28 PROCEDURE — 36415 COLL VENOUS BLD VENIPUNCTURE: CPT

## 2020-08-28 PROCEDURE — 96413 CHEMO IV INFUSION 1 HR: CPT

## 2020-08-28 PROCEDURE — 80053 COMPREHEN METABOLIC PANEL: CPT

## 2020-08-28 PROCEDURE — 83735 ASSAY OF MAGNESIUM: CPT

## 2020-08-28 PROCEDURE — 96417 CHEMO IV INFUS EACH ADDL SEQ: CPT

## 2020-08-28 RX ORDER — SODIUM CHLORIDE 9 MG/ML
25 INJECTION, SOLUTION INTRAVENOUS CONTINUOUS
Status: DISCONTINUED | OUTPATIENT
Start: 2020-08-28 | End: 2020-08-29 | Stop reason: HOSPADM

## 2020-08-28 RX ORDER — SODIUM CHLORIDE 0.9 % (FLUSH) 0.9 %
10 SYRINGE (ML) INJECTION AS NEEDED
Status: DISCONTINUED | OUTPATIENT
Start: 2020-08-28 | End: 2020-08-29 | Stop reason: HOSPADM

## 2020-08-28 RX ORDER — SODIUM CHLORIDE 9 MG/ML
10 INJECTION INTRAMUSCULAR; INTRAVENOUS; SUBCUTANEOUS AS NEEDED
Status: DISCONTINUED | OUTPATIENT
Start: 2020-08-28 | End: 2020-08-29 | Stop reason: HOSPADM

## 2020-08-28 RX ORDER — HEPARIN 100 UNIT/ML
300-500 SYRINGE INTRAVENOUS AS NEEDED
Status: DISCONTINUED | OUTPATIENT
Start: 2020-08-28 | End: 2020-08-29 | Stop reason: HOSPADM

## 2020-08-28 RX ADMIN — Medication 500 UNITS: at 15:48

## 2020-08-28 RX ADMIN — SODIUM CHLORIDE 460 MG: 900 INJECTION, SOLUTION INTRAVENOUS at 15:07

## 2020-08-28 RX ADMIN — SODIUM CHLORIDE 1000 ML: 900 INJECTION, SOLUTION INTRAVENOUS at 14:29

## 2020-08-28 RX ADMIN — SODIUM CHLORIDE 25 ML/HR: 900 INJECTION, SOLUTION INTRAVENOUS at 16:00

## 2020-08-28 RX ADMIN — Medication 50 ML: at 15:48

## 2020-08-28 RX ADMIN — PERTUZUMAB 420 MG: 30 INJECTION, SOLUTION, CONCENTRATE INTRAVENOUS at 15:46

## 2020-09-11 ENCOUNTER — APPOINTMENT (OUTPATIENT)
Dept: INFUSION THERAPY | Age: 45
End: 2020-09-11

## 2020-09-14 RX ORDER — ACETAMINOPHEN 325 MG/1
650 TABLET ORAL AS NEEDED
Status: CANCELLED
Start: 2020-09-18

## 2020-09-14 RX ORDER — ACETAMINOPHEN 325 MG/1
650 TABLET ORAL
Status: CANCELLED | OUTPATIENT
Start: 2020-09-18

## 2020-09-14 RX ORDER — ALBUTEROL SULFATE 0.83 MG/ML
2.5 SOLUTION RESPIRATORY (INHALATION) AS NEEDED
Status: CANCELLED
Start: 2020-09-18

## 2020-09-14 RX ORDER — EPINEPHRINE 1 MG/ML
0.3 INJECTION, SOLUTION, CONCENTRATE INTRAVENOUS AS NEEDED
Status: CANCELLED | OUTPATIENT
Start: 2020-09-18

## 2020-09-14 RX ORDER — DIPHENHYDRAMINE HYDROCHLORIDE 50 MG/ML
50 INJECTION, SOLUTION INTRAMUSCULAR; INTRAVENOUS
Status: CANCELLED | OUTPATIENT
Start: 2020-09-18

## 2020-09-14 RX ORDER — ONDANSETRON 2 MG/ML
8 INJECTION INTRAMUSCULAR; INTRAVENOUS AS NEEDED
Status: CANCELLED | OUTPATIENT
Start: 2020-09-18

## 2020-09-14 RX ORDER — HYDROCORTISONE SODIUM SUCCINATE 100 MG/2ML
100 INJECTION, POWDER, FOR SOLUTION INTRAMUSCULAR; INTRAVENOUS AS NEEDED
Status: CANCELLED | OUTPATIENT
Start: 2020-09-18

## 2020-09-14 RX ORDER — DIPHENHYDRAMINE HYDROCHLORIDE 50 MG/ML
50 INJECTION, SOLUTION INTRAMUSCULAR; INTRAVENOUS AS NEEDED
Status: CANCELLED
Start: 2020-09-18

## 2020-09-14 RX ORDER — DIPHENHYDRAMINE HYDROCHLORIDE 50 MG/ML
25 INJECTION, SOLUTION INTRAMUSCULAR; INTRAVENOUS AS NEEDED
Status: CANCELLED
Start: 2020-09-18

## 2020-09-17 DIAGNOSIS — Z51.81 ENCOUNTER FOR MONITORING CARDIOTOXIC DRUG THERAPY: Primary | ICD-10-CM

## 2020-09-17 DIAGNOSIS — Z79.899 ENCOUNTER FOR MONITORING CARDIOTOXIC DRUG THERAPY: Primary | ICD-10-CM

## 2020-09-17 NOTE — PROGRESS NOTES
PER PAULIE from Dr. Bernadette Robbins 2D ECHO LIMITED OR FOLLOW UP ADULT TTE W OR WO CONTRAST as a one time order.

## 2020-09-18 ENCOUNTER — OFFICE VISIT (OUTPATIENT)
Dept: ONCOLOGY | Age: 45
End: 2020-09-18
Payer: COMMERCIAL

## 2020-09-18 ENCOUNTER — HOSPITAL ENCOUNTER (OUTPATIENT)
Dept: NON INVASIVE DIAGNOSTICS | Age: 45
Discharge: HOME | End: 2020-09-18
Attending: INTERNAL MEDICINE
Payer: COMMERCIAL

## 2020-09-18 ENCOUNTER — HOSPITAL ENCOUNTER (OUTPATIENT)
Dept: INFUSION THERAPY | Age: 45
Discharge: HOME | End: 2020-09-18
Payer: COMMERCIAL

## 2020-09-18 VITALS
SYSTOLIC BLOOD PRESSURE: 120 MMHG | WEIGHT: 165.9 LBS | OXYGEN SATURATION: 98 % | RESPIRATION RATE: 16 BRPM | DIASTOLIC BLOOD PRESSURE: 71 MMHG | TEMPERATURE: 97.4 F | HEART RATE: 77 BPM | HEIGHT: 66 IN | BODY MASS INDEX: 26.66 KG/M2

## 2020-09-18 VITALS
DIASTOLIC BLOOD PRESSURE: 76 MMHG | OXYGEN SATURATION: 98 % | TEMPERATURE: 97.4 F | BODY MASS INDEX: 26.52 KG/M2 | SYSTOLIC BLOOD PRESSURE: 109 MMHG | HEART RATE: 90 BPM | HEIGHT: 66 IN | WEIGHT: 165 LBS | RESPIRATION RATE: 16 BRPM

## 2020-09-18 VITALS
WEIGHT: 167.99 LBS | BODY MASS INDEX: 27 KG/M2 | DIASTOLIC BLOOD PRESSURE: 61 MMHG | SYSTOLIC BLOOD PRESSURE: 103 MMHG | HEIGHT: 66 IN

## 2020-09-18 DIAGNOSIS — Z79.899 ENCOUNTER FOR MONITORING CARDIOTOXIC DRUG THERAPY: ICD-10-CM

## 2020-09-18 DIAGNOSIS — Z17.1 MALIGNANT NEOPLASM OF UPPER-OUTER QUADRANT OF LEFT BREAST IN FEMALE, ESTROGEN RECEPTOR NEGATIVE (HCC): Primary | ICD-10-CM

## 2020-09-18 DIAGNOSIS — C50.412 MALIGNANT NEOPLASM OF UPPER-OUTER QUADRANT OF LEFT BREAST IN FEMALE, ESTROGEN RECEPTOR NEGATIVE (HCC): Primary | ICD-10-CM

## 2020-09-18 DIAGNOSIS — Z51.81 ENCOUNTER FOR MONITORING CARDIOTOXIC DRUG THERAPY: ICD-10-CM

## 2020-09-18 DIAGNOSIS — Z17.1 MALIGNANT NEOPLASM OF LEFT BREAST IN FEMALE, ESTROGEN RECEPTOR NEGATIVE, UNSPECIFIED SITE OF BREAST (HCC): Primary | ICD-10-CM

## 2020-09-18 DIAGNOSIS — C50.912 MALIGNANT NEOPLASM OF LEFT BREAST IN FEMALE, ESTROGEN RECEPTOR NEGATIVE, UNSPECIFIED SITE OF BREAST (HCC): Primary | ICD-10-CM

## 2020-09-18 LAB
ECHO AV AREA PEAK VELOCITY: 2.95 CM2
ECHO AV AREA/BSA PEAK VELOCITY: 1.6 CM2/M2
ECHO AV PEAK GRADIENT: 5.13 MMHG
ECHO AV PEAK VELOCITY: 113.21 CM/S
ECHO EST RA PRESSURE: 10 MMHG
ECHO LA TO AORTIC ROOT RATIO: 1.13
ECHO LV INTERNAL DIMENSION DIASTOLIC MMODE: 3.98 CM
ECHO LV INTERNAL DIMENSION SYSTOLIC MMODE: 2.34 CM
ECHO LV IVSD MMODE: 0.77 CM
ECHO LV IVSS MMODE: 1.26 CM
ECHO LV POSTERIOR WALL DIASTOLIC MMODE: 0.77 CM
ECHO LV POSTERIOR WALL SYSTOLIC MMODE: 1.5 CM
ECHO LVOT DIAM: 1.97 CM
ECHO LVOT PEAK GRADIENT: 4.83 MMHG
ECHO LVOT PEAK VELOCITY: 109.91 CM/S
ECHO MV A VELOCITY: 65.56 CM/S
ECHO MV E DECELERATION TIME (DT): 0.1 S
ECHO MV E VELOCITY: 62.45 CM/S
ECHO MV E/A RATIO: 0.95
ECHO PV PEAK INSTANTANEOUS GRADIENT SYSTOLIC: 2 MMHG
ECHO PV REGURGITANT MAX VELOCITY: 101.06 CM/S
ECHO PV REGURGITANT MAX VELOCITY: 70.76 CM/S
ECHO RIGHT VENTRICULAR SYSTOLIC PRESSURE (RVSP): 24.56 MMHG
ECHO RV INTERNAL DIMENSION: 3.42 CM
ECHO TV A WAVE: 76.46 CM/S
ECHO TV E WAVE: 75.16 CM/S
ECHO TV REGURGITANT MAX VELOCITY: 190.78 CM/S
ECHO TV REGURGITANT PEAK GRADIENT: 14.56 MMHG
ECHO TV REGURGITANT PEAK GRADIENT: 14.56 MMHG

## 2020-09-18 PROCEDURE — 74011000250 HC RX REV CODE- 250: Performed by: INTERNAL MEDICINE

## 2020-09-18 PROCEDURE — 93306 TTE W/DOPPLER COMPLETE: CPT

## 2020-09-18 PROCEDURE — 74011250636 HC RX REV CODE- 250/636: Performed by: INTERNAL MEDICINE

## 2020-09-18 PROCEDURE — 99215 OFFICE O/P EST HI 40 MIN: CPT | Performed by: INTERNAL MEDICINE

## 2020-09-18 PROCEDURE — 96415 CHEMO IV INFUSION ADDL HR: CPT

## 2020-09-18 PROCEDURE — 96413 CHEMO IV INFUSION 1 HR: CPT

## 2020-09-18 PROCEDURE — 77030012965 HC NDL HUBR BBMI -A

## 2020-09-18 RX ORDER — SODIUM CHLORIDE 9 MG/ML
25 INJECTION, SOLUTION INTRAVENOUS CONTINUOUS
Status: DISCONTINUED | OUTPATIENT
Start: 2020-09-18 | End: 2020-09-19 | Stop reason: HOSPADM

## 2020-09-18 RX ORDER — SODIUM CHLORIDE 0.9 % (FLUSH) 0.9 %
10 SYRINGE (ML) INJECTION AS NEEDED
Status: DISCONTINUED | OUTPATIENT
Start: 2020-09-18 | End: 2020-09-19 | Stop reason: HOSPADM

## 2020-09-18 RX ORDER — SODIUM CHLORIDE 9 MG/ML
10 INJECTION INTRAMUSCULAR; INTRAVENOUS; SUBCUTANEOUS AS NEEDED
Status: DISCONTINUED | OUTPATIENT
Start: 2020-09-18 | End: 2020-09-19 | Stop reason: HOSPADM

## 2020-09-18 RX ORDER — HEPARIN 100 UNIT/ML
300-500 SYRINGE INTRAVENOUS AS NEEDED
Status: DISCONTINUED | OUTPATIENT
Start: 2020-09-18 | End: 2020-09-19 | Stop reason: HOSPADM

## 2020-09-18 RX ADMIN — PERTUZUMAB 420 MG: 30 INJECTION, SOLUTION, CONCENTRATE INTRAVENOUS at 16:22

## 2020-09-18 RX ADMIN — SODIUM CHLORIDE 10 ML: 9 INJECTION, SOLUTION INTRAMUSCULAR; INTRAVENOUS; SUBCUTANEOUS at 14:50

## 2020-09-18 RX ADMIN — SODIUM CHLORIDE 25 ML/HR: 900 INJECTION, SOLUTION INTRAVENOUS at 15:45

## 2020-09-18 RX ADMIN — SODIUM CHLORIDE 460 MG: 900 INJECTION, SOLUTION INTRAVENOUS at 15:47

## 2020-09-18 RX ADMIN — Medication 10 ML: at 16:56

## 2020-09-18 RX ADMIN — Medication 500 UNITS: at 16:56

## 2020-09-18 NOTE — PROGRESS NOTES
Pt arrived to [facility] ambulatory for Herceptin/Perjeta C9 in no acute distress at 1440.  Assessment unremarkable except fatigue. R chest port accessed without issue and positive blood return noted.  Echo completed today before appointment- EF 55-60%. Pt to MD office for follow-up appointment. Visit Vitals  /76 (BP 1 Location: Left arm, BP Patient Position: Sitting)   Pulse 90   Temp 97.4 °F (36.3 °C)   Resp 16   Ht 5' 6\" (1.676 m)   Wt 75.3 kg (165 lb 14.4 oz)   SpO2 98%   Breastfeeding No   BMI 26.78 kg/m²       The following medications administered:  NS @ KVO  Herceptin 460 mg IV over 30 minutes  Perjeta 420 mg IV over 30 minutes    Visit Vitals  /71 (BP 1 Location: Left arm, BP Patient Position: Sitting)   Pulse 77   Temp 97.4 °F (36.3 °C)   Resp 16   Ht 5' 6\" (1.676 m)   Wt 75.3 kg (165 lb 14.4 oz)   SpO2 98%   Breastfeeding No   BMI 26.78 kg/m²       Pt tolerated treatment well.  No adverse reaction noted. Port flushed per policy and needle removed, 2x2 and paper tape placed.  Pt discharged ambulatory in no acute distress at 1700, accompanied by self. Next appointment 10/9/20 @ 1500.

## 2020-09-18 NOTE — PROGRESS NOTES
Alli Landis is a 50 y.o. female here for follow up for:  Chief Complaint   Patient presents with    Breast Cancer     L    Chemotherapy   Cycle 9 Day 1 TCHP    1. Have you been to the ER, urgent care clinic since your last visit? Hospitalized since your last visit? no    2. Have you seen or consulted any other health care providers outside of the [address] [place] [address] since your last visit? Include any pap smears or colon screening. radiation    Finished radiation September 1, 2020. Pt states she is doing well. No complaints.

## 2020-09-23 NOTE — PROGRESS NOTES
2001 [facility]  [address], [address] [place] [address] Derek Rodriguezineau, 200 [facility]  [phone]      Oncology Progress note      Patient: Jose Alfredo Robbins MRN: [id number]  SSN: [id number]    YOB: 1975  Age: 50 y.o. Sex: female        Diagnosis:     1. Left breast carcinoma: Dx: 1/31/2020  T2 N1 M0 (Stage IIA) Invasive ductal carcinoma, Tumor size 2 cm, grade 3, ER 0%, [place] 0%, Her 2 3+     ypT0 ypN0 - pCR    Treatment:     1. Neoadjuvant chemotherapy   Docetaxel, Carboplatin, Trastuzumab + Pertuzumab - s/p 6 cycles  2. Left breast lumpectomy 07/09/2020  2. Adjuvant chemotherapy   Trastuzumab, Pertuzumab - Cycle 3 of 11    Subjective:      Jose Alfredo Robbins is a 50 y.o. female who I am seeing in follow up for a diagnosis of left sided invasive breast carcinoma. She felt a lump in her left breast. Dr. Keith Larios obtained a diagnostic mammogram. The mammogram showed an abnormality in the upper quadrant of the left breast. A biopsy of the lesion revealed a ER 0% [place] 0% and Her 2 3+ Gr 3 IDC of the left breast.     Ms. Dimple Roldan completed neoadjuvant chemotherapy and underwent left breast lumpectomy on 7/9/2020. She had a pCR. She is receiving adjuvant Herceptin/Perjeta. She has completed adjuvant radiation.        Review of Systems:    Constitutional: negative  Eyes: negative  Ears, Nose, Mouth, Throat, and Face: negative  Respiratory: negative  Cardiovascular: negative  Gastrointestinal: negative  Genitourinary:negative  Integument/Breast: negative  Hematologic/Lymphatic: negative  Musculoskeletal:negative  Neurological: negative      Past Medical History:   Diagnosis Date    Anxiety and depression     Cancer ([facility] [address] 01/2020    left breast ca er/pr (-) her 2 (+)     Past Surgical History:   Procedure Laterality Date    HX BREAST BIOPSY Left 07/24/2018    Left sebaceous cyst surgically removed    HX BREAST LUMPECTOMY Left 7/9/2020    LEFT BREAST WIRE-LOCALIZED LUMPECTOMY AND LEFT BREAST SENTINEL NODE BIOPSY performed by Jesus Lin MD at [facility]    HX CYST INCISION AND DRAINAGE Left 20+ yrs ago    HX HEENT      tonsillectomy    HX VASCULAR ACCESS Right 02/25/2020    right chest area    IR INSERT TUNL CVC W PORT OVER 5 YEARS  2/25/2020      Family History   Problem Relation Age of Onset    Hypertension Mother     Kidney Disease Mother     Thyroid Disease Mother      Social History     Tobacco Use    Smoking status: Never Smoker    Smokeless tobacco: Never Used   Substance Use Topics    Alcohol use: No      Prior to Admission medications    Medication Sig Start Date End Date Taking? Authorizing Provider   diphenoxylate-atropine (LOMOTIL) 2.5-0.025 mg per tablet TAKE 1 TABLET BY MOUTH 4 TIMES A DAY AS NEEDED FOR DIARRHEA *MAX 4 TABS/DAY* 8/17/20  Yes Evie Marquez NP   naloxone (NARCAN) 4 mg/actuation nasal spray Use 1 spray intranasally, then discard. Repeat with new spray every 2 min as needed for opioid overdose symptoms, alternating nostrils. 7/9/20  Yes Jesus Lin MD   ethinyl estradiol-etonogestrel (NuvaRing) 0.12-0.015 mg/24 hr vaginal ring by Intravaginal route. Yes Provider, Historical   ondansetron (ZOFRAN ODT) 4 mg disintegrating tablet Take 1 Tab by mouth every eight (8) hours as needed for Nausea or Vomiting. 4/15/20  Yes Linn Marquez NP   prochlorperazine (COMPAZINE) 10 mg tablet Take 0.5 Tabs by mouth every six (6) hours as needed for Nausea for up to 30 doses. 2/11/20  Yes Mikayla Marquez NP   lidocaine-prilocaine (EMLA) topical cream Apply  to affected area as needed for Pain. 2/11/20  Yes Mikayla Marquez NP   dexAMETHasone (DECADRON) 4 mg tablet Take 8 mg by mouth two (2) times daily (with meals). The day of chemotherapy and the day after chemotherapy.  2/11/20  Yes Mikayla Marquez NP   VIIBRYD 40 mg tab tablet TAKE 1 TABLET BY MOUTH EVERY DAY 6/19/18  Yes Provider, Historical          No Known Allergies      Objective:     Visit Vitals  /76   Pulse 90   Temp 97.4 °F (36.3 °C)   Resp 16   Ht 5' 6\" (1.676 m)   Wt 165 lb (74.8 kg)   SpO2 98%   BMI 26.63 kg/m²       Pain Scale: 0 - No pain/10  Pain Location:       Physical Exam:    GENERAL: alert, cooperative, no distress, appears stated age  EYE: conjunctivae/corneas clear. PERRL, EOM's intact  LYMPHATIC: Cervical, supraclavicular, and axillary nodes normal.   THROAT & NECK: normal and no erythema or exudates noted. LUNG: clear to auscultation bilaterally  HEART: regular rate and rhythm, S1, S2 normal, no murmur, click, rub or gallop  ABDOMEN: soft, non-tender. Bowel sounds normal. No masses,  no organomegaly  EXTREMITIES:  extremities normal, atraumatic, no cyanosis or edema  SKIN: Normal.  NEUROLOGIC: AOx3. Gait normal. Reflexes and motor strength normal and symmetric. Cranial nerves 2-12 and sensation grossly intact. Lab Results   Component Value Date/Time    WBC 3.7 06/12/2020 09:15 AM    HGB 10.4 (L) 06/12/2020 09:15 AM    HCT 30.7 (L) 06/12/2020 09:15 AM    PLATELET 614 54/23/9982 09:15 AM    MCV 99.4 (H) 06/12/2020 09:15 AM       Lab Results   Component Value Date/Time    Sodium 139 08/28/2020 02:14 PM    Potassium 3.5 08/28/2020 02:14 PM    Chloride 108 08/28/2020 02:14 PM    CO2 25 08/28/2020 02:14 PM    Anion gap 6 08/28/2020 02:14 PM    Glucose 92 08/28/2020 02:14 PM    BUN 22 (H) 08/28/2020 02:14 PM    Creatinine 0.94 08/28/2020 02:14 PM    BUN/Creatinine ratio 23 (H) 08/28/2020 02:14 PM    GFR est AA >60 08/28/2020 02:14 PM    GFR est non-AA >60 08/28/2020 02:14 PM    Calcium 8.7 08/28/2020 02:14 PM    Bilirubin, total 0.3 08/28/2020 02:14 PM    Alk. phosphatase 76 08/28/2020 02:14 PM    Protein, total 6.7 08/28/2020 02:14 PM    Albumin 3.2 (L) 08/28/2020 02:14 PM    Globulin 3.5 08/28/2020 02:14 PM    A-G Ratio 0.9 (L) 08/28/2020 02:14 PM    ALT (SGPT) 20 08/28/2020 02:14 PM           Assessment:     1.  Left breast carcinoma:  T2 N1 M0 (Stage IIA) Invasive ductal carcinoma, Tumor size 2 cm, grade 3, ER 0%, [place] 0%, Her 2 3+       ypT0 ypN0 - pCR    ECOG PS 0  Intent of Treatment - curative  Prognosis: excellent    LVEF (5/12/2020) - 50-55%    Completed neoadjuvant chemotherapy   Docetaxel, Carboplatin, Trastuzumab + Pertuzumab - s/p 6 cycles    S/p left breast lumpectomy and left SLNB on 7/9/2020  Achieved pCR    Adjuvant radiation on clinical trial - started 8/4/2020    REceiving adjuvant chemotherapy   Trastuzumab, Pertuzumab - Cycle 3 of 11    Tolerating treatment very well  Denies any side effects. A detailed system by system evaluation of side effect was performed to assess adverse events. Blood counts are acceptable. Results reviewed with the patient        Plan:       · Proceed with adjuvant herceptin/perjeta  · Continue adjuvant radiation  · Plan to repeat echocardiogram in mid September  · Follow-up in 6 weeks      Signed by: Remington Hernández MD                     September 22, 2020        CC. Rio Fierro MD  CC.  Janki Baird MD

## 2020-10-04 RX ORDER — ONDANSETRON 2 MG/ML
8 INJECTION INTRAMUSCULAR; INTRAVENOUS AS NEEDED
Status: CANCELLED | OUTPATIENT
Start: 2020-11-20

## 2020-10-04 RX ORDER — ACETAMINOPHEN 325 MG/1
650 TABLET ORAL AS NEEDED
Status: CANCELLED
Start: 2020-12-11

## 2020-10-04 RX ORDER — ACETAMINOPHEN 325 MG/1
650 TABLET ORAL AS NEEDED
Status: CANCELLED
Start: 2020-11-20

## 2020-10-04 RX ORDER — ACETAMINOPHEN 325 MG/1
650 TABLET ORAL AS NEEDED
Status: CANCELLED
Start: 2020-10-09

## 2020-10-04 RX ORDER — DIPHENHYDRAMINE HYDROCHLORIDE 50 MG/ML
50 INJECTION, SOLUTION INTRAMUSCULAR; INTRAVENOUS AS NEEDED
Status: CANCELLED
Start: 2020-10-30

## 2020-10-04 RX ORDER — SODIUM CHLORIDE 0.9 % (FLUSH) 0.9 %
10 SYRINGE (ML) INJECTION AS NEEDED
Status: CANCELLED
Start: 2020-12-11

## 2020-10-04 RX ORDER — HYDROCORTISONE SODIUM SUCCINATE 100 MG/2ML
100 INJECTION, POWDER, FOR SOLUTION INTRAMUSCULAR; INTRAVENOUS AS NEEDED
Status: CANCELLED | OUTPATIENT
Start: 2020-11-20

## 2020-10-04 RX ORDER — EPINEPHRINE 1 MG/ML
0.3 INJECTION, SOLUTION, CONCENTRATE INTRAVENOUS AS NEEDED
Status: CANCELLED | OUTPATIENT
Start: 2020-10-30

## 2020-10-04 RX ORDER — SODIUM CHLORIDE 9 MG/ML
10 INJECTION INTRAMUSCULAR; INTRAVENOUS; SUBCUTANEOUS AS NEEDED
Status: CANCELLED | OUTPATIENT
Start: 2020-12-11

## 2020-10-04 RX ORDER — DIPHENHYDRAMINE HYDROCHLORIDE 50 MG/ML
50 INJECTION, SOLUTION INTRAMUSCULAR; INTRAVENOUS AS NEEDED
Status: CANCELLED
Start: 2020-11-20

## 2020-10-04 RX ORDER — SODIUM CHLORIDE 9 MG/ML
25 INJECTION, SOLUTION INTRAVENOUS CONTINUOUS
Status: CANCELLED | OUTPATIENT
Start: 2020-10-30 | End: 2020-10-31

## 2020-10-04 RX ORDER — EPINEPHRINE 1 MG/ML
0.3 INJECTION, SOLUTION, CONCENTRATE INTRAVENOUS AS NEEDED
Status: CANCELLED | OUTPATIENT
Start: 2020-11-20

## 2020-10-04 RX ORDER — HEPARIN 100 UNIT/ML
300-500 SYRINGE INTRAVENOUS AS NEEDED
Status: CANCELLED
Start: 2020-10-30

## 2020-10-04 RX ORDER — DIPHENHYDRAMINE HYDROCHLORIDE 50 MG/ML
50 INJECTION, SOLUTION INTRAMUSCULAR; INTRAVENOUS
Status: CANCELLED | OUTPATIENT
Start: 2020-10-30

## 2020-10-04 RX ORDER — ACETAMINOPHEN 325 MG/1
650 TABLET ORAL
Status: CANCELLED | OUTPATIENT
Start: 2020-10-30

## 2020-10-04 RX ORDER — ALBUTEROL SULFATE 0.83 MG/ML
2.5 SOLUTION RESPIRATORY (INHALATION) AS NEEDED
Status: CANCELLED
Start: 2020-10-30

## 2020-10-04 RX ORDER — SODIUM CHLORIDE 9 MG/ML
25 INJECTION, SOLUTION INTRAVENOUS CONTINUOUS
Status: CANCELLED | OUTPATIENT
Start: 2020-12-11 | End: 2020-12-12

## 2020-10-04 RX ORDER — SODIUM CHLORIDE 9 MG/ML
10 INJECTION INTRAMUSCULAR; INTRAVENOUS; SUBCUTANEOUS AS NEEDED
Status: CANCELLED | OUTPATIENT
Start: 2020-11-20

## 2020-10-04 RX ORDER — ALBUTEROL SULFATE 0.83 MG/ML
2.5 SOLUTION RESPIRATORY (INHALATION) AS NEEDED
Status: CANCELLED
Start: 2020-11-20

## 2020-10-04 RX ORDER — DIPHENHYDRAMINE HYDROCHLORIDE 50 MG/ML
50 INJECTION, SOLUTION INTRAMUSCULAR; INTRAVENOUS AS NEEDED
Status: CANCELLED
Start: 2020-10-09

## 2020-10-04 RX ORDER — ALBUTEROL SULFATE 0.83 MG/ML
2.5 SOLUTION RESPIRATORY (INHALATION) AS NEEDED
Status: CANCELLED
Start: 2020-12-11

## 2020-10-04 RX ORDER — SODIUM CHLORIDE 9 MG/ML
25 INJECTION, SOLUTION INTRAVENOUS CONTINUOUS
Status: CANCELLED | OUTPATIENT
Start: 2020-11-20 | End: 2020-11-21

## 2020-10-04 RX ORDER — DIPHENHYDRAMINE HYDROCHLORIDE 50 MG/ML
25 INJECTION, SOLUTION INTRAMUSCULAR; INTRAVENOUS AS NEEDED
Status: CANCELLED
Start: 2020-10-30

## 2020-10-04 RX ORDER — DIPHENHYDRAMINE HYDROCHLORIDE 50 MG/ML
25 INJECTION, SOLUTION INTRAMUSCULAR; INTRAVENOUS AS NEEDED
Status: CANCELLED
Start: 2020-10-09

## 2020-10-04 RX ORDER — ACETAMINOPHEN 325 MG/1
650 TABLET ORAL
Status: CANCELLED | OUTPATIENT
Start: 2020-12-11

## 2020-10-04 RX ORDER — DIPHENHYDRAMINE HYDROCHLORIDE 50 MG/ML
25 INJECTION, SOLUTION INTRAMUSCULAR; INTRAVENOUS AS NEEDED
Status: CANCELLED
Start: 2020-12-11

## 2020-10-04 RX ORDER — ONDANSETRON 2 MG/ML
8 INJECTION INTRAMUSCULAR; INTRAVENOUS AS NEEDED
Status: CANCELLED | OUTPATIENT
Start: 2020-12-11

## 2020-10-04 RX ORDER — DIPHENHYDRAMINE HYDROCHLORIDE 50 MG/ML
50 INJECTION, SOLUTION INTRAMUSCULAR; INTRAVENOUS
Status: CANCELLED | OUTPATIENT
Start: 2020-11-20

## 2020-10-04 RX ORDER — ACETAMINOPHEN 325 MG/1
650 TABLET ORAL
Status: CANCELLED | OUTPATIENT
Start: 2020-11-20

## 2020-10-04 RX ORDER — ACETAMINOPHEN 325 MG/1
650 TABLET ORAL
Status: CANCELLED | OUTPATIENT
Start: 2020-10-09

## 2020-10-04 RX ORDER — SODIUM CHLORIDE 9 MG/ML
10 INJECTION INTRAMUSCULAR; INTRAVENOUS; SUBCUTANEOUS AS NEEDED
Status: CANCELLED | OUTPATIENT
Start: 2020-10-30

## 2020-10-04 RX ORDER — HEPARIN 100 UNIT/ML
300-500 SYRINGE INTRAVENOUS AS NEEDED
Status: CANCELLED
Start: 2020-12-11

## 2020-10-04 RX ORDER — HYDROCORTISONE SODIUM SUCCINATE 100 MG/2ML
100 INJECTION, POWDER, FOR SOLUTION INTRAMUSCULAR; INTRAVENOUS AS NEEDED
Status: CANCELLED | OUTPATIENT
Start: 2020-12-11

## 2020-10-04 RX ORDER — EPINEPHRINE 1 MG/ML
0.3 INJECTION, SOLUTION, CONCENTRATE INTRAVENOUS AS NEEDED
Status: CANCELLED | OUTPATIENT
Start: 2020-10-09

## 2020-10-04 RX ORDER — DIPHENHYDRAMINE HYDROCHLORIDE 50 MG/ML
25 INJECTION, SOLUTION INTRAMUSCULAR; INTRAVENOUS AS NEEDED
Status: CANCELLED
Start: 2020-11-20

## 2020-10-04 RX ORDER — ALBUTEROL SULFATE 0.83 MG/ML
2.5 SOLUTION RESPIRATORY (INHALATION) AS NEEDED
Status: CANCELLED
Start: 2020-10-09

## 2020-10-04 RX ORDER — SODIUM CHLORIDE 0.9 % (FLUSH) 0.9 %
10 SYRINGE (ML) INJECTION AS NEEDED
Status: CANCELLED
Start: 2020-10-30

## 2020-10-04 RX ORDER — HYDROCORTISONE SODIUM SUCCINATE 100 MG/2ML
100 INJECTION, POWDER, FOR SOLUTION INTRAMUSCULAR; INTRAVENOUS AS NEEDED
Status: CANCELLED | OUTPATIENT
Start: 2020-10-30

## 2020-10-04 RX ORDER — ACETAMINOPHEN 325 MG/1
650 TABLET ORAL AS NEEDED
Status: CANCELLED
Start: 2020-10-30

## 2020-10-04 RX ORDER — ONDANSETRON 2 MG/ML
8 INJECTION INTRAMUSCULAR; INTRAVENOUS AS NEEDED
Status: CANCELLED | OUTPATIENT
Start: 2020-10-09

## 2020-10-04 RX ORDER — EPINEPHRINE 1 MG/ML
0.3 INJECTION, SOLUTION, CONCENTRATE INTRAVENOUS AS NEEDED
Status: CANCELLED | OUTPATIENT
Start: 2020-12-11

## 2020-10-04 RX ORDER — SODIUM CHLORIDE 0.9 % (FLUSH) 0.9 %
10 SYRINGE (ML) INJECTION AS NEEDED
Status: CANCELLED
Start: 2020-11-20

## 2020-10-04 RX ORDER — HYDROCORTISONE SODIUM SUCCINATE 100 MG/2ML
100 INJECTION, POWDER, FOR SOLUTION INTRAMUSCULAR; INTRAVENOUS AS NEEDED
Status: CANCELLED | OUTPATIENT
Start: 2020-10-09

## 2020-10-04 RX ORDER — DIPHENHYDRAMINE HYDROCHLORIDE 50 MG/ML
50 INJECTION, SOLUTION INTRAMUSCULAR; INTRAVENOUS AS NEEDED
Status: CANCELLED
Start: 2020-12-11

## 2020-10-04 RX ORDER — HEPARIN 100 UNIT/ML
300-500 SYRINGE INTRAVENOUS AS NEEDED
Status: CANCELLED
Start: 2020-11-20

## 2020-10-04 RX ORDER — DIPHENHYDRAMINE HYDROCHLORIDE 50 MG/ML
50 INJECTION, SOLUTION INTRAMUSCULAR; INTRAVENOUS
Status: CANCELLED | OUTPATIENT
Start: 2020-12-11

## 2020-10-04 RX ORDER — DIPHENHYDRAMINE HYDROCHLORIDE 50 MG/ML
50 INJECTION, SOLUTION INTRAMUSCULAR; INTRAVENOUS
Status: CANCELLED | OUTPATIENT
Start: 2020-10-09

## 2020-10-04 RX ORDER — ONDANSETRON 2 MG/ML
8 INJECTION INTRAMUSCULAR; INTRAVENOUS AS NEEDED
Status: CANCELLED | OUTPATIENT
Start: 2020-10-30

## 2020-10-09 ENCOUNTER — HOSPITAL ENCOUNTER (OUTPATIENT)
Dept: INFUSION THERAPY | Age: 45
Discharge: HOME | End: 2020-10-09
Payer: COMMERCIAL

## 2020-10-09 VITALS
SYSTOLIC BLOOD PRESSURE: 109 MMHG | BODY MASS INDEX: 26.78 KG/M2 | TEMPERATURE: 97.3 F | WEIGHT: 165.9 LBS | RESPIRATION RATE: 16 BRPM | HEART RATE: 64 BPM | DIASTOLIC BLOOD PRESSURE: 74 MMHG

## 2020-10-09 DIAGNOSIS — C50.912 MALIGNANT NEOPLASM OF LEFT BREAST IN FEMALE, ESTROGEN RECEPTOR NEGATIVE, UNSPECIFIED SITE OF BREAST (HCC): Primary | ICD-10-CM

## 2020-10-09 DIAGNOSIS — Z17.1 MALIGNANT NEOPLASM OF LEFT BREAST IN FEMALE, ESTROGEN RECEPTOR NEGATIVE, UNSPECIFIED SITE OF BREAST (HCC): Primary | ICD-10-CM

## 2020-10-09 PROCEDURE — 96417 CHEMO IV INFUS EACH ADDL SEQ: CPT

## 2020-10-09 PROCEDURE — 96413 CHEMO IV INFUSION 1 HR: CPT

## 2020-10-09 PROCEDURE — 74011250636 HC RX REV CODE- 250/636: Performed by: INTERNAL MEDICINE

## 2020-10-09 PROCEDURE — 77030012965 HC NDL HUBR BBMI -A

## 2020-10-09 RX ORDER — HEPARIN 100 UNIT/ML
300-500 SYRINGE INTRAVENOUS AS NEEDED
Status: DISCONTINUED | OUTPATIENT
Start: 2020-10-09 | End: 2020-10-10 | Stop reason: HOSPADM

## 2020-10-09 RX ORDER — SODIUM CHLORIDE 9 MG/ML
25 INJECTION, SOLUTION INTRAVENOUS CONTINUOUS
Status: DISCONTINUED | OUTPATIENT
Start: 2020-10-09 | End: 2020-10-10 | Stop reason: HOSPADM

## 2020-10-09 RX ORDER — SODIUM CHLORIDE 0.9 % (FLUSH) 0.9 %
10 SYRINGE (ML) INJECTION AS NEEDED
Status: DISCONTINUED | OUTPATIENT
Start: 2020-10-09 | End: 2020-10-10 | Stop reason: HOSPADM

## 2020-10-09 RX ORDER — SODIUM CHLORIDE 9 MG/ML
10 INJECTION INTRAMUSCULAR; INTRAVENOUS; SUBCUTANEOUS AS NEEDED
Status: DISCONTINUED | OUTPATIENT
Start: 2020-10-09 | End: 2020-10-10 | Stop reason: HOSPADM

## 2020-10-09 RX ADMIN — SODIUM CHLORIDE 25 ML/HR: 900 INJECTION, SOLUTION INTRAVENOUS at 15:20

## 2020-10-09 RX ADMIN — Medication 10 ML: at 15:20

## 2020-10-09 RX ADMIN — PERTUZUMAB 420 MG: 30 INJECTION, SOLUTION, CONCENTRATE INTRAVENOUS at 16:15

## 2020-10-09 RX ADMIN — SODIUM CHLORIDE 460 MG: 9 INJECTION, SOLUTION INTRAVENOUS at 15:38

## 2020-10-09 NOTE — PROGRESS NOTES
[facility] Visit Note    0311 Pt arrived at [facility] ambulatory and in no distress for Herceptin/Perjeta. Assessment completed, no new complaints voiced. Port accessed per protocol with positive blood return. Visit Vitals  /71   Pulse 80   Temp 97.3 °F (36.3 °C)   Resp 16   Wt 75.3 kg (165 lb 14.4 oz)   BMI 26.78 kg/m²       Medications received:  Medications Administered     0.9% sodium chloride infusion     Admin Date  10/09/2020 Action  New Bag Dose  25 mL/hr Rate  25 mL/hr Route  IntraVENous Administered By  Lu Putnam, PAGE          pertuzumab (PERJETA) 420 mg in 0.9% sodium chloride 250 mL, overfill volume 25 mL IVPB     Admin Date  10/09/2020 Action  New Bag Dose  420 mg Rate  578 mL/hr Route  IntraVENous Administered By  Lu Putnam RN          saline peripheral flush soln 10 mL     Admin Date  10/09/2020 Action  Given Dose  10 mL Route  InterCATHeter Administered By  Lu Putnam RN          trastuzumab-qyyp (TRAZIMERA) 460 mg in 0.9% sodium chloride 250 mL, overfill volume 25 mL IVPB     Admin Date  10/09/2020 Action  Given Dose  460 mg Rate  594 mL/hr Route  IntraVENous Administered By  Lu Putnam, RN                5689 Tolerated treatment well, no adverse reaction noted. Port flushed and de-accessed. D/Cd from [facility] ambulatory and in no distress accompanied by self.   Next appt 10/30/20

## 2020-10-30 ENCOUNTER — HOSPITAL ENCOUNTER (OUTPATIENT)
Dept: INFUSION THERAPY | Age: 45
End: 2020-10-30
Payer: COMMERCIAL

## 2020-10-30 ENCOUNTER — HOSPITAL ENCOUNTER (OUTPATIENT)
Dept: INFUSION THERAPY | Age: 45
Discharge: HOME | End: 2020-10-30
Payer: COMMERCIAL

## 2020-10-30 VITALS
HEART RATE: 72 BPM | BODY MASS INDEX: 26.74 KG/M2 | DIASTOLIC BLOOD PRESSURE: 74 MMHG | WEIGHT: 165.7 LBS | RESPIRATION RATE: 16 BRPM | TEMPERATURE: 97.5 F | SYSTOLIC BLOOD PRESSURE: 113 MMHG

## 2020-10-30 DIAGNOSIS — C50.912 MALIGNANT NEOPLASM OF LEFT BREAST IN FEMALE, ESTROGEN RECEPTOR NEGATIVE, UNSPECIFIED SITE OF BREAST (HCC): Primary | ICD-10-CM

## 2020-10-30 DIAGNOSIS — Z17.1 MALIGNANT NEOPLASM OF LEFT BREAST IN FEMALE, ESTROGEN RECEPTOR NEGATIVE, UNSPECIFIED SITE OF BREAST (HCC): Primary | ICD-10-CM

## 2020-10-30 PROCEDURE — 74011250636 HC RX REV CODE- 250/636: Performed by: INTERNAL MEDICINE

## 2020-10-30 PROCEDURE — 74011000250 HC RX REV CODE- 250: Performed by: INTERNAL MEDICINE

## 2020-10-30 PROCEDURE — 77030012965 HC NDL HUBR BBMI -A

## 2020-10-30 PROCEDURE — 96413 CHEMO IV INFUSION 1 HR: CPT

## 2020-10-30 PROCEDURE — 96417 CHEMO IV INFUS EACH ADDL SEQ: CPT

## 2020-10-30 RX ORDER — SODIUM CHLORIDE 9 MG/ML
25 INJECTION, SOLUTION INTRAVENOUS CONTINUOUS
Status: DISCONTINUED | OUTPATIENT
Start: 2020-10-30 | End: 2020-10-31 | Stop reason: HOSPADM

## 2020-10-30 RX ORDER — HEPARIN 100 UNIT/ML
300-500 SYRINGE INTRAVENOUS AS NEEDED
Status: DISCONTINUED | OUTPATIENT
Start: 2020-10-30 | End: 2020-10-31 | Stop reason: HOSPADM

## 2020-10-30 RX ORDER — SODIUM CHLORIDE 9 MG/ML
10 INJECTION INTRAMUSCULAR; INTRAVENOUS; SUBCUTANEOUS AS NEEDED
Status: DISCONTINUED | OUTPATIENT
Start: 2020-10-30 | End: 2020-10-31 | Stop reason: HOSPADM

## 2020-10-30 RX ORDER — SODIUM CHLORIDE 0.9 % (FLUSH) 0.9 %
10 SYRINGE (ML) INJECTION AS NEEDED
Status: DISCONTINUED | OUTPATIENT
Start: 2020-10-30 | End: 2020-10-31 | Stop reason: HOSPADM

## 2020-10-30 RX ADMIN — SODIUM CHLORIDE 10 ML: 9 INJECTION, SOLUTION INTRAMUSCULAR; INTRAVENOUS; SUBCUTANEOUS at 15:00

## 2020-10-30 RX ADMIN — Medication 10 ML: at 16:10

## 2020-10-30 RX ADMIN — Medication 500 UNITS: at 16:10

## 2020-10-30 RX ADMIN — PERTUZUMAB 420 MG: 30 INJECTION, SOLUTION, CONCENTRATE INTRAVENOUS at 15:35

## 2020-10-30 RX ADMIN — SODIUM CHLORIDE 25 ML/HR: 900 INJECTION, SOLUTION INTRAVENOUS at 15:00

## 2020-10-30 RX ADMIN — SODIUM CHLORIDE 460 MG: 9 INJECTION, SOLUTION INTRAVENOUS at 15:00

## 2020-10-30 NOTE — PROGRESS NOTES
[facility] VISIT NOTE    6811  Pt arrived at [facility] ambulatory and in no distress for C11 Trazimera + Perjeta. Assessment completed, pt denies any complaints today. Blood pressure 121/73, pulse 92, temperature 97.5 °F (36.4 °C), resp. rate 16, weight 75.2 kg (165 lb 11.2 oz). Right chest port accessed with 0.75 in zaman no difficulty. Positive blood return noted. Medications received:  Trazimera IV  Perjeta IV    Blood pressure 113/74, pulse 72, temperature 97.5 °F (36.4 °C), resp. rate 16, weight 75.2 kg (165 lb 11.2 oz). Tolerated treatment well, no adverse reaction noted. Port de-accessed and flushed per protocol. Positive blood return noted. 1610  D/C'd from [facility] ambulatory and in no distress. Next appointment is 11/20/20 at 1500.

## 2020-11-05 NOTE — PROGRESS NOTES
Shey Pena is a 50 y.o. female here for follow up for left breast cancer. Chemo today:  Cycle 12 Day 1 Carboplatin/Docetaxel/Trastubumab/Pertuzumab    1. Have you been to the ER, urgent care clinic since your last visit? Hospitalized since your last visit? no    2. Have you seen or consulted any other health care providers outside of the [address] [place] [address] since your last visit? Include any pap smears or colon screening. no    Pt states she has been doing fine. No complaints.

## 2020-11-06 ENCOUNTER — HOSPITAL ENCOUNTER (OUTPATIENT)
Dept: INFUSION THERAPY | Age: 45
End: 2020-11-06

## 2020-11-09 ENCOUNTER — OFFICE VISIT (OUTPATIENT)
Dept: SURGERY | Age: 45
End: 2020-11-09
Payer: COMMERCIAL

## 2020-11-09 VITALS
BODY MASS INDEX: 26.52 KG/M2 | WEIGHT: 165 LBS | DIASTOLIC BLOOD PRESSURE: 50 MMHG | TEMPERATURE: 97.8 F | HEIGHT: 66 IN | SYSTOLIC BLOOD PRESSURE: 100 MMHG | HEART RATE: 71 BPM

## 2020-11-09 DIAGNOSIS — Z85.3 HISTORY OF BREAST CANCER IN FEMALE: ICD-10-CM

## 2020-11-09 DIAGNOSIS — C50.412 MALIGNANT NEOPLASM OF UPPER-OUTER QUADRANT OF LEFT FEMALE BREAST, UNSPECIFIED ESTROGEN RECEPTOR STATUS (HCC): Primary | ICD-10-CM

## 2020-11-09 DIAGNOSIS — Z92.3 S/P RADIATION THERAPY: ICD-10-CM

## 2020-11-09 DIAGNOSIS — Z98.890 S/P LUMPECTOMY OF BREAST: ICD-10-CM

## 2020-11-09 PROCEDURE — 99213 OFFICE O/P EST LOW 20 MIN: CPT | Performed by: NURSE PRACTITIONER

## 2020-11-09 NOTE — PROGRESS NOTES
HISTORY OF PRESENT ILLNESS  Chencho Hoover is a 50 y.o. female. HPI ESTABLISHED patient presents for follow-up to LEFT breast cancer. Denies breast mass, skin changes, nipple discharge and pain. Breast cancer-  Referring - Dr. Jeremías Wilson  1/31/2020 - T2 N1 M0 (Stage IIA) Invasive ductal carcinoma, Tumor size 2 cm, grade 3, ER 0%, MS 0%, Her 2 3+  2/7/2020 - bone scans and CT scans. Showed benign fibroid uterus, O/w clear  2/24/2020 - RIGHT breast MRI guided biopsy. Benign. 2/25/2020 - port insertion in IR  2/28/2020 - started neoadjuvant TCH-P - Dr. Shirin Peters  7/9/2020 - LEFT breast lumpectomy and LEFT SLNB. PCR. - Dr. Adam Gomez  9/2020 - completed XRT - Dr. Sonyn Palmer   3/2021 - anticipate finishing HP with Dr. Shirin Peters     No family history of breast or ovarian cancer. The patient is genetic test negative [facility] 2/2020). OB History    No obstetric history on file. Obstetric Comments   Menarche:  13. LMP: 7/8/20  # of Children:  4. Age at Delivery of First Child:  30.   Hysterectomy/oophorectomy:  YES/NO. Breast Bx:  ? Conchetta Peaks Hx of Breast Feeding:  yes. BCP:  yes. Hormone therapy:  no.                  Past Surgical History:   Procedure Laterality Date    HX BREAST BIOPSY Left 07/24/2018    Left sebaceous cyst surgically removed    HX BREAST LUMPECTOMY Left 7/9/2020    LEFT BREAST WIRE-LOCALIZED LUMPECTOMY AND LEFT BREAST SENTINEL NODE BIOPSY performed by Antonio Boggs [facility] at [facility] AM[facility]    HX CYST INCISION AND DRAINAGE Left 20+ yrs ago    HX HEENT      tonsillectomy    HX VASCULAR ACCESS Right 02/25/2020    right chest area    IR INSERT TUNL CVC W PORT OVER 5 YEARS  2/25/2020         ROS    Physical Exam  Constitutional:       Appearance: Normal appearance. Chest:      Breasts:         Right: No mass, nipple discharge, skin change or tenderness. Left: No mass, nipple discharge, skin change or tenderness. Musculoskeletal: Normal range of motion.       Comments: UE x 2   Lymphadenopathy:      Upper Body:      Right upper body: No supraclavicular or axillary adenopathy. Left upper body: No supraclavicular or axillary adenopathy. Psychiatric:         Attention and Perception: Attention normal.         Mood and Affect: Mood normal.         Speech: Speech normal.         Behavior: Behavior normal.       Visit Vitals  BP (!) 100/50 (BP 1 Location: Left arm, BP Patient Position: Sitting)   Pulse 71   Temp 97.8 °F (36.6 °C) (Skin)   Ht 5' 6\" (1.676 m)   Wt 165 lb (74.8 kg)   BMI 26.63 kg/m²       ASSESSMENT and PLAN  Encounter Diagnoses   Name Primary?  Malignant neoplasm of upper-outer quadrant of left female breast, unspecified estrogen receptor status ([facility] [address] Yes    S/P lumpectomy of breast     S/P radiation therapy     History of breast cancer in female      Normal exam with no evidence of local recurrence. Reviewed pathology and treatment. Discussed normal post treatment changes including scar tissue, nerve regeneration and post XRT changes and chest wall pain. Reviewed s/sx of local and distant recurrence. BDmammogram 3D in 3/2021. RTC in 6 months or sooner PRN. She is comfortable with this plan. All questions answered and she stated understanding.

## 2020-11-09 NOTE — PATIENT INSTRUCTIONS

## 2020-11-14 RX ORDER — ACETAMINOPHEN 325 MG/1
650 TABLET ORAL AS NEEDED
Status: CANCELLED
Start: 2021-02-12

## 2020-11-14 RX ORDER — EPINEPHRINE 1 MG/ML
0.3 INJECTION, SOLUTION, CONCENTRATE INTRAVENOUS AS NEEDED
Status: CANCELLED | OUTPATIENT
Start: 2021-02-12

## 2020-11-14 RX ORDER — HEPARIN 100 UNIT/ML
300-500 SYRINGE INTRAVENOUS AS NEEDED
Status: CANCELLED
Start: 2021-01-22

## 2020-11-14 RX ORDER — DIPHENHYDRAMINE HYDROCHLORIDE 50 MG/ML
50 INJECTION, SOLUTION INTRAMUSCULAR; INTRAVENOUS
Status: CANCELLED | OUTPATIENT
Start: 2021-02-12

## 2020-11-14 RX ORDER — HEPARIN 100 UNIT/ML
300-500 SYRINGE INTRAVENOUS AS NEEDED
Status: CANCELLED
Start: 2021-02-12

## 2020-11-14 RX ORDER — SODIUM CHLORIDE 9 MG/ML
10 INJECTION INTRAMUSCULAR; INTRAVENOUS; SUBCUTANEOUS AS NEEDED
Status: CANCELLED | OUTPATIENT
Start: 2021-03-12

## 2020-11-14 RX ORDER — ACETAMINOPHEN 325 MG/1
650 TABLET ORAL AS NEEDED
Status: CANCELLED
Start: 2021-01-22

## 2020-11-14 RX ORDER — HEPARIN 100 UNIT/ML
300-500 SYRINGE INTRAVENOUS AS NEEDED
Status: CANCELLED
Start: 2021-03-12

## 2020-11-14 RX ORDER — SODIUM CHLORIDE 9 MG/ML
10 INJECTION INTRAMUSCULAR; INTRAVENOUS; SUBCUTANEOUS AS NEEDED
Status: CANCELLED | OUTPATIENT
Start: 2021-01-04

## 2020-11-14 RX ORDER — ALBUTEROL SULFATE 0.83 MG/ML
2.5 SOLUTION RESPIRATORY (INHALATION) AS NEEDED
Status: CANCELLED
Start: 2021-01-22

## 2020-11-14 RX ORDER — SODIUM CHLORIDE 0.9 % (FLUSH) 0.9 %
10 SYRINGE (ML) INJECTION AS NEEDED
Status: CANCELLED
Start: 2021-02-12

## 2020-11-14 RX ORDER — DIPHENHYDRAMINE HYDROCHLORIDE 50 MG/ML
25 INJECTION, SOLUTION INTRAMUSCULAR; INTRAVENOUS AS NEEDED
Status: CANCELLED
Start: 2021-03-12

## 2020-11-14 RX ORDER — DIPHENHYDRAMINE HYDROCHLORIDE 50 MG/ML
25 INJECTION, SOLUTION INTRAMUSCULAR; INTRAVENOUS AS NEEDED
Status: CANCELLED
Start: 2021-01-04

## 2020-11-14 RX ORDER — DIPHENHYDRAMINE HYDROCHLORIDE 50 MG/ML
25 INJECTION, SOLUTION INTRAMUSCULAR; INTRAVENOUS AS NEEDED
Status: CANCELLED
Start: 2021-02-12

## 2020-11-14 RX ORDER — ONDANSETRON 2 MG/ML
8 INJECTION INTRAMUSCULAR; INTRAVENOUS AS NEEDED
Status: CANCELLED | OUTPATIENT
Start: 2021-01-04

## 2020-11-14 RX ORDER — HYDROCORTISONE SODIUM SUCCINATE 100 MG/2ML
100 INJECTION, POWDER, FOR SOLUTION INTRAMUSCULAR; INTRAVENOUS AS NEEDED
Status: CANCELLED | OUTPATIENT
Start: 2021-02-12

## 2020-11-14 RX ORDER — ALBUTEROL SULFATE 0.83 MG/ML
2.5 SOLUTION RESPIRATORY (INHALATION) AS NEEDED
Status: CANCELLED
Start: 2021-02-12

## 2020-11-14 RX ORDER — HYDROCORTISONE SODIUM SUCCINATE 100 MG/2ML
100 INJECTION, POWDER, FOR SOLUTION INTRAMUSCULAR; INTRAVENOUS AS NEEDED
Status: CANCELLED | OUTPATIENT
Start: 2021-03-12

## 2020-11-14 RX ORDER — EPINEPHRINE 1 MG/ML
0.3 INJECTION, SOLUTION, CONCENTRATE INTRAVENOUS AS NEEDED
Status: CANCELLED | OUTPATIENT
Start: 2021-03-12

## 2020-11-14 RX ORDER — SODIUM CHLORIDE 9 MG/ML
10 INJECTION INTRAMUSCULAR; INTRAVENOUS; SUBCUTANEOUS AS NEEDED
Status: CANCELLED | OUTPATIENT
Start: 2021-02-12

## 2020-11-14 RX ORDER — ONDANSETRON 2 MG/ML
8 INJECTION INTRAMUSCULAR; INTRAVENOUS AS NEEDED
Status: CANCELLED | OUTPATIENT
Start: 2021-02-12

## 2020-11-14 RX ORDER — SODIUM CHLORIDE 9 MG/ML
25 INJECTION, SOLUTION INTRAVENOUS CONTINUOUS
Status: CANCELLED | OUTPATIENT
Start: 2021-02-12 | End: 2021-02-13

## 2020-11-14 RX ORDER — SODIUM CHLORIDE 9 MG/ML
10 INJECTION INTRAMUSCULAR; INTRAVENOUS; SUBCUTANEOUS AS NEEDED
Status: CANCELLED | OUTPATIENT
Start: 2021-01-22

## 2020-11-14 RX ORDER — EPINEPHRINE 1 MG/ML
0.3 INJECTION, SOLUTION, CONCENTRATE INTRAVENOUS AS NEEDED
Status: CANCELLED | OUTPATIENT
Start: 2021-01-04

## 2020-11-14 RX ORDER — ACETAMINOPHEN 325 MG/1
650 TABLET ORAL
Status: CANCELLED | OUTPATIENT
Start: 2021-01-22

## 2020-11-14 RX ORDER — DIPHENHYDRAMINE HYDROCHLORIDE 50 MG/ML
50 INJECTION, SOLUTION INTRAMUSCULAR; INTRAVENOUS AS NEEDED
Status: CANCELLED
Start: 2021-02-12

## 2020-11-14 RX ORDER — DIPHENHYDRAMINE HYDROCHLORIDE 50 MG/ML
25 INJECTION, SOLUTION INTRAMUSCULAR; INTRAVENOUS AS NEEDED
Status: CANCELLED
Start: 2021-01-22

## 2020-11-14 RX ORDER — DIPHENHYDRAMINE HYDROCHLORIDE 50 MG/ML
50 INJECTION, SOLUTION INTRAMUSCULAR; INTRAVENOUS AS NEEDED
Status: CANCELLED
Start: 2021-03-12

## 2020-11-14 RX ORDER — ONDANSETRON 2 MG/ML
8 INJECTION INTRAMUSCULAR; INTRAVENOUS AS NEEDED
Status: CANCELLED | OUTPATIENT
Start: 2021-03-12

## 2020-11-14 RX ORDER — DIPHENHYDRAMINE HYDROCHLORIDE 50 MG/ML
50 INJECTION, SOLUTION INTRAMUSCULAR; INTRAVENOUS AS NEEDED
Status: CANCELLED
Start: 2021-01-22

## 2020-11-14 RX ORDER — HEPARIN 100 UNIT/ML
300-500 SYRINGE INTRAVENOUS AS NEEDED
Status: CANCELLED
Start: 2021-01-04

## 2020-11-14 RX ORDER — SODIUM CHLORIDE 9 MG/ML
25 INJECTION, SOLUTION INTRAVENOUS CONTINUOUS
Status: CANCELLED | OUTPATIENT
Start: 2021-01-22 | End: 2021-01-23

## 2020-11-14 RX ORDER — ACETAMINOPHEN 325 MG/1
650 TABLET ORAL
Status: CANCELLED | OUTPATIENT
Start: 2021-03-12

## 2020-11-14 RX ORDER — DIPHENHYDRAMINE HYDROCHLORIDE 50 MG/ML
50 INJECTION, SOLUTION INTRAMUSCULAR; INTRAVENOUS
Status: CANCELLED | OUTPATIENT
Start: 2021-01-22

## 2020-11-14 RX ORDER — ALBUTEROL SULFATE 0.83 MG/ML
2.5 SOLUTION RESPIRATORY (INHALATION) AS NEEDED
Status: CANCELLED
Start: 2021-01-04

## 2020-11-14 RX ORDER — DIPHENHYDRAMINE HYDROCHLORIDE 50 MG/ML
50 INJECTION, SOLUTION INTRAMUSCULAR; INTRAVENOUS
Status: CANCELLED | OUTPATIENT
Start: 2021-01-04

## 2020-11-14 RX ORDER — ONDANSETRON 2 MG/ML
8 INJECTION INTRAMUSCULAR; INTRAVENOUS AS NEEDED
Status: CANCELLED | OUTPATIENT
Start: 2021-01-22

## 2020-11-14 RX ORDER — ACETAMINOPHEN 325 MG/1
650 TABLET ORAL AS NEEDED
Status: CANCELLED
Start: 2021-03-12

## 2020-11-14 RX ORDER — ACETAMINOPHEN 325 MG/1
650 TABLET ORAL
Status: CANCELLED | OUTPATIENT
Start: 2021-01-04

## 2020-11-14 RX ORDER — ACETAMINOPHEN 325 MG/1
650 TABLET ORAL
Status: CANCELLED | OUTPATIENT
Start: 2021-02-12

## 2020-11-14 RX ORDER — DIPHENHYDRAMINE HYDROCHLORIDE 50 MG/ML
50 INJECTION, SOLUTION INTRAMUSCULAR; INTRAVENOUS AS NEEDED
Status: CANCELLED
Start: 2021-01-04

## 2020-11-14 RX ORDER — SODIUM CHLORIDE 9 MG/ML
25 INJECTION, SOLUTION INTRAVENOUS CONTINUOUS
Status: CANCELLED | OUTPATIENT
Start: 2021-03-12 | End: 2021-03-13

## 2020-11-14 RX ORDER — SODIUM CHLORIDE 9 MG/ML
25 INJECTION, SOLUTION INTRAVENOUS CONTINUOUS
Status: CANCELLED | OUTPATIENT
Start: 2021-01-04 | End: 2021-01-05

## 2020-11-14 RX ORDER — SODIUM CHLORIDE 0.9 % (FLUSH) 0.9 %
10 SYRINGE (ML) INJECTION AS NEEDED
Status: CANCELLED
Start: 2021-01-22

## 2020-11-14 RX ORDER — ACETAMINOPHEN 325 MG/1
650 TABLET ORAL AS NEEDED
Status: CANCELLED
Start: 2021-01-04

## 2020-11-14 RX ORDER — EPINEPHRINE 1 MG/ML
0.3 INJECTION, SOLUTION, CONCENTRATE INTRAVENOUS AS NEEDED
Status: CANCELLED | OUTPATIENT
Start: 2021-01-22

## 2020-11-14 RX ORDER — ALBUTEROL SULFATE 0.83 MG/ML
2.5 SOLUTION RESPIRATORY (INHALATION) AS NEEDED
Status: CANCELLED
Start: 2021-03-12

## 2020-11-14 RX ORDER — HYDROCORTISONE SODIUM SUCCINATE 100 MG/2ML
100 INJECTION, POWDER, FOR SOLUTION INTRAMUSCULAR; INTRAVENOUS AS NEEDED
Status: CANCELLED | OUTPATIENT
Start: 2021-01-04

## 2020-11-14 RX ORDER — SODIUM CHLORIDE 0.9 % (FLUSH) 0.9 %
10 SYRINGE (ML) INJECTION AS NEEDED
Status: CANCELLED
Start: 2021-01-04

## 2020-11-14 RX ORDER — HYDROCORTISONE SODIUM SUCCINATE 100 MG/2ML
100 INJECTION, POWDER, FOR SOLUTION INTRAMUSCULAR; INTRAVENOUS AS NEEDED
Status: CANCELLED | OUTPATIENT
Start: 2021-01-22

## 2020-11-14 RX ORDER — DIPHENHYDRAMINE HYDROCHLORIDE 50 MG/ML
50 INJECTION, SOLUTION INTRAMUSCULAR; INTRAVENOUS
Status: CANCELLED | OUTPATIENT
Start: 2021-03-12

## 2020-11-14 RX ORDER — SODIUM CHLORIDE 0.9 % (FLUSH) 0.9 %
10 SYRINGE (ML) INJECTION AS NEEDED
Status: CANCELLED
Start: 2021-03-12

## 2020-11-20 ENCOUNTER — OFFICE VISIT (OUTPATIENT)
Dept: ONCOLOGY | Age: 45
End: 2020-11-20
Payer: COMMERCIAL

## 2020-11-20 ENCOUNTER — HOSPITAL ENCOUNTER (OUTPATIENT)
Dept: INFUSION THERAPY | Age: 45
Discharge: HOME | End: 2020-11-20
Payer: COMMERCIAL

## 2020-11-20 ENCOUNTER — APPOINTMENT (OUTPATIENT)
Dept: INFUSION THERAPY | Age: 45
End: 2020-11-20
Payer: COMMERCIAL

## 2020-11-20 VITALS
WEIGHT: 167 LBS | SYSTOLIC BLOOD PRESSURE: 115 MMHG | HEART RATE: 91 BPM | RESPIRATION RATE: 16 BRPM | DIASTOLIC BLOOD PRESSURE: 74 MMHG | HEIGHT: 66 IN | OXYGEN SATURATION: 97 % | BODY MASS INDEX: 26.84 KG/M2 | TEMPERATURE: 97.7 F

## 2020-11-20 VITALS
RESPIRATION RATE: 16 BRPM | DIASTOLIC BLOOD PRESSURE: 61 MMHG | HEART RATE: 78 BPM | SYSTOLIC BLOOD PRESSURE: 112 MMHG | OXYGEN SATURATION: 97 % | TEMPERATURE: 97.7 F | WEIGHT: 167.8 LBS | HEIGHT: 66 IN | BODY MASS INDEX: 26.97 KG/M2

## 2020-11-20 DIAGNOSIS — C50.912 MALIGNANT NEOPLASM OF LEFT BREAST IN FEMALE, ESTROGEN RECEPTOR NEGATIVE, UNSPECIFIED SITE OF BREAST (HCC): Primary | ICD-10-CM

## 2020-11-20 DIAGNOSIS — Z79.899 ENCOUNTER FOR MONITORING CARDIOTOXIC DRUG THERAPY: ICD-10-CM

## 2020-11-20 DIAGNOSIS — Z17.1 MALIGNANT NEOPLASM OF LEFT BREAST IN FEMALE, ESTROGEN RECEPTOR NEGATIVE, UNSPECIFIED SITE OF BREAST (HCC): Primary | ICD-10-CM

## 2020-11-20 DIAGNOSIS — Z51.81 ENCOUNTER FOR MONITORING CARDIOTOXIC DRUG THERAPY: ICD-10-CM

## 2020-11-20 PROCEDURE — 74011250636 HC RX REV CODE- 250/636: Performed by: INTERNAL MEDICINE

## 2020-11-20 PROCEDURE — 77030012965 HC NDL HUBR BBMI -A

## 2020-11-20 PROCEDURE — 74011000250 HC RX REV CODE- 250: Performed by: INTERNAL MEDICINE

## 2020-11-20 PROCEDURE — 96413 CHEMO IV INFUSION 1 HR: CPT

## 2020-11-20 PROCEDURE — 99215 OFFICE O/P EST HI 40 MIN: CPT | Performed by: INTERNAL MEDICINE

## 2020-11-20 PROCEDURE — 96417 CHEMO IV INFUS EACH ADDL SEQ: CPT

## 2020-11-20 RX ORDER — SODIUM CHLORIDE 0.9 % (FLUSH) 0.9 %
10 SYRINGE (ML) INJECTION AS NEEDED
Status: DISCONTINUED | OUTPATIENT
Start: 2020-11-20 | End: 2020-11-21 | Stop reason: HOSPADM

## 2020-11-20 RX ORDER — SODIUM CHLORIDE 9 MG/ML
10 INJECTION INTRAMUSCULAR; INTRAVENOUS; SUBCUTANEOUS AS NEEDED
Status: DISCONTINUED | OUTPATIENT
Start: 2020-11-20 | End: 2020-11-21 | Stop reason: HOSPADM

## 2020-11-20 RX ORDER — SODIUM CHLORIDE 9 MG/ML
25 INJECTION, SOLUTION INTRAVENOUS CONTINUOUS
Status: DISCONTINUED | OUTPATIENT
Start: 2020-11-20 | End: 2020-11-21 | Stop reason: HOSPADM

## 2020-11-20 RX ORDER — HEPARIN 100 UNIT/ML
300-500 SYRINGE INTRAVENOUS AS NEEDED
Status: DISCONTINUED | OUTPATIENT
Start: 2020-11-20 | End: 2020-11-21 | Stop reason: HOSPADM

## 2020-11-20 RX ADMIN — PERTUZUMAB 420 MG: 30 INJECTION, SOLUTION, CONCENTRATE INTRAVENOUS at 16:05

## 2020-11-20 RX ADMIN — SODIUM CHLORIDE 25 ML/HR: 900 INJECTION, SOLUTION INTRAVENOUS at 15:28

## 2020-11-20 RX ADMIN — Medication 10 ML: at 15:00

## 2020-11-20 RX ADMIN — Medication 500 UNITS: at 16:39

## 2020-11-20 RX ADMIN — SODIUM CHLORIDE 10 ML: 9 INJECTION, SOLUTION INTRAMUSCULAR; INTRAVENOUS; SUBCUTANEOUS at 15:00

## 2020-11-20 RX ADMIN — SODIUM CHLORIDE 460 MG: 0.9 INJECTION, SOLUTION INTRAVENOUS at 15:30

## 2020-11-20 RX ADMIN — Medication 10 ML: at 16:39

## 2020-11-20 NOTE — PROGRESS NOTES
[address], [address] [place] [address], [address] [facility]  [phone]      Oncology Progress note      Patient: Claudio Zurita MRN: [id number]  SSN: [id number]    YOB: 1975  Age: 50 y.o. Sex: female        Diagnosis:     1. 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 Neoadjuvant chemotherapy   Docetaxel, Carboplatin, Trastuzumab + Pertuzumab - s/p 6 cycles  2. Left breast lumpectomy 07/09/2020  2. Adjuvant chemotherapy   Trastuzumab, Pertuzumab - Cycle 6 of 11    Subjective:      Claudio Zurita is a 50 y.o. female who I am seeing in follow up for a diagnosis of left sided invasive breast carcinoma. She felt a lump in her left breast. Dr. Sobeida Lin obtained a diagnostic mammogram. The mammogram showed an abnormality in the upper quadrant of the left breast. A biopsy of the lesion revealed a ER 0% CO 0% and Her 2 3+ Gr 3 IDC of the left breast.     Ms. Amanda Carpio completed neoadjuvant chemotherapy and underwent left breast lumpectomy on 7/9/2020. She had a pCR. She completed adjuvant radiation in September 2020. She is receiving adjuvant Herceptin/Perjeta. She feels well today with no complaints. Review of Systems:    Constitutional: negative  Eyes: negative  Ears, Nose, Mouth, Throat, and Face: negative  Respiratory: negative  Cardiovascular: negative  Gastrointestinal: negative  Genitourinary:negative  Integument/Breast: negative  Hematologic/Lymphatic: negative  Musculoskeletal:negative  Neurological: negative    Review of systems copied from previous note. Reviewed and no changes noted.        Past Medical History:   Diagnosis Date    Anxiety and depression     Cancer ([facility] [address] 01/2020    left breast ca er/pr (-) her 2 (+)     Past Surgical History:   Procedure Laterality Date    HX BREAST BIOPSY Left 07/24/2018    Left sebaceous cyst surgically removed    HX BREAST LUMPECTOMY Left 7/9/2020    LEFT BREAST WIRE-LOCALIZED LUMPECTOMY AND LEFT BREAST SENTINEL NODE BIOPSY performed by Justino Driver MD at [facility] AMBULATORY OR    HX CYST INCISION AND DRAINAGE Left 20+ yrs ago    HX HEENT      tonsillectomy    HX VASCULAR ACCESS Right 02/25/2020    right chest area    IR INSERT TUNL CVC W PORT OVER 5 YEARS  2/25/2020      Family History   Problem Relation Age of Onset    Hypertension Mother     Kidney Disease Mother     Thyroid Disease Mother      Social History     Tobacco Use    Smoking status: Never Smoker    Smokeless tobacco: Never Used   Substance Use Topics    Alcohol use: No      Prior to Admission medications    Medication Sig Start Date End Date Taking? Authorizing Provider   diphenoxylate-atropine (LOMOTIL) 2.5-0.025 mg per tablet TAKE 1 TABLET BY MOUTH 4 TIMES A DAY AS NEEDED FOR DIARRHEA *MAX 4 TABS/DAY* 8/17/20  Yes Radha Marquez NP   naloxone (NARCAN) 4 mg/actuation nasal spray Use 1 spray intranasally, then discard. Repeat with new spray every 2 min as needed for opioid overdose symptoms, alternating nostrils. 7/9/20  Yes Justino Driver MD   ethinyl estradiol-etonogestrel (NuvaRing) 0.12-0.015 mg/24 hr vaginal ring by Intravaginal route. Yes Provider, Historical   ondansetron (ZOFRAN ODT) 4 mg disintegrating tablet Take 1 Tab by mouth every eight (8) hours as needed for Nausea or Vomiting. 4/15/20  Yes Hyun Marquez NP   prochlorperazine (COMPAZINE) 10 mg tablet Take 0.5 Tabs by mouth every six (6) hours as needed for Nausea for up to 30 doses. 2/11/20  Yes Mikayla Marquez NP   lidocaine-prilocaine (EMLA) topical cream Apply  to affected area as needed for Pain. 2/11/20  Yes Mikayla Marquez NP   dexAMETHasone (DECADRON) 4 mg tablet Take 8 mg by mouth two (2) times daily (with meals). The day of chemotherapy and the day after chemotherapy.  2/11/20  Yes Micaela Griffith NP VIIBRYD 40 mg tab tablet TAKE 1 TABLET BY MOUTH EVERY DAY 6/19/18  Yes Provider, Historical          No Known Allergies      Objective:     Visit Vitals  /74   Pulse 91   Temp 97.7 °F (36.5 °C)   Resp 16   Ht 5' 6\" (1.676 m)   Wt 167 lb (75.8 kg)   SpO2 97%   BMI 26.95 kg/m²       Pain Scale: 0 - No pain/10  Pain Location:       Physical Exam:    GENERAL: alert, cooperative, no distress, appears stated age  EYE: conjunctivae/corneas clear. PERRL, EOM's intact  LYMPHATIC: Cervical, supraclavicular, and axillary nodes normal.   THROAT & NECK: normal and no erythema or exudates noted. LUNG: clear to auscultation bilaterally  HEART: regular rate and rhythm, S1, S2 normal, no murmur, click, rub or gallop  ABDOMEN: soft, non-tender. Bowel sounds normal. No masses,  no organomegaly  EXTREMITIES:  extremities normal, atraumatic, no cyanosis or edema  SKIN: Normal.  NEUROLOGIC: AOx3. Gait normal. Reflexes and motor strength normal and symmetric. Cranial nerves 2-12 and sensation grossly intact. Physical exam and ROS has been modified from a prior visit to make it relevant and current      Lab Results   Component Value Date/Time    WBC 3.7 06/12/2020 09:15 AM    HGB 10.4 (L) 06/12/2020 09:15 AM    HCT 30.7 (L) 06/12/2020 09:15 AM    PLATELET 830 12/00/0604 09:15 AM    MCV 99.4 (H) 06/12/2020 09:15 AM       Lab Results   Component Value Date/Time    Sodium 139 08/28/2020 02:14 PM    Potassium 3.5 08/28/2020 02:14 PM    Chloride 108 08/28/2020 02:14 PM    CO2 25 08/28/2020 02:14 PM    Anion gap 6 08/28/2020 02:14 PM    Glucose 92 08/28/2020 02:14 PM    BUN 22 (H) 08/28/2020 02:14 PM    Creatinine 0.94 08/28/2020 02:14 PM    BUN/Creatinine ratio 23 (H) 08/28/2020 02:14 PM    GFR est AA >60 08/28/2020 02:14 PM    GFR est non-AA >60 08/28/2020 02:14 PM    Calcium 8.7 08/28/2020 02:14 PM    Bilirubin, total 0.3 08/28/2020 02:14 PM    Alk.  phosphatase 76 08/28/2020 02:14 PM    Protein, total 6.7 08/28/2020 02:14 PM Albumin 3.2 (L) 08/28/2020 02:14 PM    Globulin 3.5 08/28/2020 02:14 PM    A-G Ratio 0.9 (L) 08/28/2020 02:14 PM    ALT (SGPT) 20 08/28/2020 02:14 PM           Assessment:     1. Left breast carcinoma:  T2 N1 M0 (Stage IIA) Invasive ductal carcinoma, Tumor size 2 cm, grade 3, ER 0%, MS 0%, Her 2 3+       ypT0 ypN0 - pCR    ECOG PS 0  Intent of Treatment - curative  Prognosis: excellent    LVEF (5/12/2020) - 50-55%    Completed neoadjuvant chemotherapy   Docetaxel, Carboplatin, Trastuzumab + Pertuzumab - s/p 6 cycles    S/p left breast lumpectomy and left SLNB on 7/9/2020  Achieved pCR    Adjuvant radiation on clinical trial - completed 9/2020 with Dr. Lakeisha Stiles    Receiving adjuvant chemotherapy   Trastuzumab, Pertuzumab - Cycle 6 of 11    Tolerating treatment very well  Denies any side effects. A detailed system by system evaluation of side effect was performed to assess adverse events. Blood counts are acceptable. Results reviewed with the patient        Plan:       · Proceed with adjuvant herceptin/perjeta  · Repeat echocardiogram  · Follow-up in 6 weeks      I performed a history and physical examination of the patient and discussed his management with the NPP. I reviewed the NPP note and agree with the documented findings and plan of care. The patient was seen in conjunction with Ms. Doris Harmon. Signed by: Kerwin Dickey MD                     December 4, 2020      CC. Sabi Yanez MD  CC.  Mariano Melvin MD

## 2020-11-20 NOTE — LETTER
12/4/20 Patient: Fela Holt YOB: 1975 Date of Visit: 11/20/2020 Amador Infante MD 
[address] [facility] [address] [address] VIA Facsimile: [phone] Dear Amador Infante MD, Thank you for referring Ms. Ceci Guillen to [address] [place] [address] for evaluation. My notes for this consultation are attached. If you have questions, please do not hesitate to call me. I look forward to following your patient along with you.  
 
 
Sincerely, 
 
Rick Sainz NP

## 2020-11-30 ENCOUNTER — APPOINTMENT (OUTPATIENT)
Dept: INFUSION THERAPY | Age: 45
End: 2020-11-30

## 2020-12-11 ENCOUNTER — HOSPITAL ENCOUNTER (OUTPATIENT)
Dept: INFUSION THERAPY | Age: 45
Discharge: HOME | End: 2020-12-11
Payer: COMMERCIAL

## 2020-12-11 ENCOUNTER — APPOINTMENT (OUTPATIENT)
Dept: INFUSION THERAPY | Age: 45
End: 2020-12-11
Payer: COMMERCIAL

## 2020-12-11 VITALS
TEMPERATURE: 97.3 F | SYSTOLIC BLOOD PRESSURE: 117 MMHG | RESPIRATION RATE: 18 BRPM | WEIGHT: 167.6 LBS | HEART RATE: 81 BPM | BODY MASS INDEX: 27.05 KG/M2 | OXYGEN SATURATION: 98 % | DIASTOLIC BLOOD PRESSURE: 69 MMHG

## 2020-12-11 DIAGNOSIS — C50.912 MALIGNANT NEOPLASM OF LEFT BREAST IN FEMALE, ESTROGEN RECEPTOR NEGATIVE, UNSPECIFIED SITE OF BREAST (HCC): Primary | ICD-10-CM

## 2020-12-11 DIAGNOSIS — Z17.1 MALIGNANT NEOPLASM OF LEFT BREAST IN FEMALE, ESTROGEN RECEPTOR NEGATIVE, UNSPECIFIED SITE OF BREAST (HCC): Primary | ICD-10-CM

## 2020-12-11 PROCEDURE — 96417 CHEMO IV INFUS EACH ADDL SEQ: CPT

## 2020-12-11 PROCEDURE — 74011000250 HC RX REV CODE- 250: Performed by: INTERNAL MEDICINE

## 2020-12-11 PROCEDURE — 77030012965 HC NDL HUBR BBMI -A

## 2020-12-11 PROCEDURE — 74011250636 HC RX REV CODE- 250/636: Performed by: INTERNAL MEDICINE

## 2020-12-11 PROCEDURE — 96413 CHEMO IV INFUSION 1 HR: CPT

## 2020-12-11 RX ORDER — SODIUM CHLORIDE 9 MG/ML
10 INJECTION INTRAMUSCULAR; INTRAVENOUS; SUBCUTANEOUS AS NEEDED
Status: DISCONTINUED | OUTPATIENT
Start: 2020-12-11 | End: 2020-12-12 | Stop reason: HOSPADM

## 2020-12-11 RX ORDER — SODIUM CHLORIDE 0.9 % (FLUSH) 0.9 %
10 SYRINGE (ML) INJECTION AS NEEDED
Status: DISCONTINUED | OUTPATIENT
Start: 2020-12-11 | End: 2020-12-12 | Stop reason: HOSPADM

## 2020-12-11 RX ORDER — HEPARIN 100 UNIT/ML
300-500 SYRINGE INTRAVENOUS AS NEEDED
Status: DISCONTINUED | OUTPATIENT
Start: 2020-12-11 | End: 2020-12-12 | Stop reason: HOSPADM

## 2020-12-11 RX ORDER — SODIUM CHLORIDE 9 MG/ML
25 INJECTION, SOLUTION INTRAVENOUS CONTINUOUS
Status: DISCONTINUED | OUTPATIENT
Start: 2020-12-11 | End: 2020-12-12 | Stop reason: HOSPADM

## 2020-12-11 RX ADMIN — SODIUM CHLORIDE 460 MG: 9 INJECTION, SOLUTION INTRAVENOUS at 15:39

## 2020-12-11 RX ADMIN — PERTUZUMAB 420 MG: 30 INJECTION, SOLUTION, CONCENTRATE INTRAVENOUS at 16:15

## 2020-12-11 RX ADMIN — SODIUM CHLORIDE 10 ML: 9 INJECTION, SOLUTION INTRAMUSCULAR; INTRAVENOUS; SUBCUTANEOUS at 15:16

## 2020-12-11 RX ADMIN — Medication 10 ML: at 16:50

## 2020-12-11 RX ADMIN — SODIUM CHLORIDE 25 ML/HR: 900 INJECTION, SOLUTION INTRAVENOUS at 15:19

## 2020-12-11 RX ADMIN — HEPARIN 500 UNITS: 100 SYRINGE at 16:50

## 2020-12-11 NOTE — PROGRESS NOTES
[facility] VISIT NOTE    1505. Pt arrived at [facility] ambulatory and in no distress for C13 Trazimera/Perjeta. Assessment completed, pt with no acute complaints or concerns. Covid Questionnaire completed. 1. Do you have any symptoms of covid 19? SOB, coughing, fever, or generally not feeling well? - no  2. Have you been exposed to covid 19 recently? - no  3. Have you had any recent contact with family/friend that has a pending covid test? no    RCW chest port accessed with . 75 in zaman with no difficulty. Positive blood return noted and flushes easily. No labs to be drawn today per order. EF 55-60% 9/18/20. Medications received:  NS KVO  Trazimera IV  Perjeta IV    Tolerated treatment well, no adverse reaction noted. Port de-accessed and flushed per protocol. Positive blood return noted. Patient Vitals for the past 12 hrs:   Temp Pulse Resp BP SpO2   12/11/20 1646 -- 81 -- 117/69 --   12/11/20 1508 97.3 °F (36.3 °C) 76 18 130/73 98 %     1650. D/C'd from [facility] ambulatory and in no distress.  Next appointment is 1/4/21 at 3:00 pm.

## 2020-12-18 ENCOUNTER — APPOINTMENT (OUTPATIENT)
Dept: INFUSION THERAPY | Age: 45
End: 2020-12-18

## 2020-12-29 ENCOUNTER — HOSPITAL ENCOUNTER (OUTPATIENT)
Dept: NON INVASIVE DIAGNOSTICS | Age: 45
Discharge: HOME | End: 2020-12-29
Attending: INTERNAL MEDICINE
Payer: COMMERCIAL

## 2020-12-29 DIAGNOSIS — Z79.899 ENCOUNTER FOR MONITORING CARDIOTOXIC DRUG THERAPY: ICD-10-CM

## 2020-12-29 DIAGNOSIS — Z17.1 MALIGNANT NEOPLASM OF LEFT BREAST IN FEMALE, ESTROGEN RECEPTOR NEGATIVE, UNSPECIFIED SITE OF BREAST (HCC): ICD-10-CM

## 2020-12-29 DIAGNOSIS — C50.912 MALIGNANT NEOPLASM OF LEFT BREAST IN FEMALE, ESTROGEN RECEPTOR NEGATIVE, UNSPECIFIED SITE OF BREAST (HCC): ICD-10-CM

## 2020-12-29 DIAGNOSIS — Z51.81 ENCOUNTER FOR MONITORING CARDIOTOXIC DRUG THERAPY: ICD-10-CM

## 2020-12-29 LAB
ECHO AO ROOT DIAM: 2.73 CM
ECHO AV AREA PEAK VELOCITY: 2.07 CM2
ECHO AV PEAK GRADIENT: 4.93 MMHG
ECHO AV PEAK VELOCITY: 111.05 CM/S
ECHO EST RA PRESSURE: 10 MMHG
ECHO LA AREA 4C: 11.89 CM2
ECHO LA MAJOR AXIS: 2.69 CM
ECHO LA VOL 2C: 27.88 ML (ref 22–52)
ECHO LA VOL 4C: 24.54 ML (ref 22–52)
ECHO LA VOL BP: 28.53 ML (ref 22–52)
ECHO LV E' LATERAL VELOCITY: 12.56 CM/S
ECHO LV E' SEPTAL VELOCITY: 7.5 CM/S
ECHO LV INTERNAL DIMENSION DIASTOLIC MMODE: 3.3 CM
ECHO LV INTERNAL DIMENSION DIASTOLIC: 3.13 CM (ref 3.9–5.3)
ECHO LV INTERNAL DIMENSION SYSTOLIC MMODE: 2.11 CM
ECHO LV INTERNAL DIMENSION SYSTOLIC: 2.18 CM
ECHO LV IVSD MMODE: 1.03 CM
ECHO LV IVSD: 1.43 CM (ref 0.6–0.9)
ECHO LV IVSS MMODE: 1.32 CM
ECHO LV IVSS: 1.39 CM
ECHO LV MASS 2D: 129.2 G (ref 67–162)
ECHO LV POSTERIOR WALL DIASTOLIC MMODE: 1.24 CM
ECHO LV POSTERIOR WALL DIASTOLIC: 1.14 CM (ref 0.6–0.9)
ECHO LV POSTERIOR WALL SYSTOLIC: 1.7 CM
ECHO LVOT DIAM: 1.81 CM
ECHO LVOT PEAK GRADIENT: 3.23 MMHG
ECHO LVOT PEAK VELOCITY: 89.91 CM/S
ECHO MV A VELOCITY: 60.79 CM/S
ECHO MV E DECELERATION TIME (DT): 153.67 MS
ECHO MV E VELOCITY: 73.2 CM/S
ECHO MV E/A RATIO: 1.2
ECHO MV E/E' LATERAL: 5.83
ECHO MV E/E' RATIO (AVERAGED): 7.79
ECHO MV E/E' SEPTAL: 9.76
ECHO PV MAX VELOCITY: 104.72 CM/S
ECHO PV PEAK INSTANTANEOUS GRADIENT SYSTOLIC: 4.4 MMHG
ECHO RIGHT VENTRICULAR SYSTOLIC PRESSURE (RVSP): 24.91 MMHG
ECHO RV INTERNAL DIMENSION: 2.59 CM
ECHO TV REGURGITANT MAX VELOCITY: 192.77 CM/S
ECHO TV REGURGITANT PEAK GRADIENT: 14.91 MMHG

## 2020-12-29 PROCEDURE — 93308 TTE F-UP OR LMTD: CPT

## 2020-12-31 NOTE — PROGRESS NOTES
is a 50 y.o. female here for follow up for left breast cancer. Treatment today:  Cycle 14 Day 1 Carboplatin/ Docetaxel/ Pertuzumab/Trastuzumab    1. Have you been to the ER, urgent care clinic since your last visit? Hospitalized since your last visit? No    2. Have you seen or consulted any other health care providers outside of the [address] [place] [address] since your last visit? Include any pap smears or colon screening. no    Pt states she has been doing well. No complaints.

## 2021-01-04 ENCOUNTER — OFFICE VISIT (OUTPATIENT)
Dept: ONCOLOGY | Age: 46
End: 2021-01-04
Payer: COMMERCIAL

## 2021-01-04 ENCOUNTER — HOSPITAL ENCOUNTER (OUTPATIENT)
Dept: INFUSION THERAPY | Age: 46
Discharge: HOME | End: 2021-01-04
Payer: COMMERCIAL

## 2021-01-04 VITALS
HEART RATE: 65 BPM | HEIGHT: 66 IN | TEMPERATURE: 97.1 F | RESPIRATION RATE: 18 BRPM | BODY MASS INDEX: 27.31 KG/M2 | DIASTOLIC BLOOD PRESSURE: 74 MMHG | SYSTOLIC BLOOD PRESSURE: 121 MMHG | WEIGHT: 169.9 LBS | OXYGEN SATURATION: 96 %

## 2021-01-04 VITALS
HEIGHT: 66 IN | DIASTOLIC BLOOD PRESSURE: 66 MMHG | WEIGHT: 169 LBS | OXYGEN SATURATION: 96 % | RESPIRATION RATE: 18 BRPM | BODY MASS INDEX: 27.16 KG/M2 | SYSTOLIC BLOOD PRESSURE: 104 MMHG | TEMPERATURE: 97.1 F | HEART RATE: 83 BPM

## 2021-01-04 DIAGNOSIS — Z09 CHEMOTHERAPY FOLLOW-UP EXAMINATION: ICD-10-CM

## 2021-01-04 DIAGNOSIS — Z17.1 MALIGNANT NEOPLASM OF LEFT BREAST IN FEMALE, ESTROGEN RECEPTOR NEGATIVE, UNSPECIFIED SITE OF BREAST (HCC): Primary | ICD-10-CM

## 2021-01-04 DIAGNOSIS — C50.912 HER2-POSITIVE CARCINOMA OF LEFT BREAST (HCC): ICD-10-CM

## 2021-01-04 DIAGNOSIS — C50.912 MALIGNANT NEOPLASM OF LEFT BREAST IN FEMALE, ESTROGEN RECEPTOR NEGATIVE, UNSPECIFIED SITE OF BREAST (HCC): Primary | ICD-10-CM

## 2021-01-04 PROCEDURE — 99215 OFFICE O/P EST HI 40 MIN: CPT | Performed by: INTERNAL MEDICINE

## 2021-01-04 PROCEDURE — 74011000250 HC RX REV CODE- 250: Performed by: INTERNAL MEDICINE

## 2021-01-04 PROCEDURE — 74011250636 HC RX REV CODE- 250/636: Performed by: INTERNAL MEDICINE

## 2021-01-04 PROCEDURE — 96413 CHEMO IV INFUSION 1 HR: CPT

## 2021-01-04 PROCEDURE — 77030012965 HC NDL HUBR BBMI -A

## 2021-01-04 PROCEDURE — 96417 CHEMO IV INFUS EACH ADDL SEQ: CPT

## 2021-01-04 RX ORDER — HEPARIN 100 UNIT/ML
300-500 SYRINGE INTRAVENOUS AS NEEDED
Status: DISCONTINUED | OUTPATIENT
Start: 2021-01-04 | End: 2021-01-05 | Stop reason: HOSPADM

## 2021-01-04 RX ORDER — SODIUM CHLORIDE 9 MG/ML
25 INJECTION, SOLUTION INTRAVENOUS CONTINUOUS
Status: DISCONTINUED | OUTPATIENT
Start: 2021-01-04 | End: 2021-01-05 | Stop reason: HOSPADM

## 2021-01-04 RX ORDER — SODIUM CHLORIDE 0.9 % (FLUSH) 0.9 %
10 SYRINGE (ML) INJECTION AS NEEDED
Status: DISCONTINUED | OUTPATIENT
Start: 2021-01-04 | End: 2021-01-05 | Stop reason: HOSPADM

## 2021-01-04 RX ORDER — SODIUM CHLORIDE 9 MG/ML
10 INJECTION INTRAMUSCULAR; INTRAVENOUS; SUBCUTANEOUS AS NEEDED
Status: DISCONTINUED | OUTPATIENT
Start: 2021-01-04 | End: 2021-01-05 | Stop reason: HOSPADM

## 2021-01-04 RX ADMIN — SODIUM CHLORIDE 460 MG: 9 INJECTION, SOLUTION INTRAVENOUS at 16:00

## 2021-01-04 RX ADMIN — SODIUM CHLORIDE 25 ML/HR: 900 INJECTION, SOLUTION INTRAVENOUS at 16:00

## 2021-01-04 RX ADMIN — Medication 500 UNITS: at 17:08

## 2021-01-04 RX ADMIN — SODIUM CHLORIDE 10 ML: 9 INJECTION, SOLUTION INTRAMUSCULAR; INTRAVENOUS; SUBCUTANEOUS at 15:28

## 2021-01-04 RX ADMIN — Medication 10 ML: at 17:08

## 2021-01-04 RX ADMIN — PERTUZUMAB 420 MG: 30 INJECTION, SOLUTION, CONCENTRATE INTRAVENOUS at 16:35

## 2021-01-04 NOTE — PROGRESS NOTES
[address], [address] [place] [address] Derek Samuel, 200 [facility]  [phone]      Oncology Progress note      Patient: Jane Anders MRN: [id number]  SSN: [id number]    YOB: 1975  Age: 50 y.o. Sex: female        Diagnosis:     1. Left breast carcinoma: Dx: 1/31/2020  T2 N1 M0 (Stage IIA) Invasive ductal carcinoma, Tumor size 2 cm, grade 3, ER 0%, NH 0%, Her 2 3+     ypT0 ypN0 - pCR    Treatment:     1. Neoadjuvant chemotherapy   Docetaxel, Carboplatin, Trastuzumab + Pertuzumab - s/p 6 cycles  2. Left breast lumpectomy 07/09/2020  2. Adjuvant chemotherapy   Trastuzumab, Pertuzumab - Cycle 8 of 11    Subjective:      Jane Anders is a 50 y.o. female who I am seeing in follow up for a diagnosis of left sided invasive breast carcinoma. She felt a lump in her left breast. Dr. James Bhakta obtained a diagnostic mammogram. The mammogram showed an abnormality in the upper quadrant of the left breast. A biopsy of the lesion revealed a ER 0% NH 0% and Her 2 3+ Gr 3 IDC of the left breast.     Ms. Edward Salazar completed neoadjuvant chemotherapy and underwent left breast lumpectomy on 7/9/2020. She had a pCR. She completed adjuvant radiation in September 2020. She is receiving adjuvant Herceptin/Perjeta. She continues to have intermittent diarrhea. She also is complaining of left arm tightness when raising her arm. Her mammogram is scheduled for next month. Review of Systems:    Constitutional: negative  Eyes: negative  Ears, Nose, Mouth, Throat, and Face: negative  Respiratory: negative  Cardiovascular: negative  Gastrointestinal: negative  Genitourinary:negative  Integument/Breast: negative  Hematologic/Lymphatic: negative  Musculoskeletal: left arm tightness when trying to raise her arm  Neurological: negative    ROS was pulled in from a previous visit. No changes were made d/t symptoms remain the same.       Past Medical History:   Diagnosis Date    Anxiety and depression     Cancer ([facility] [address] 01/2020    left breast ca er/pr (-) her 2 (+)     Past Surgical History:   Procedure Laterality Date    HX BREAST BIOPSY Left 07/24/2018    Left sebaceous cyst surgically removed    HX BREAST LUMPECTOMY Left 7/9/2020    LEFT BREAST WIRE-LOCALIZED LUMPECTOMY AND LEFT BREAST SENTINEL NODE BIOPSY performed by Chris Pedroza MD at [facility] AMBULATORY OR    HX CYST INCISION AND DRAINAGE Left 20+ yrs ago    HX HEENT      tonsillectomy    HX VASCULAR ACCESS Right 02/25/2020    right chest area    IR INSERT TUNL CVC W PORT OVER 5 YEARS  2/25/2020      Family History   Problem Relation Age of Onset    Hypertension Mother     Kidney Disease Mother     Thyroid Disease Mother      Social History     Tobacco Use    Smoking status: Never Smoker    Smokeless tobacco: Never Used   Substance Use Topics    Alcohol use: No      Prior to Admission medications    Medication Sig Start Date End Date Taking? Authorizing Provider   diphenoxylate-atropine (LOMOTIL) 2.5-0.025 mg per tablet TAKE 1 TABLET BY MOUTH 4 TIMES A DAY AS NEEDED FOR DIARRHEA *MAX 4 TABS/DAY* 8/17/20  Yes Favian Marquez NP   naloxone (NARCAN) 4 mg/actuation nasal spray Use 1 spray intranasally, then discard. Repeat with new spray every 2 min as needed for opioid overdose symptoms, alternating nostrils. 7/9/20  Yes Chris Pedroza MD   ethinyl estradiol-etonogestrel (NuvaRing) 0.12-0.015 mg/24 hr vaginal ring by Intravaginal route. Yes Provider, Historical   ondansetron (ZOFRAN ODT) 4 mg disintegrating tablet Take 1 Tab by mouth every eight (8) hours as needed for Nausea or Vomiting. 4/15/20  Yes Beth Marquez NP   prochlorperazine (COMPAZINE) 10 mg tablet Take 0.5 Tabs by mouth every six (6) hours as needed for Nausea for up to 30 doses. 2/11/20  Yes Mikayla Marquez NP   lidocaine-prilocaine (EMLA) topical cream Apply  to affected area as needed for Pain.  2/11/20  Yes Victor Hugo Noel NP dexAMETHasone (DECADRON) 4 mg tablet Take 8 mg by mouth two (2) times daily (with meals). The day of chemotherapy and the day after chemotherapy. 2/11/20  Yes Mikayla Marquez, NP   VIIBRYD 40 mg tab tablet TAKE 1 TABLET BY MOUTH EVERY DAY 6/19/18  Yes Provider, Historical        No Known Allergies      Objective:     Visit Vitals  /66   Pulse 83   Temp 97.1 °F (36.2 °C)   Resp 18   Ht 5' 6\" (1.676 m)   Wt 169 lb (76.7 kg)   SpO2 96%   BMI 27.28 kg/m²       Physical Exam:    GENERAL: alert, cooperative, no distress, appears stated age  EYE: conjunctivae/corneas clear. LYMPHATIC: Cervical, supraclavicular, and axillary nodes normal.   THROAT & NECK: normal and no erythema or exudates noted. LUNG: clear to auscultation bilaterally  HEART: regular rate and rhythm  ABDOMEN: soft, non-tender. Bowel sounds normal.  EXTREMITIES:  No edema  SKIN: Normal.  NEUROLOGIC: AOx3. Gait normal.      Physical exam and ROS has been modified from a prior visit to make it relevant and current        Assessment:     1. Left breast carcinoma:  T2 N1 M0 (Stage IIA) Invasive ductal carcinoma, Tumor size 2 cm, grade 3, ER 0%, NM 0%, Her 2 3+       ypT0 ypN0 - pCR    ECOG PS 0  Intent of Treatment - curative  Prognosis: excellent     LVEF (12/29/20) - 55-60%    Completed neoadjuvant chemotherapy   Docetaxel, Carboplatin, Trastuzumab + Pertuzumab - s/p 6 cycles    S/p left breast lumpectomy and left SLNB on 7/9/2020  Achieved pCR    Adjuvant radiation on clinical trial - completed 9/2020 with Dr. Romulo Rivera    Receiving adjuvant chemotherapy   Trastuzumab, Pertuzumab - Cycle 8 of 11    Tolerating treatment very well  Denies any side effects. A detailed system by system evaluation of side effect was performed to assess adverse events. Blood counts are acceptable.  Results reviewed with the patient      Plan:       · Proceed with adjuvant herceptin/perjeta  · Follow-up in 6 weeks      I performed a history and physical examination of the patient and discussed his management with the NPP. I reviewed the NPP note and agree with the documented findings and plan of care. The patient was seen in conjunction with Ms. Marquez. Signed by: Gilbert Keith MD                     January 4, 2021      CC. Lamberto Pino MD  CC.  Isela Anand MD

## 2021-01-04 NOTE — LETTER
1/4/2021 Patient: Penelope Smith YOB: 1975 Date of Visit: 1/4/2021 Lj Murray MD 
[address] [facility] [address] [address] Via Fax: [phone] Dear Lj Murray MD, Thank you for referring Ms. Hazel Romero to [address] [place] [address] for evaluation. My notes for this consultation are attached. If you have questions, please do not hesitate to call me. I look forward to following your patient along with you.  
 
 
Sincerely, 
 
Marah Bejarano NP

## 2021-01-04 NOTE — PROGRESS NOTES
Pt arrived to [facility] in no acute distress at 0510 for Perjeta/Trazimera C14.  Assessment unremarkable. R chest port accessed without issue and positive blood return noted. Visit Vitals  /66 (BP 1 Location: Left arm, BP Patient Position: Sitting)   Pulse 83   Temp 97.1 °F (36.2 °C)   Resp 18   Ht 5' 6\" (1.676 m)   Wt 77.1 kg (169 lb 14.4 oz)   SpO2 96%   Breastfeeding No   BMI 27.42 kg/m²       The following medications administered:  [email or web address]  Trazimera IV  Perjeta IV    Visit Vitals  /74   Pulse 65   Temp 97.1 °F (36.2 °C)   Resp 18   Ht 5' 6\" (1.676 m)   Wt 77.1 kg (169 lb 14.4 oz)   SpO2 96%   Breastfeeding No   BMI 27.42 kg/m²       Pt tolerated treatment well. Port flushed per policy and de-accessed, 2x2 and tape placed.  Pt discharged ambulatory in no acute distress at 1710, accompanied by self. Next appointment 1/22/21 at 1500.

## 2021-01-22 ENCOUNTER — HOSPITAL ENCOUNTER (OUTPATIENT)
Dept: INFUSION THERAPY | Age: 46
Discharge: HOME | End: 2021-01-22
Payer: COMMERCIAL

## 2021-01-22 VITALS
TEMPERATURE: 97.8 F | DIASTOLIC BLOOD PRESSURE: 71 MMHG | WEIGHT: 170 LBS | HEART RATE: 66 BPM | SYSTOLIC BLOOD PRESSURE: 112 MMHG | OXYGEN SATURATION: 98 % | BODY MASS INDEX: 27.44 KG/M2 | RESPIRATION RATE: 16 BRPM

## 2021-01-22 DIAGNOSIS — C50.912 MALIGNANT NEOPLASM OF LEFT BREAST IN FEMALE, ESTROGEN RECEPTOR NEGATIVE, UNSPECIFIED SITE OF BREAST (HCC): Primary | ICD-10-CM

## 2021-01-22 DIAGNOSIS — Z17.1 MALIGNANT NEOPLASM OF LEFT BREAST IN FEMALE, ESTROGEN RECEPTOR NEGATIVE, UNSPECIFIED SITE OF BREAST (HCC): Primary | ICD-10-CM

## 2021-01-22 PROCEDURE — 77030012965 HC NDL HUBR BBMI -A

## 2021-01-22 PROCEDURE — 96417 CHEMO IV INFUS EACH ADDL SEQ: CPT

## 2021-01-22 PROCEDURE — 96413 CHEMO IV INFUSION 1 HR: CPT

## 2021-01-22 PROCEDURE — 74011250636 HC RX REV CODE- 250/636: Performed by: INTERNAL MEDICINE

## 2021-01-22 RX ORDER — SODIUM CHLORIDE 9 MG/ML
25 INJECTION, SOLUTION INTRAVENOUS CONTINUOUS
Status: DISCONTINUED | OUTPATIENT
Start: 2021-01-22 | End: 2021-01-23 | Stop reason: HOSPADM

## 2021-01-22 RX ORDER — SODIUM CHLORIDE 0.9 % (FLUSH) 0.9 %
10 SYRINGE (ML) INJECTION AS NEEDED
Status: DISCONTINUED | OUTPATIENT
Start: 2021-01-22 | End: 2021-01-23 | Stop reason: HOSPADM

## 2021-01-22 RX ORDER — SODIUM CHLORIDE 9 MG/ML
10 INJECTION INTRAMUSCULAR; INTRAVENOUS; SUBCUTANEOUS AS NEEDED
Status: DISCONTINUED | OUTPATIENT
Start: 2021-01-22 | End: 2021-01-23 | Stop reason: HOSPADM

## 2021-01-22 RX ORDER — HEPARIN 100 UNIT/ML
300-500 SYRINGE INTRAVENOUS AS NEEDED
Status: DISCONTINUED | OUTPATIENT
Start: 2021-01-22 | End: 2021-01-23 | Stop reason: HOSPADM

## 2021-01-22 RX ADMIN — PERTUZUMAB 420 MG: 30 INJECTION, SOLUTION, CONCENTRATE INTRAVENOUS at 15:55

## 2021-01-22 RX ADMIN — SODIUM CHLORIDE 25 ML/HR: 900 INJECTION, SOLUTION INTRAVENOUS at 15:22

## 2021-01-22 RX ADMIN — Medication 10 ML: at 16:27

## 2021-01-22 RX ADMIN — Medication 500 UNITS: at 16:28

## 2021-01-22 RX ADMIN — SODIUM CHLORIDE 460 MG: 9 INJECTION, SOLUTION INTRAVENOUS at 15:21

## 2021-01-22 NOTE — PROGRESS NOTES
[facility] Progress Note    Date: 2021    Name: Jane Anders    MRN: [id number]         : 1975    Ms. Edward Salazar arrived ambulatory at 1500 and in no distress for C19 Trazimera/Perjeta. Assessment was completed, no acute issues at this time, no new complaints voiced. Covid Screening      1. Do you have any symptoms of COVID-19? SOB, coughing, fever, or generally not feeling well ? No  2. Have you been exposed to COVID-19 recently? No  3. Have you had any recent contact with family/friend that has a pending COVID test? No    Venous Access Device Vaccess CT power port ref#[id number], lot#[id number] (Active)   Central Line Being Utilized Yes 21 1500   Criteria for Appropriate Use Irritant/vesicant medication 21 1500   Site Assessment Clean, dry, & intact 21 1500   Date of Last Dressing Change 21 1500   Dressing Status New 21 1500   Dressing Type Transparent 21 1500   Action Taken Open ports on tubing capped 21 1500   Date Accessed (Medial Site) 21 1500   Access Time (Medial Site) 1515 21 1500   Access Needle Size (Site #1) 20 G 21 1500   Access Needle Length (Medial Site) 0.75 inches 21 1500   Positive Blood Return (Medial Site) Yes 21 1500   Action Taken (Medial Site) Flushed; Infusing 21 1500   Alcohol Cap Used Yes 20 1500    + blood return noted, labs drawn and sent. Ms. Charles Warner vitals were reviewed. Patient Vitals for the past 12 hrs:   Temp Pulse Resp BP SpO2   21 1627 -- 66 16 112/71 --   21 1508 97.8 °F (36.6 °C) 69 16 115/67 98 %       Lab results were obtained and reviewed. Echo 2020 60-65%    Pre-medications  were administered as ordered and chemotherapy was initiated.      Medication:  Medications Administered     0.9% sodium chloride infusion     Admin Date  2021 Action  New Bag Dose  25 mL/hr Rate  25 mL/hr Route  IntraVENous Administered By  Hubert Dixon heparin (porcine) pf 300-500 Units     Admin Date  01/22/2021 Action  Given Dose  500 Units Route  InterCATHeter Administered By  Bob Baize A          pertuzumab (PERJETA) 420 mg in 0.9% sodium chloride 250 mL, overfill volume 25 mL IVPB     Admin Date  01/22/2021 Action  New Bag Dose  420 mg Rate  578 mL/hr Route  IntraVENous Administered By  Bob Baize A          saline peripheral flush soln 10 mL     Admin Date  01/22/2021 Action  Given Dose  10 mL Route  InterCATHeter Administered By  Bob Baize A          trastuzumab-qyyp (TRAZIMERA) 460 mg in 0.9% sodium chloride 250 mL, overfill volume 25 mL IVPB     Admin Date  01/22/2021 Action  Given Dose  460 mg Rate  593.8 mL/hr Route  IntraVENous Administered By  Bob Baize A              Patient port flushed; heparinized; and de-accessed per protocol. Ms. Lonnie Harrison tolerated treatment well and was discharged from [place] [address] in stable condition at 1630. She is aware of when to return for her next appointment.     Future Appointments   Date Time Provider Viri Gonzales   1/22/2021  3:00 PM Morgan FT CHAIR 1 [facility]3]   2/12/2021  9:45 AM [facility] 2 [facility]   2/12/2021  3:00 PM Morgan FT CHAIR 1 [facility]   2/12/2021  3:15 PM Negra Rose NP ONC BS AMB   3/5/2021  3:00 PM Morgan FT CHAIR 1 [facility]   4/5/2021  8:30 AM Ilsa Serra NP [facility] [place] BS AMB       Jany Baron  January 22, 2021

## 2021-02-11 NOTE — PROGRESS NOTES
Bob Ordonez is a 50 y.o. female here for follow up for left breast cancer. Treatment today:  Cycle 16 Day 1 Carboplatin + Dodetaxel + Treastuzumab +Pertuzumab    1. Have you been to the ER, urgent care clinic since your last visit? Hospitalized since your last visit? no    2. Have you seen or consulted any other health care providers outside of the [address] [place] [address] since your last visit? Include any pap smears or colon screening. no    Pt has a mammogram today. States she is doing ok. No complaints. She has had her 1st COVID vaccine shot.

## 2021-02-12 ENCOUNTER — HOSPITAL ENCOUNTER (OUTPATIENT)
Dept: MAMMOGRAPHY | Age: 46
Discharge: HOME | End: 2021-02-12
Attending: NURSE PRACTITIONER
Payer: COMMERCIAL

## 2021-02-12 ENCOUNTER — TELEPHONE (OUTPATIENT)
Dept: SURGERY | Age: 46
End: 2021-02-12

## 2021-02-12 ENCOUNTER — OFFICE VISIT (OUTPATIENT)
Dept: ONCOLOGY | Age: 46
End: 2021-02-12
Payer: COMMERCIAL

## 2021-02-12 ENCOUNTER — HOSPITAL ENCOUNTER (OUTPATIENT)
Dept: INFUSION THERAPY | Age: 46
Discharge: HOME | End: 2021-02-12
Payer: COMMERCIAL

## 2021-02-12 VITALS
WEIGHT: 169 LBS | OXYGEN SATURATION: 98 % | BODY MASS INDEX: 27.16 KG/M2 | DIASTOLIC BLOOD PRESSURE: 71 MMHG | HEIGHT: 66 IN | RESPIRATION RATE: 16 BRPM | HEART RATE: 66 BPM | SYSTOLIC BLOOD PRESSURE: 112 MMHG | TEMPERATURE: 97.8 F

## 2021-02-12 VITALS
HEIGHT: 66 IN | WEIGHT: 169.6 LBS | BODY MASS INDEX: 27.26 KG/M2 | DIASTOLIC BLOOD PRESSURE: 76 MMHG | RESPIRATION RATE: 16 BRPM | OXYGEN SATURATION: 98 % | SYSTOLIC BLOOD PRESSURE: 121 MMHG | HEART RATE: 81 BPM | TEMPERATURE: 97.5 F

## 2021-02-12 DIAGNOSIS — C50.912 MALIGNANT NEOPLASM OF LEFT BREAST IN FEMALE, ESTROGEN RECEPTOR NEGATIVE, UNSPECIFIED SITE OF BREAST (HCC): Primary | ICD-10-CM

## 2021-02-12 DIAGNOSIS — Z09 CHEMOTHERAPY FOLLOW-UP EXAMINATION: ICD-10-CM

## 2021-02-12 DIAGNOSIS — C50.912 HER2-POSITIVE CARCINOMA OF LEFT BREAST (HCC): ICD-10-CM

## 2021-02-12 DIAGNOSIS — Z17.1 MALIGNANT NEOPLASM OF LEFT BREAST IN FEMALE, ESTROGEN RECEPTOR NEGATIVE, UNSPECIFIED SITE OF BREAST (HCC): Primary | ICD-10-CM

## 2021-02-12 DIAGNOSIS — R92.8 ABNORMAL FINDING ON BREAST IMAGING: Primary | ICD-10-CM

## 2021-02-12 DIAGNOSIS — Z85.3 HISTORY OF BREAST CANCER IN FEMALE: ICD-10-CM

## 2021-02-12 PROCEDURE — 77062 BREAST TOMOSYNTHESIS BI: CPT

## 2021-02-12 PROCEDURE — 99215 OFFICE O/P EST HI 40 MIN: CPT | Performed by: INTERNAL MEDICINE

## 2021-02-12 PROCEDURE — 96417 CHEMO IV INFUS EACH ADDL SEQ: CPT

## 2021-02-12 PROCEDURE — 74011250636 HC RX REV CODE- 250/636: Performed by: INTERNAL MEDICINE

## 2021-02-12 PROCEDURE — 77030012965 HC NDL HUBR BBMI -A

## 2021-02-12 PROCEDURE — 96413 CHEMO IV INFUSION 1 HR: CPT

## 2021-02-12 RX ORDER — HEPARIN 100 UNIT/ML
300-500 SYRINGE INTRAVENOUS AS NEEDED
Status: DISCONTINUED | OUTPATIENT
Start: 2021-02-12 | End: 2021-02-13 | Stop reason: HOSPADM

## 2021-02-12 RX ORDER — SODIUM CHLORIDE 9 MG/ML
25 INJECTION, SOLUTION INTRAVENOUS CONTINUOUS
Status: DISPENSED | OUTPATIENT
Start: 2021-02-12 | End: 2021-02-12

## 2021-02-12 RX ORDER — SODIUM CHLORIDE 0.9 % (FLUSH) 0.9 %
10 SYRINGE (ML) INJECTION AS NEEDED
Status: DISCONTINUED | OUTPATIENT
Start: 2021-02-12 | End: 2021-02-13 | Stop reason: HOSPADM

## 2021-02-12 RX ORDER — SODIUM CHLORIDE 9 MG/ML
10 INJECTION INTRAMUSCULAR; INTRAVENOUS; SUBCUTANEOUS AS NEEDED
Status: DISCONTINUED | OUTPATIENT
Start: 2021-02-12 | End: 2021-02-13 | Stop reason: HOSPADM

## 2021-02-12 RX ADMIN — Medication 500 UNITS: at 13:34

## 2021-02-12 RX ADMIN — Medication 10 ML: at 12:20

## 2021-02-12 RX ADMIN — PERTUZUMAB 420 MG: 30 INJECTION, SOLUTION, CONCENTRATE INTRAVENOUS at 12:56

## 2021-02-12 RX ADMIN — Medication 10 ML: at 13:34

## 2021-02-12 RX ADMIN — SODIUM CHLORIDE 10 ML: 9 INJECTION INTRAMUSCULAR; INTRAVENOUS; SUBCUTANEOUS at 12:20

## 2021-02-12 RX ADMIN — SODIUM CHLORIDE 460 MG: 9 INJECTION, SOLUTION INTRAVENOUS at 12:22

## 2021-02-12 RX ADMIN — SODIUM CHLORIDE 25 ML/HR: 9 INJECTION, SOLUTION INTRAVENOUS at 12:15

## 2021-02-12 NOTE — PROGRESS NOTES
[facility] Progress Note    Date: 2021    Name: Carol Barr    MRN: [id number]         : 1975    Ms. Chinyere Leigh arrived ambulatory at 1200 and in no distress for C16 Trazimera/Perjeta. Assessment was completed, no acute issues at this time, no new complaints voiced. Covid Screening      1. Do you have any symptoms of COVID-19? SOB, coughing, fever, or generally not feeling well ? no  2. Have you been exposed to COVID-19 recently? no  3. Have you had any recent contact with family/friend that has a pending COVID test? no    Venous Access Device Vaccess CT power port ref#[id number], lot#[id number] 20 (Active)   Central Line Being Utilized Yes 21 1200   Criteria for Appropriate Use Irritant/vesicant medication 21 1200   Site Assessment Clean, dry, & intact 21 1200   Date of Last Dressing Change 21 1200   Dressing Status New 21 1200   Dressing Type Transparent 21 1200   Action Taken Open ports on tubing capped 21 1200   Date Accessed (Medial Site) 21 1200   Access Time (Medial Site) 1215 21 1200   Access Needle Size (Site #1) 20 G 21 1200   Access Needle Length (Medial Site) 0.75 inches 21 1200   Positive Blood Return (Medial Site) Yes 21 1200   Action Taken (Medial Site) Flushed; Infusing 21 1200   Alcohol Cap Used Yes 20 1500      + blood return noted, labs drawn and sent. Proceeded to appt with Dr. Ms. Aleksandr Moreira vitals were reviewed. Patient Vitals for the past 12 hrs:   Temp Pulse Resp BP SpO2   21 1332 -- 81 16 121/76 --   21 1214 97.5 °F (36.4 °C) 90 16 131/72 98 %       Lab results were obtained and reviewed. Ejection Fraction: 55-60% 2020    Pre-medications  were administered as ordered and chemotherapy was initiated.      Medication:  Medications Administered     0.9% sodium chloride infusion     Admin Date  2021 Action  New Bag Dose  25 mL/hr Rate  25 mL/hr Route  IntraVENous Administered By  Britni LAWLER          0.9% sodium chloride injection 10 mL     Admin Date  02/12/2021 Action  Given Dose  10 mL Route  IntraVENous Administered By  Britni LAWLER          heparin (porcine) pf 300-500 Units     Admin Date  02/12/2021 Action  Given Dose  500 Units Route  InterCATHeter Administered By  Janett Bauer RN          pertuzumab (PERJETA) 420 mg in 0.9% sodium chloride 250 mL, overfill volume 25 mL IVPB     Admin Date  02/12/2021 Action  New Bag Dose  420 mg Rate  578 mL/hr Route  IntraVENous Administered By  Britni LAWLER          saline peripheral flush soln 10 mL     Admin Date  02/12/2021 Action  Given Dose  10 mL Route  InterCATHeter Administered By  Darci De La Garzahy Date  02/12/2021 Action  Given Dose  10 mL Route  InterCATHeter Administered By  Janett Bauer RN          trastuzumab-qyyp (TRAZIMERA) 460 mg in 0.9% sodium chloride 250 mL, overfill volume 25 mL IVPB     Admin Date  02/12/2021 Action  Given Dose  460 mg Rate  593.8 mL/hr Route  IntraVENous Administered By  Britni LAWLER              Patient port flushed; heparinized; and de-accessed per protocol. Ms. Margarita Tomlinson tolerated treatment well and was discharged from [place] [address] in stable condition at 1335. She is aware of when to return for her next appointment.     Future Appointments   Date Time Provider Viri Mesai   2/12/2021  3:00 PM [place] FT CHAIR 1 [facility] REG   2/12/2021  3:15 PM Claudette Ellis, NP ONC BS AMB   3/5/2021  3:00 PM [place] FT CHAIR 1 [facility] REG   3/26/2021  3:00 PM [place] FT CHAIR 1 [facility]   4/5/2021  8:30 AM Trudy Guerrero NP [facility] BS AMB       Marilee Marrufo  February 12, 2021

## 2021-02-12 NOTE — PROGRESS NOTES
[address] [facility], [address] [place] [address]  [phone]      Oncology Progress note      Patient: Jose Hernández MRN: [id number]  SSN: [id number]    YOB: 1975  Age: 50 y.o. Sex: female        Diagnosis:     1. 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 Neoadjuvant chemotherapy   Docetaxel, Carboplatin, Trastuzumab + Pertuzumab - s/p 6 cycles  2. Left breast lumpectomy 07/09/2020  2. Adjuvant chemotherapy   Trastuzumab, Pertuzumab - Cycle 10 of 11    Subjective:      Jose Hernández is a 50 y.o. female who I am seeing in follow up for a diagnosis of left sided invasive breast carcinoma. She felt a lump in her left breast. Dr. Gertrude Silva obtained a diagnostic mammogram. The mammogram showed an abnormality in the upper quadrant of the left breast. A biopsy of the lesion revealed a ER 0% UT 0% and Her 2 3+ Gr 3 IDC of the left breast.     Ms. Robby Ovalles completed neoadjuvant chemotherapy and underwent left breast lumpectomy on 7/9/2020. She had a pCR. She completed adjuvant radiation in September 2020. She is receiving adjuvant Herceptin/Perjeta. She continues to have intermittent diarrhea. She had her mammogram this morning and it showed abnormal calcifications (BiRads 3). I communicated with Dr. Rosario Laboy the results and she will follow up with the patient on Tuesday. Review of Systems:    Constitutional: negative  Eyes: negative  Ears, Nose, Mouth, Throat, and Face: negative  Respiratory: negative  Cardiovascular: negative  Gastrointestinal: negative  Genitourinary:negative  Integument/Breast: negative  Hematologic/Lymphatic: negative  Musculoskeletal: left arm tightness when trying to raise her arm  Neurological: negative    ROS was pulled in from a previous visit.  No changes were made d/t symptoms remain the same. Past Medical History:   Diagnosis Date    Anxiety and depression     Cancer ([facility] [address] 01/2020    left breast ca er/pr (-) her 2 (+)     Past Surgical History:   Procedure Laterality Date    HX BREAST BIOPSY Left 07/24/2018    Left sebaceous cyst surgically removed    HX BREAST BIOPSY Right     HX BREAST LUMPECTOMY Left 7/9/2020    LEFT BREAST WIRE-LOCALIZED LUMPECTOMY AND LEFT BREAST SENTINEL NODE BIOPSY performed by [facility]    HX CYST INCISION AND DRAINAGE Left 20+ yrs ago    HX HEENT      tonsillectomy    HX VASCULAR ACCESS Right 02/25/2020    right chest area    IR INSERT TUNL CVC W PORT OVER 5 YEARS  2/25/2020      Family History   Problem Relation Age of Onset    Hypertension Mother     Kidney Disease Mother     Thyroid Disease Mother      Social History     Tobacco Use    Smoking status: Never Smoker    Smokeless tobacco: Never Used   Substance Use Topics    Alcohol use: No      Prior to Admission medications    Medication Sig Start Date End Date Taking? Authorizing Provider   diphenoxylate-atropine (LOMOTIL) 2.5-0.025 mg per tablet TAKE 1 TABLET BY MOUTH 4 TIMES A DAY AS NEEDED FOR DIARRHEA *MAX 4 TABS/DAY* 8/17/20  Yes Alexa Marquez NP   naloxone (NARCAN) 4 mg/actuation nasal spray Use 1 spray intranasally, then discard. Repeat with new spray every 2 min as needed for opioid overdose symptoms, alternating nostrils. 7/9/20  Yes Teresa Dutton MD   ethinyl estradiol-etonogestrel (NuvaRing) 0.12-0.015 mg/24 hr vaginal ring by Intravaginal route. Yes Provider, Historical   ondansetron (ZOFRAN ODT) 4 mg disintegrating tablet Take 1 Tab by mouth every eight (8) hours as needed for Nausea or Vomiting. 4/15/20  Yes Radha Marquez NP   prochlorperazine (COMPAZINE) 10 mg tablet Take 0.5 Tabs by mouth every six (6) hours as needed for Nausea for up to 30 doses.  2/11/20  Yes Anne Slaughter NP   lidocaine-prilocaine (EMLA) topical cream Apply  to affected area as needed for Pain. 2/11/20  Yes Mikayla Marquez NP   dexAMETHasone (DECADRON) 4 mg tablet Take 8 mg by mouth two (2) times daily (with meals). The day of chemotherapy and the day after chemotherapy. 2/11/20  Yes Mikayla Marquez NP   VIIBRYD 40 mg tab tablet TAKE 1 TABLET BY MOUTH EVERY DAY 6/19/18  Yes Provider, Historical        No Known Allergies      Objective:     Visit Vitals  /71   Pulse 66   Temp 97.8 °F (36.6 °C)   Resp 16   Ht 5' 6\" (1.676 m)   Wt 169 lb (76.7 kg)   SpO2 98%   BMI 27.28 kg/m²     Pain Scale: 0 - No pain/10      Physical Exam:    GENERAL: alert, cooperative, no distress, appears stated age  EYE: conjunctivae/corneas clear. LYMPHATIC: Cervical, supraclavicular, and axillary nodes normal.   THROAT & NECK: normal and no erythema or exudates noted. LUNG: clear to auscultation bilaterally  HEART: regular rate and rhythm  ABDOMEN: soft, non-tender. Bowel sounds normal.  EXTREMITIES:  No edema  SKIN: Normal.  NEUROLOGIC: AOx3. Gait normal.      Physical exam and ROS has been modified from a prior visit to make it relevant and current      Assessment:     1. Left breast carcinoma: Dx: 1/30/2020  T2 N1 M0 (Stage IIA) Invasive ductal carcinoma, Tumor size 2 cm, grade 3, ER 0%, IA 0%, Her 2 3+       ypT0 ypN0 - pCR    ECOG PS 0  Intent of Treatment - curative  Prognosis: excellent     LVEF (12/29/20) - 55-60%    Completed neoadjuvant chemotherapy   Docetaxel, Carboplatin, Trastuzumab + Pertuzumab - s/p 6 cycles    S/p left breast lumpectomy and left SLNB on 7/9/2020  Achieved pCR    Adjuvant radiation on clinical trial - completed 9/2020 with Dr. Osiris Patterson 2/12/2021 - BI-RADS Assessment Category 3: Probably benign finding. Short-interval  follow-up suggested. Left breast calcifications at the lumpectomy bed, strongly favored to be dystrophic calcifications.  This is the patient's first postlumpectomy mammogram    Receiving adjuvant chemotherapy   Trastuzumab, Pertuzumab - Cycle 10 of 11    Tolerating treatment very well  Denies any side effects. A detailed system by system evaluation of side effect was performed to assess adverse events. Blood counts are acceptable. Results reviewed with the patient      Plan:     · Proceed with adjuvant herceptin/perjeta  · Follow up with Dr. Brianda Calhoun regarding mammogram results  · She will need a follow up 2d echo once she completes all of her treatment. · Follow-up in 3 months      I performed a history and physical examination of the patient and discussed his management with the NPP. I reviewed the NPP note and agree with the documented findings and plan of care. The patient was seen in conjunction with Ms. Marquez. Signed by: Rhonda Herrera MD                     February 13, 2021        CC. Mendez Benavides MD  CC.  Ivy Kulkarni MD

## 2021-02-12 NOTE — TELEPHONE ENCOUNTER
Called and spoke with patient. Reviewed recent mammogram.  Discussed appearance of normal vs abnormal calcifications, risk of recurrence and follow-up options - 6 month imaging follow-up or biopsy. She is a bit unsure, but will plan on 6 month imaging follow-up. She will contact the office if she aidan like to have a biopsy instead.

## 2021-03-01 DIAGNOSIS — C50.912 MALIGNANT NEOPLASM OF LEFT BREAST IN FEMALE, ESTROGEN RECEPTOR NEGATIVE, UNSPECIFIED SITE OF BREAST (HCC): Primary | ICD-10-CM

## 2021-03-01 DIAGNOSIS — Z17.1 MALIGNANT NEOPLASM OF LEFT BREAST IN FEMALE, ESTROGEN RECEPTOR NEGATIVE, UNSPECIFIED SITE OF BREAST (HCC): Primary | ICD-10-CM

## 2021-03-01 DIAGNOSIS — Z79.899 ENCOUNTER FOR MONITORING CARDIOTOXIC DRUG THERAPY: ICD-10-CM

## 2021-03-01 DIAGNOSIS — Z51.81 ENCOUNTER FOR MONITORING CARDIOTOXIC DRUG THERAPY: ICD-10-CM

## 2021-03-05 ENCOUNTER — HOSPITAL ENCOUNTER (OUTPATIENT)
Dept: INFUSION THERAPY | Age: 46
End: 2021-03-05

## 2021-03-10 ENCOUNTER — HOSPITAL ENCOUNTER (OUTPATIENT)
Dept: PHYSICAL THERAPY | Age: 46
Discharge: HOME | End: 2021-03-10
Payer: COMMERCIAL

## 2021-03-10 PROCEDURE — 97162 PT EVAL MOD COMPLEX 30 MIN: CPT | Performed by: PHYSICAL THERAPIST

## 2021-03-10 PROCEDURE — 97110 THERAPEUTIC EXERCISES: CPT | Performed by: PHYSICAL THERAPIST

## 2021-03-10 PROCEDURE — 97530 THERAPEUTIC ACTIVITIES: CPT | Performed by: PHYSICAL THERAPIST

## 2021-03-10 NOTE — PROGRESS NOTES
PT ONCOLOGY EVAL/DAILY NOTE    Patient Name: Bing Richards  Date:3/10/2021  : 1975  [x]  Patient  Verified  Payor: Marbin Chris / Plan: Steven Cea / Product Type: HMO /    In time:4:05  Out time:5:05  Total Treatment Time (min): 60  Total Timed Codes (min): 45     Visit #:  of 20    Treatment Area: Pain in left shoulder [M25.512]    SUBJECTIVE    Current symptoms/Complaints: Pt arrives to PT frustrated about lack of screener at entrance. Pt reports that she is \"overwhelmed\" at work. Pt saw a chiropractor for radiating pain into forearm and hand this past January which she reports helped but continues to have pain when using arm, \"tugging, pulling on outside of my arm\" that has been since her lumpectomy and SLNB surgery in 2020. Pt reports difficulty sleeping on L side, reaching, holding, carrying objects, no previous injuries to L shoulder, does report CIPN but denies swelling in arm or hand.     [] Constant []Intermittent    []Improving  [x]Staying the Same  []Getting worse    Subjective functional status:     Present Symptoms/History:   Past Medical History:   Diagnosis Date    Anxiety and depression      Cancer ([facility] [address] 2020     left breast ca er/pr (-) her 2 (+)            Past Surgical History:   Procedure Laterality Date    HX BREAST BIOPSY Left 2018     Left sebaceous cyst surgically removed    HX BREAST BIOPSY Right      HX BREAST LUMPECTOMY Left 2020     LEFT BREAST WIRE-LOCALIZED LUMPECTOMY AND LEFT BREAST SENTINEL NODE BIOPSY performed by Brigitte Kasper MD at [facility] AMBULATORY OR    HX CYST INCISION AND DRAINAGE Left 20+ yrs ago    HX HEENT         tonsillectomy    HX VASCULAR ACCESS Right 2020     right chest area    IR INSERT TUNL CVC W PORT OVER 5 YEARS             From Dr Sophy Carl office note 21  Ms. Hebert Bobby completed neoadjuvant chemotherapy and underwent left breast lumpectomy on 2020. She had a pCR.  She completed adjuvant radiation in September 2020. She is receiving adjuvant Herceptin/Perjeta. She continues to have intermittent diarrhea. She had her mammogram this morning and it showed abnormal calcifications (BiRads 3). I communicated with Dr. Bret Guillen the results and she will follow up with the patient on Tuesday. Referring Provider: Boyd Ryder MD   Medical Oncologist: Dr Murphy  Oncologist: Dr Crescencio Agrawal (finished in September 2020, total of 20 treatments)   Primary Care Physician: Katty Sprague MD  Next Appointment: April 5 2021 with Erich Sebastian  Diagnosis: L shoulder pain and stiffness  Precautions/Indications: infection and lymphedema precautions, active chemotherapy  History of Present Illness:  Diagnosis:      1. Left breast carcinoma: Dx: 1/31/2020  T2 N1 M0 (Stage IIA) Invasive ductal carcinoma, Tumor size 2 cm, grade 3, ER 0%, SC 0%, Her 2 3+      ypT0 ypN0 - pCR     Treatment:      1. Neoadjuvant chemotherapy              Docetaxel, Carboplatin, Trastuzumab + Pertuzumab - s/p 6 cycles  2. Left breast lumpectomy 07/09/2020  2. Adjuvant chemotherapy              Trastuzumab, Pertuzumab -             Pain Intensity:6 on a scale of 0-10  · Pain Aggravating Factors: \"using L arm for anything\" ADL's, reaching, dressing  · Pain Relieving Factors: \"not using arm\"      Has your activity changed since diagnosis?    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 Left:    No         Right: Yes         Upper Extremity AROM:                                                                                 R                                 L  Shoulder Flexion   170                               92 with end range p! Shoulder Abduction  165                               71 with end range p! Shoulder Extension  15                               5     with end range p! Shoulder ER   41                               10   with end range p! Shoulder IR                        31                               11   with end range p!                                                                                                                             UPPER QUARTER                           MUSCLE STRENGTH  KEY                                                              R            L  0 - No Contraction        C1, C2 Neck Flex 4                                       1 - Trace                       C3 Side Flex          4           4                                                 2 - Poor                         C4 Sh Elev           4           4                                             3 - Fair                          C5 Deltoid/Biceps  4   2                                   4 - Good                       C6 Wrist Ext           4   4                                            5 - Normal                     C7 Triceps             4   2                                                                                  C8 Thumb Ext        4   4 T1 Hand Inst          4   4                                             MMT: See above      R  L  Shoulder flexion  4    NT d/t p! Shoulder ER  3+    NT d/t p! Shoulder IR  3    NT d/t p! Neurological: Sensations: Light touch: Intact to LT throughout L upper quadrant except diminished in L axilla                     Palpation: TTP L UT/LS, rhomboids, subscap, pec minor/major, susanne-incisional  Posture: forward head, L IR at g-h jt, protracted scapula  Breath pattern assessment  Diaphragm: not primary; able to initiate  Anterior chest: primary   Accessory respiratory muscle use: CAMI quach         30 min Therapeutic Exercise:  [x] See flow sheet :   Rationale: increase ROM and increase strength to improve the patients ability to perform ADL's required for return to work and/or community      15 min Therapeutic Activity:  []  See flow sheet :   Rationale: Pt education on infection and lymphedema risk reduction, importance of prescribed HEP compliance              With   [x] TE   [x] TA   [] neuro   [] other: Patient Education: [] Review HEP    [] Progressed/Changed HEP based on:   [] positioning   [] body mechanics   [] transfers   [] heat/ice application    [] other:      Other Objective/Functional Measures:       Pain Level (0-10 scale) post treatment: 5-6    ASSESSMENT/Changes in Function: Pt is a 50 y.o female s/p L breast cancer and treatments. Pt's chief c/o is difficulty and painful use of L UE for all ADL's involving reaching at or above shoulder level. Pt presents with decreased L upper quadrant motion and strength, and poor posture and breath pattern.  Patient will benefit from skilled PT services to address functional mobility deficits, address ROM deficits, address strength deficits, analyze and address soft tissue restrictions, analyze and cue movement patterns, analyze and modify body mechanics/ergonomics, assess and modify postural abnormalities and instruct in home and community integration to address rehab goals per plan of care          Goals:  See Plan of Care    PLAN  []  Upgrade activities as tolerated     [x]  Continue plan of care  []  Update interventions per flow sheet       []  Discharge due to:_  []  Other:_      Jose L Crum, PT 3/10/2021  4:10 PM

## 2021-03-12 ENCOUNTER — HOSPITAL ENCOUNTER (OUTPATIENT)
Dept: INFUSION THERAPY | Age: 46
Discharge: HOME | End: 2021-03-12
Payer: COMMERCIAL

## 2021-03-12 VITALS
HEIGHT: 66 IN | SYSTOLIC BLOOD PRESSURE: 122 MMHG | HEART RATE: 88 BPM | WEIGHT: 171.4 LBS | RESPIRATION RATE: 16 BRPM | BODY MASS INDEX: 27.55 KG/M2 | DIASTOLIC BLOOD PRESSURE: 76 MMHG | TEMPERATURE: 97.2 F

## 2021-03-12 DIAGNOSIS — Z17.1 MALIGNANT NEOPLASM OF LEFT BREAST IN FEMALE, ESTROGEN RECEPTOR NEGATIVE, UNSPECIFIED SITE OF BREAST (HCC): Primary | ICD-10-CM

## 2021-03-12 DIAGNOSIS — C50.912 MALIGNANT NEOPLASM OF LEFT BREAST IN FEMALE, ESTROGEN RECEPTOR NEGATIVE, UNSPECIFIED SITE OF BREAST (HCC): Primary | ICD-10-CM

## 2021-03-12 PROCEDURE — 96417 CHEMO IV INFUS EACH ADDL SEQ: CPT

## 2021-03-12 PROCEDURE — 74011000250 HC RX REV CODE- 250: Performed by: INTERNAL MEDICINE

## 2021-03-12 PROCEDURE — 77030012965 HC NDL HUBR BBMI -A

## 2021-03-12 PROCEDURE — 96413 CHEMO IV INFUSION 1 HR: CPT

## 2021-03-12 PROCEDURE — 74011250636 HC RX REV CODE- 250/636: Performed by: INTERNAL MEDICINE

## 2021-03-12 RX ORDER — SODIUM CHLORIDE 9 MG/ML
25 INJECTION, SOLUTION INTRAVENOUS CONTINUOUS
Status: DISCONTINUED | OUTPATIENT
Start: 2021-03-12 | End: 2021-03-13 | Stop reason: HOSPADM

## 2021-03-12 RX ORDER — HEPARIN 100 UNIT/ML
300-500 SYRINGE INTRAVENOUS AS NEEDED
Status: DISCONTINUED | OUTPATIENT
Start: 2021-03-12 | End: 2021-03-13 | Stop reason: HOSPADM

## 2021-03-12 RX ORDER — SODIUM CHLORIDE 9 MG/ML
10 INJECTION INTRAMUSCULAR; INTRAVENOUS; SUBCUTANEOUS AS NEEDED
Status: DISCONTINUED | OUTPATIENT
Start: 2021-03-12 | End: 2021-03-13 | Stop reason: HOSPADM

## 2021-03-12 RX ORDER — SODIUM CHLORIDE 0.9 % (FLUSH) 0.9 %
10 SYRINGE (ML) INJECTION AS NEEDED
Status: DISCONTINUED | OUTPATIENT
Start: 2021-03-12 | End: 2021-03-13 | Stop reason: HOSPADM

## 2021-03-12 RX ADMIN — Medication 10 ML: at 17:05

## 2021-03-12 RX ADMIN — Medication 500 UNITS: at 17:05

## 2021-03-12 RX ADMIN — SODIUM CHLORIDE 25 ML/HR: 900 INJECTION, SOLUTION INTRAVENOUS at 15:55

## 2021-03-12 RX ADMIN — SODIUM CHLORIDE 10 ML: 9 INJECTION, SOLUTION INTRAMUSCULAR; INTRAVENOUS; SUBCUTANEOUS at 15:15

## 2021-03-12 RX ADMIN — SODIUM CHLORIDE 460 MG: 9 INJECTION, SOLUTION INTRAVENOUS at 15:55

## 2021-03-12 RX ADMIN — PERTUZUMAB 420 MG: 30 INJECTION, SOLUTION, CONCENTRATE INTRAVENOUS at 16:30

## 2021-03-12 NOTE — PROGRESS NOTES
[facility]fusion [facility] - Chemotherapy Progress Note    4090 - Pt admit to [facility] for Traz/Perj  in stable condition. Assessment completed. Patient denied having any symptoms of COVID-19, i.e. SOB, coughing, fever, or generally not feeling well. Also denies having been exposed to COVID-19 recently or having had any recent contact with family/friend that has a pending COVID test.     R chest port accessed with positive blood return. Line flushed, clamped, Curos Cap applied to end clave. Chemotherapy Flowsheet 3/12/2021   Cycle C17   Date 3/12/2021   Drug / Regimen Traz/Perj   Pre Meds -   Notes 12/29/20, 55-60%        Patient Vitals for the past 12 hrs:   Temp Pulse Resp BP   03/12/21 1701 -- 88 16 122/76   03/12/21 1508 97.2 °F (36.2 °C) (!) 59 16 128/78        No results found for this or any previous visit (from the past 12 hour(s)).      Medications:  Medications Administered     0.9% sodium chloride infusion     Admin Date  03/12/2021 Action  New Bag Dose  25 mL/hr Rate  25 mL/hr Route  IntraVENous Administered By  PAGE Price          0.9% sodium chloride injection 10 mL     Admin Date  03/12/2021 Action  Given Dose  10 mL Route  IntraVENous Administered By  PAGE Price          heparin (porcine) pf 300-500 Units     Admin Date  03/12/2021 Action  Given Dose  500 Units Route  InterCATHeter Administered By  PAGE Price          pertuzumab (PERJETA) 420 mg in 0.9% sodium chloride 250 mL, overfill volume 25 mL IVPB     Admin Date  03/12/2021 Action  New Bag Dose  420 mg Rate  578 mL/hr Route  IntraVENous Administered By  PAGE Price          saline peripheral flush soln 10 mL     Admin Date  03/12/2021 Action  Given Dose  10 mL Route  InterCATHeter Administered By  PAGE Price          trastuzumab-qyyp (TRAZIMERA) 460 mg in 0.9% sodium chloride 250 mL, overfill volume 25 mL IVPB     Admin Date  03/12/2021 Action  Given Dose  460 mg Rate  593.8 mL/hr Route  IntraVENous Administered By  Marisol Mccord, RN                 Pt tolerated treatment well. Port maintained positive blood return throughout treatment, flushed with positive blood return at conclusion, and de-accessed. 1705 - D/c home in no distress.  Pt aware of next [facility] appointment scheduled for:    Future Appointments   Date Time Provider Viri Janet   3/17/2021  4:00 PM Johnny Hernandez, PT [facility] RE   3/24/2021  4:00 PM Chet Nickerson, PT [facility] RE   3/29/2021  1:00 PM [facility] ROOM [facility] [address]   3/31/2021  4:00 PM Johnny Hernandez, PT [facility] RE   4/5/2021  8:30 AM Ling Mahoney NP C BS AMB   4/7/2021  4:00 PM Johnny Hernandez, PT [facility] RE   5/12/2021 10:15 AM Martha Conley MD [facility]JOY[facility]ELY[facility] BS AMB   8/24/2021  9:45 [address]

## 2021-03-14 NOTE — PROGRESS NOTES
Physical Therapy at [facility],   a part of [address]  [address] [facility], [address] [facility]   Phone: [phone]  Fax: [phone]    Plan of Care/Statement of Necessity for Physical Therapy Services  2-15    Patient name: Polo Wasserman  : 1975  Provider#: [id number]  Referral source: John Lynne MD      Medical/Treatment Diagnosis: Pain in left shoulder [M25.512]     Prior Hospitalization: see medical history     Comorbidities: L breast cancer  Prior Level of Function: No difficulty or pain using L UE for ADL's at or above shoulder level  Medications: Verified on Patient Summary List    Start of Care: 3/10/2021      Onset Date: 2020       The Plan of Care and following information is based on the information from the initial evaluation. Assessment/ key information: Pt is a 50 y.o female s/p L breast cancer and treatments. Pt's chief c/o is difficulty and painful use of L UE for all ADL's involving reaching at or above shoulder level. Pt presents with decreased L upper quadrant motion and strength, and poor posture and breath pattern. Patient will benefit from skilled PT services to address functional mobility deficits, address ROM deficits, address strength deficits, analyze and address soft tissue restrictions, analyze and cue movement patterns, analyze and modify body mechanics/ergonomics, assess and modify postural abnormalities and instruct in home and community integration to address rehab goals per plan of care              Evaluation Complexity History MEDIUM  Complexity : 1-2 comorbidities / personal factors will impact the outcome/ POC ; Examination MEDIUM Complexity : 3 Standardized tests and measures addressing body structure, function, activity limitation and / or participation in recreation  ;Presentation MEDIUM Complexity : Evolving with changing characteristics  ; Clinical Decision Making MEDIUM Complexity : FOTO score of 26-74  Overall Complexity Rating: MEDIUM    Problem List: pain affecting function, decrease ROM, decrease strength and decrease ADL/ functional abilitiies   Treatment Plan may include any combination of the following: Therapeutic exercise, Therapeutic activities, Neuromuscular re-education, Physical agent/modality, Manual therapy, Patient education and Functional mobility training  Patient / Family readiness to learn indicated by: asking questions, trying to perform skills and interest  Persons(s) to be included in education: patient (P)  Barriers to Learning/Limitations: None  Patient Goal (s): stop pain when I use my arm  Patient Self Reported Health Status: fair  Rehabilitation Potential: good    Short Term Goals: To be accomplished in 2 treatments:  1) Pt will verbalize understanding of infection and lymphedema risk reduction practices. 2) Pt will demonstrate [place] in initial HEP for breathing and postural exercises  3) Pt will verbalize knowledge of signs and symptoms to report to physician.     Long Term Goals: To be accomplished in 20 treatments:  1) Pt will be have decreased pain by 4-5  levels on NPS when using L UE for all ADL's involving reaching, lifting, pushing, and pulling. 2) Pt will be Independent with HEP for pain management, utilizing correct breath pattern, strengthening, and motion exercises. 3) Pt will utilize correct breath and movement patterns during all ADL's involving L UE movements at or above shoulder level  4) Pt will have increased motion in L upper quadrant by 20-30 degrees with decreased pain and improved strength by 1-2 muscle grades in order to use L UE for all ADL's involving reaching, lifting, pushing, and pulling. Frequency / Duration: Patient to be seen 1-2 times per week for 20 treatments.     Patient/ Caregiver education and instruction: activity modification, exercises and other lymphedema and infection risk reduction    [x]  Plan of care has been reviewed with TODD Logan Sheri Hernandez, PT 3/10/2021  ________________________________________________________________________    I certify that the above Therapy Services are being furnished while the patient is under my care. I agree with the treatment plan and certify that this therapy is necessary.     Physician's Signature:____________________  Date:____________Time: _________      Yasmeen Dao MD

## 2021-03-17 ENCOUNTER — APPOINTMENT (OUTPATIENT)
Dept: PHYSICAL THERAPY | Age: 46
End: 2021-03-17
Payer: COMMERCIAL

## 2021-03-22 ENCOUNTER — HOSPITAL ENCOUNTER (OUTPATIENT)
Dept: PHYSICAL THERAPY | Age: 46
Discharge: HOME | End: 2021-03-22
Payer: COMMERCIAL

## 2021-03-22 PROCEDURE — 97140 MANUAL THERAPY 1/> REGIONS: CPT | Performed by: PHYSICAL THERAPY ASSISTANT

## 2021-03-22 PROCEDURE — 97110 THERAPEUTIC EXERCISES: CPT | Performed by: PHYSICAL THERAPY ASSISTANT

## 2021-03-22 NOTE — PROGRESS NOTES
PT DAILY TREATMENT NOTE - [facility] 2-15    Patient Name: Porfirio Alba  Date:3/22/2021  : 1975  [x]  Patient  Verified  Payor: Stephon Overall / Plan: Jean-Baptiste Setter / Product Type: HMO /    In time: 4:30 PM Out time: 5:15 PM  Total Treatment Time (min):45   Total Timed Codes (min): 45  1:1 Treatment Time ( [address] only): 39   Visit #:  2    Treatment Area: Pain in left shoulder [M25.512]    SUBJECTIVE  Pain Level (0-10 scale): 0/10 at rest, 5-6/10 with movement  Any medication changes, allergies to medications, adverse drug reactions, diagnosis change, or new procedure performed?: [x] No    [] Yes (see summary sheet for update)  Subjective functional status/changes:   [] No changes reported  Patient reports she traveled out of town to [place] this past weekend to visit her oldest son. States fair compliance with HEP while away. OBJECTIVE    Modality rationale: decrease inflammation and decrease pain to improve the patients ability to reach, lift, perform ADL's without pain/limitation   Min Type Additional Details       [] Estim: []Att   []Unatt    []TENS instruct                  []IFC  []Premod   []NMES                     []Other:  []w/US   []w/ice   []w/heat  Position:  Location:       []  Traction: [] Cervical       []Lumbar                       [] Prone          []Supine                       []Intermittent   []Continuous Lbs:  [] before manual  [] after manual  []w/heat    []  Ultrasound: []Continuous   [] Pulsed                       at: []1MHz   []3MHz Location:  W/cm2:    [] Paraffin         Location:   []w/heat   decl.  [x]  Ice     []  Heat  []  Ice massage Position:  Location:    []  Laser  []  Other: Position:  Location:      []  Vasopneumatic Device Pressure:       [] lo [] med [] hi   Temperature:      [x] Skin assessment post-treatment:  [x]intact []redness- no adverse reaction    []redness - adverse reaction:     30 min Therapeutic Exercise:  [x] See flow sheet : reviewed HEP, added per flow sheet   Rationale: increase ROM, increase strength and improve coordination to improve the patients ability to reach, lift, perform ADL's without pain/limitation    15 min Manual Therapy: gentle oscillations to reduce muscle guarding and pain, PROM L shoulder all motions to tolerance   Rationale: decrease pain, increase ROM, increase tissue extensibility and decrease trigger points to improve the patients ability to reach, lift, perform ADL's without pain/limitation            With   [x] TE   [] TA   [] Neuro   [] SC   [] other: Patient Education: [x] Review HEP    [] Progressed/Changed HEP based on:   [] positioning   [] body mechanics   [] transfers   [x] heat/ice application    [x] other: reviewed neutral cervical posture in sitting/standing      Other Objective/Functional Measures: NT     Pain Level (0-10 scale) post treatment: \"It's not too bad. \"    ASSESSMENT/Changes in Function:   Patient tolerated ROM and scapular strengthening exercises well today. Encouraged continued compliance of HEP and ice application if she is sore after today. Improved PROM noted today. Patient will continue to benefit from skilled PT services to modify and progress therapeutic interventions, address functional mobility deficits, address ROM deficits, address strength deficits, analyze and address soft tissue restrictions, analyze and cue movement patterns, analyze and modify body mechanics/ergonomics and instruct in home and community integration to attain remaining goals.      []  See Plan of Care  []  See progress note/recertification  []  See Discharge Summary         Progress towards goals / Updated goals:  NT    PLAN  [x]  Upgrade activities as tolerated     [x]  Continue plan of care  []  Update interventions per flow sheet       []  Discharge due to:_  []  Other:_      Kiersten Trujillo PTA 3/22/2021

## 2021-03-24 ENCOUNTER — HOSPITAL ENCOUNTER (OUTPATIENT)
Dept: PHYSICAL THERAPY | Age: 46
Discharge: HOME | End: 2021-03-24
Payer: COMMERCIAL

## 2021-03-24 PROCEDURE — 97110 THERAPEUTIC EXERCISES: CPT | Performed by: PHYSICAL THERAPIST

## 2021-03-24 PROCEDURE — 97140 MANUAL THERAPY 1/> REGIONS: CPT | Performed by: PHYSICAL THERAPIST

## 2021-03-24 NOTE — PROGRESS NOTES
PT DAILY TREATMENT NOTE - [facility] 2-15    Patient Name: Bob Ordonez  Date:3/24/2021  : 1975  [x]  Patient  Verified  Payor: Tawnya Mendoza / Plan: Rita Correa / Product Type: HMO /    In time: 4:00 PM Out time: 5:00 PM  Total Treatment Time (min):60   Total Timed Codes (min): 60  1:1 Treatment Time ( [address] only): 60   Visit #:  3    Treatment Area: Pain in left shoulder [M25.512]    SUBJECTIVE  Pain Level (0-10 scale): 3/10 at rest, 7/10 with movement  Any medication changes, allergies to medications, adverse drug reactions, diagnosis change, or new procedure performed?: [x] No    [] Yes (see summary sheet for update)  Subjective functional status/changes:   [] No changes reported  Pt states that her shoulder is \"very achy today even at rest\"     OBJECTIVE    Modality rationale: decrease inflammation and decrease pain to improve the patients ability to reach, lift, perform ADL's without pain/limitation   Min Type Additional Details       [] Estim: []Att   []Unatt    []TENS instruct                  []IFC  []Premod   []NMES                     []Other:  []w/US   []w/ice   []w/heat  Position:  Location:       []  Traction: [] Cervical       []Lumbar                       [] Prone          []Supine                       []Intermittent   []Continuous Lbs:  [] before manual  [] after manual  []w/heat    []  Ultrasound: []Continuous   [] Pulsed                       at: []1MHz   []3MHz Location:  W/cm2:    [] Paraffin         Location:   []w/heat   10 []  Ice     [x]  Heat  []  Ice massage Position: supine  Location: neck, L shoulder    []  Laser  []  Other: Position:  Location:      []  Vasopneumatic Device Pressure:       [] lo [] med [] hi   Temperature:      [x] Skin assessment post-treatment:  [x]intact []redness- no adverse reaction    []redness - adverse reaction:     15 min Therapeutic Exercise:  [x] See flow sheet : reviewed HEP, added per flow sheet   Rationale: increase ROM, increase strength and improve coordination to improve the patients ability to reach, lift, perform ADL's without pain/limitation    45 min Manual Therapy: gentle oscillations to reduce muscle guarding and pain, PROM L shoulder all motions to tolerance, STM L pec, subscap, subclavius, post deltoid, B UT/LS, paracervical m, bicep/tricep, L breast pumping inf,med/lat to tolerance   Rationale: decrease pain, increase ROM, increase tissue extensibility and decrease trigger points to improve the patients ability to reach, lift, perform ADL's without pain/limitation            With   [x] TE   [] TA   [] Neuro   [] SC   [] other: Patient Education: [x] Review HEP    [] Progressed/Changed HEP based on:   [] positioning   [] body mechanics   [] transfers   [x] heat/ice application    [] other: reviewed neutral cervical posture in sitting/standing      Other Objective/Functional Measures: NT     Pain Level (0-10 scale) post treatment: 1 at rest  5 with R UE use    ASSESSMENT/Changes in Function:   Patient had poor tolerance to stretches and scapular strengthening exercises today. Pt exhibited increased muscle guarding with PROM but reported decreased discomfort following gentle STM. Patient will continue to benefit from skilled PT services to modify and progress therapeutic interventions, address functional mobility deficits, address ROM deficits, address strength deficits, analyze and address soft tissue restrictions, analyze and cue movement patterns, analyze and modify body mechanics/ergonomics and instruct in home and community integration to attain remaining goals.      []  See Plan of Care  []  See progress note/recertification  []  See Discharge Summary         Progress towards goals / Updated goals:  NT    PLAN  [x]  Upgrade activities as tolerated     [x]  Continue plan of care  []  Update interventions per flow sheet       []  Discharge due to:_  []  Other:_      Princess Wagoner, PT 3/24/2021

## 2021-03-26 ENCOUNTER — APPOINTMENT (OUTPATIENT)
Dept: INFUSION THERAPY | Age: 46
End: 2021-03-26

## 2021-03-29 ENCOUNTER — HOSPITAL ENCOUNTER (OUTPATIENT)
Dept: PHYSICAL THERAPY | Age: 46
Discharge: HOME | End: 2021-03-29
Payer: COMMERCIAL

## 2021-03-29 PROCEDURE — 97110 THERAPEUTIC EXERCISES: CPT | Performed by: PHYSICAL THERAPY ASSISTANT

## 2021-03-29 PROCEDURE — 97140 MANUAL THERAPY 1/> REGIONS: CPT | Performed by: PHYSICAL THERAPY ASSISTANT

## 2021-03-29 NOTE — PROGRESS NOTES
PT DAILY TREATMENT NOTE - [facility] 2-15    Patient Name: Alyssia Purvis  Date:3/29/2021  : 1975  [x]  Patient  Verified  Payor: Madiha Green / Plan: Orlin Reading / Product Type: HMO /    In time: 3:50 PM Out time: 4:50 PM  Total Treatment Time (min):60   Total Timed Codes (min): 50  1:1 Treatment Time ( [address] only): 50   Visit #:  2    Treatment Area: Pain in left shoulder [M25.512]    SUBJECTIVE  Pain Level (0-10 scale): 0/10 at rest, 1-2/10 with movement  Any medication changes, allergies to medications, adverse drug reactions, diagnosis change, or new procedure performed?: [x] No    [] Yes (see summary sheet for update)  Subjective functional status/changes:   [] No changes reported  Pt states she is doing much better today compared to previous treatment session.  At times she feels like there is just \"tight tissue, like a knot in my breast.\" States continued muscle soreness along anterior chest (pectoralis major/minor region)     OBJECTIVE    Modality rationale: decrease inflammation and decrease pain to improve the patients ability to reach, lift, perform ADL's without pain/limitation   Min Type Additional Details       [] Estim: []Att   []Unatt    []TENS instruct                  []IFC  []Premod   []NMES                     []Other:  []w/US   []w/ice   []w/heat  Position:  Location:       []  Traction: [] Cervical       []Lumbar                       [] Prone          []Supine                       []Intermittent   []Continuous Lbs:  [] before manual  [] after manual  []w/heat    []  Ultrasound: []Continuous   [] Pulsed                       at: []1MHz   []3MHz Location:  W/cm2:    [] Paraffin         Location:   []w/heat   10 []  Ice     [x]  Heat  []  Ice massage Position: supine  Location: neck, L shoulder    []  Laser  []  Other: Position:  Location:      []  Vasopneumatic Device Pressure:       [] lo [] med [] hi   Temperature:      [x] Skin assessment post-treatment:  [x]intact []redness- no adverse reaction    []redness - adverse reaction:     10 min Therapeutic Exercise:  [x] See flow sheet : reviewed HEP, added per flow sheet   Rationale: increase ROM, increase strength and improve coordination to improve the patients ability to reach, lift, perform ADL's without pain/limitation    40 min Manual Therapy: gentle oscillations to reduce muscle guarding and pain, PROM L shoulder all motions to tolerance, STM L pec, subscap, subclavius, post deltoid, B UT/LS, paracervical m, bicep/tricep, L breast pumping inf,med/lat to tolerance   Rationale: decrease pain, increase ROM, increase tissue extensibility and decrease trigger points to improve the patients ability to reach, lift, perform ADL's without pain/limitation            With   [x] TE   [] TA   [] Neuro   [] SC   [] other: Patient Education: [x] Review HEP    [] Progressed/Changed HEP based on:   [] positioning   [] body mechanics   [] transfers   [x] heat/ice application    [] other: reviewed neutral cervical posture in sitting/standing      Other Objective/Functional Measures: NT     Pain Level (0-10 scale) post treatment: \"I feel very relaxed\"    ASSESSMENT/Changes in Function:   Better tolerance to PROM today. Pain noted during No monies so held today otherwise able to perform remaining gentle stretches without aggravation of symptoms. Patient will continue to benefit from skilled PT services to modify and progress therapeutic interventions, address functional mobility deficits, address ROM deficits, address strength deficits, analyze and address soft tissue restrictions, analyze and cue movement patterns, analyze and modify body mechanics/ergonomics and instruct in home and community integration to attain remaining goals.      []  See Plan of Care  []  See progress note/recertification  []  See Discharge Summary         Progress towards goals / Updated goals:  NT    PLAN  [x]  Upgrade activities as tolerated     [x]  Continue plan of care  [] Update interventions per flow sheet       []  Discharge due to:_  []  Other:_      Kathy Saini, TODD 3/29/2021

## 2021-03-30 DIAGNOSIS — C50.912 MALIGNANT NEOPLASM OF LEFT BREAST IN FEMALE, ESTROGEN RECEPTOR NEGATIVE, UNSPECIFIED SITE OF BREAST (HCC): Primary | ICD-10-CM

## 2021-03-30 DIAGNOSIS — Z17.1 MALIGNANT NEOPLASM OF LEFT BREAST IN FEMALE, ESTROGEN RECEPTOR NEGATIVE, UNSPECIFIED SITE OF BREAST (HCC): Primary | ICD-10-CM

## 2021-03-31 ENCOUNTER — HOSPITAL ENCOUNTER (OUTPATIENT)
Dept: PHYSICAL THERAPY | Age: 46
Discharge: HOME | End: 2021-03-31
Payer: COMMERCIAL

## 2021-03-31 PROCEDURE — 97110 THERAPEUTIC EXERCISES: CPT | Performed by: PHYSICAL THERAPIST

## 2021-03-31 PROCEDURE — 97140 MANUAL THERAPY 1/> REGIONS: CPT | Performed by: PHYSICAL THERAPIST

## 2021-03-31 NOTE — PROGRESS NOTES
PT DAILY TREATMENT NOTE - [facility] 2-15    Patient Name: Jean Pierre Cover  Date:3/31/2021  : 1975  [x]  Patient  Verified  Payor: Nancy Ramos / Plan: Ju Free / Product Type: HMO /    In time: 4:00 PM Out time: 5:10 PM  Total Treatment Time (min):70   Total Timed Codes (min): 60  1:1 Treatment Time ( [address] only): 60   Visit #:  3    Treatment Area: Pain in left shoulder [M25.512]    SUBJECTIVE  Pain Level (0-10 scale): 0/10 at rest, 1/10 with movement  Any medication changes, allergies to medications, adverse drug reactions, diagnosis change, or new procedure performed?: [x] No    [] Yes (see summary sheet for update)  Subjective functional status/changes:   [] No changes reported  Pt states she continues to feel tight in L chest and neck but that she \"feels like it is getting better, slowly\"    OBJECTIVE    Modality rationale: decrease inflammation and decrease pain to improve the patients ability to reach, lift, perform ADL's without pain/limitation   Min Type Additional Details       [] Estim: []Att   []Unatt    []TENS instruct                  []IFC  []Premod   []NMES                     []Other:  []w/US   []w/ice   []w/heat  Position:  Location:       []  Traction: [] Cervical       []Lumbar                       [] Prone          []Supine                       []Intermittent   []Continuous Lbs:  [] before manual  [] after manual  []w/heat    []  Ultrasound: []Continuous   [] Pulsed                       at: []1MHz   []3MHz Location:  W/cm2:    [] Paraffin         Location:   []w/heat   10 []  Ice     [x]  Heat  []  Ice massage Position: supine  Location: neck, L shoulder    []  Laser  []  Other: Position:  Location:      []  Vasopneumatic Device Pressure:       [] lo [] med [] hi   Temperature:      [x] Skin assessment post-treatment:  [x]intact []redness- no adverse reaction    []redness - adverse reaction:     15 min Therapeutic Exercise:  [x] See flow sheet : reviewed HEP, added per flow sheet Rationale: increase ROM, increase strength and improve coordination to improve the patients ability to reach, lift, perform ADL's without pain/limitation    45 min Manual Therapy: gentle oscillations to reduce muscle guarding and pain, PROM L shoulder all motions to tolerance, STM L pec, subscap, subclavius, post deltoid, B UT/LS, paracervical m, bicep/tricep, L breast pumping inf,med/lat to tolerance   Rationale: decrease pain, increase ROM, increase tissue extensibility and decrease trigger points to improve the patients ability to reach, lift, perform ADL's without pain/limitation            With   [x] TE   [] TA   [] Neuro   [] SC   [] other: Patient Education: [x] Review HEP    [] Progressed/Changed HEP based on:   [] positioning   [] body mechanics   [] transfers   [x] heat/ice application    [] other: reviewed neutral cervical posture in sitting/standing      Other Objective/Functional Measures: AAROM L shoulder flex= 147              Abd= 135              Pain Level (0-10 scale) post treatment: \"I feel looser\"    ASSESSMENT/Changes in Function:   Pt tolerated manual therapy with decreased muscle guarding. Pt's AAROM has improved with decreased pain at her end range. Patient will continue to benefit from skilled PT services to modify and progress therapeutic interventions, address functional mobility deficits, address ROM deficits, address strength deficits, analyze and address soft tissue restrictions, analyze and cue movement patterns, analyze and modify body mechanics/ergonomics and instruct in home and community integration to attain remaining goals. []  See Plan of Care  []  See progress note/recertification  []  See Discharge Summary         Progress towards goals / Updated goals:  Pt making steady gains toward rehab goals with increased L shoulder elevation before pt reports onset of p!     PLAN  [x]  Upgrade activities as tolerated     [x]  Continue plan of care  []  Update interventions per flow sheet       []  Discharge due to:_  []  Other:_      Neisha León, PT 3/31/2021

## 2021-04-02 ENCOUNTER — TRANSCRIBE ORDER (OUTPATIENT)
Dept: REGISTRATION | Age: 46
End: 2021-04-02

## 2021-04-02 ENCOUNTER — HOSPITAL ENCOUNTER (OUTPATIENT)
Dept: PREADMISSION TESTING | Age: 46
Discharge: HOME | End: 2021-04-02
Payer: COMMERCIAL

## 2021-04-02 DIAGNOSIS — Z01.812 PRE-PROCEDURE LAB EXAM: Primary | ICD-10-CM

## 2021-04-02 DIAGNOSIS — Z01.812 PRE-PROCEDURE LAB EXAM: ICD-10-CM

## 2021-04-02 PROCEDURE — U0003 INFECTIOUS AGENT DETECTION BY NUCLEIC ACID (DNA OR RNA); SEVERE ACUTE RESPIRATORY SYNDROME CORONAVIRUS 2 (SARS-COV-2) (CORONAVIRUS DISEASE [COVID-19]), AMPLIFIED PROBE TECHNIQUE, MAKING USE OF HIGH THROUGHPUT TECHNOLOGIES AS DESCRIBED BY CMS-2020-01-R: HCPCS

## 2021-04-03 LAB — SARS-COV-2, COV2NT: NOT DETECTED

## 2021-04-05 ENCOUNTER — OFFICE VISIT (OUTPATIENT)
Dept: SURGERY | Age: 46
End: 2021-04-05
Payer: COMMERCIAL

## 2021-04-05 ENCOUNTER — HOSPITAL ENCOUNTER (OUTPATIENT)
Dept: PHYSICAL THERAPY | Age: 46
Discharge: HOME | End: 2021-04-05
Payer: COMMERCIAL

## 2021-04-05 ENCOUNTER — ANESTHESIA EVENT (OUTPATIENT)
Dept: MEDSURG UNIT | Age: 46
End: 2021-04-05
Payer: COMMERCIAL

## 2021-04-05 DIAGNOSIS — Z92.3 S/P RADIATION THERAPY: ICD-10-CM

## 2021-04-05 DIAGNOSIS — Z85.3 HISTORY OF BREAST CANCER IN FEMALE: ICD-10-CM

## 2021-04-05 DIAGNOSIS — Z98.890 S/P LUMPECTOMY OF BREAST: ICD-10-CM

## 2021-04-05 DIAGNOSIS — C50.412 MALIGNANT NEOPLASM OF UPPER-OUTER QUADRANT OF LEFT FEMALE BREAST, UNSPECIFIED ESTROGEN RECEPTOR STATUS (HCC): Primary | ICD-10-CM

## 2021-04-05 PROCEDURE — 97110 THERAPEUTIC EXERCISES: CPT | Performed by: PHYSICAL THERAPY ASSISTANT

## 2021-04-05 PROCEDURE — 99213 OFFICE O/P EST LOW 20 MIN: CPT | Performed by: NURSE PRACTITIONER

## 2021-04-05 PROCEDURE — 97140 MANUAL THERAPY 1/> REGIONS: CPT | Performed by: PHYSICAL THERAPY ASSISTANT

## 2021-04-05 NOTE — PROGRESS NOTES
HISTORY OF PRESENT ILLNESS  Kezia Yanez is a 50 y.o. female. HPI ESTABLISHED patient presents for follow-up to LEFT breast cancer. Denies breast mass, skin changes, nipple discharge and pain.        Breast cancer-  Referring - Dr. Jayy Bailey  1/31/2020 - T2 N1 M0 (Stage IIA) Invasive ductal carcinoma, Tumor size 2 cm, grade 3, ER 0%, ME 0%, Her 2 3+  2/7/2020 - bone scans and CT scans. Showed benign fibroid uterus, O/w clear  2/24/2020 - RIGHT breast MRI guided biopsy. Benign. 2/25/2020 - port insertion in IR  2/28/2020 - started neoadjuvant TCH-P - Dr. Shira Mcdonald  7/9/2020 - LEFT breast lumpectomy and LEFT SLNB. PCR. - Dr. Aaliyah Johnson  9/2020 - completed XRT - Dr. Rosy Zarate   3/2021 - anticipate finishing HP with Dr. Shira Mcdonald     No family history of breast or ovarian cancer. The patient is genetic test negative [facility] Next 2/2020). OB History    No obstetric history on file. Obstetric Comments   Menarche:  13. LMP: 7/8/20  # of Children:  4. Age at Delivery of First Child:  30.   Hysterectomy/oophorectomy:  YES/NO. Breast Bx:  ? [place] Leisure Hx of Breast Feeding:  yes. BCP:  yes. Hormone therapy:  no.                    Past Surgical History:   Procedure Laterality Date    HX BREAST BIOPSY Left 07/24/2018    Left sebaceous cyst surgically removed    HX BREAST BIOPSY Right     HX BREAST LUMPECTOMY Left 7/9/2020    LEFT BREAST WIRE-LOCALIZED LUMPECTOMY AND LEFT BREAST SENTINEL NODE BIOPSY performed by Armond Lackey [facility] at [facility] AM[facility]UL[facility]    HX CYST INCISION AND DRAINAGE Left 20+ yrs ago    HX HEENT      tonsillectomy    HX VASCULAR ACCESS Right 02/25/2020    right chest area    IR INSERT TUNL CVC W PORT OVER 5 YEARS  2/25/2020         [facility] Results (most recent):  Results from [place] encounter on 02/12/21   [facility] 3D SANDHYA W MAMMO BI DX INCL CAD    Narrative STUDY:  Bilateral Diagnostic Digital Mammography with tomosynthesis    INDICATION:  Left lumpectomy for carcinoma in 2020.  Surgical pathology  demonstrated no residual carcinoma. A fibrotic tumor bed with a needle biopsy  site was seen. Patient also had a benign right breast MRI guided biopsy  performed in February 2020. COMPARISON:  [id number]    BREAST COMPOSITION: The breast tissue is heterogeneously dense, which could  obscure detection of small masses (approximately 51-75% glandular). FINDINGS: Bilateral digital diagnostic mammography with tomosynthesis was  performed, and is interpreted in conjunction with a computer assisted detection  (CAD) system. Lumpectomy changes are noted in the left breast upper outer  quadrant. At the lumpectomy bed, in the upper outer quadrant 1-2 o'clock  posterior depth 11 cm from the nipple there are grouped coarse heterogeneous  calcifications. In the right breast there are no suspicious masses or  microcalcifications. Impression  BI-RADS Assessment Category 3: Probably benign finding. Short-interval  follow-up suggested. Left breast calcifications at the lumpectomy bed, strongly  favored to be dystrophic calcifications. This is the patient's first  postlumpectomy mammogram.    Recommendation:  Left breast diagnostic mammography in 6 months. Elective biopsy was also discussed with the patient. She is going to discuss the  finding and options with her surgeon. The patient has been notified of these results and recommendations. ROS    Physical Exam  Constitutional:       Appearance: Normal appearance. Chest:      Breasts:         Right: No mass, nipple discharge, skin change or tenderness. Left: No mass, nipple discharge, skin change or tenderness. Comments: Well healed surgical incisions to LEFT breast and axilla - LEFT breast post treatment changes softer than on previous exams  Musculoskeletal: Normal range of motion.       Comments: UE x 2 - tightness to LEFT axilla and shoulder   Lymphadenopathy:      Upper Body:      Right upper body: No supraclavicular or axillary adenopathy. Left upper body: No supraclavicular or axillary adenopathy. Neurological:      Mental Status: She is alert. Psychiatric:         Attention and Perception: Attention normal.         Mood and Affect: Mood normal.         Speech: Speech normal.         Behavior: Behavior normal.         ASSESSMENT and PLAN  Encounter Diagnoses   Name Primary?  Malignant neoplasm of upper-outer quadrant of left female breast, unspecified estrogen receptor status ([facility] [address] Yes    S/P lumpectomy of breast     S/P radiation therapy     History of breast cancer in female       Normal exam with no evidence of local recurrence. Post treatment changes to LEFT breast softening. LEFT axilla - tight - continues to work with Vivian Shield. Port removal tomorrow with Dr. Dustin Beckwith. Has follow-up with Dr. Trevor Kinsey. LDmammogram 3D in 8/2021. Will discuss additional imaging at her next appt. RTC in 6 months or sooner PRN. She is comfortable with this plan. All questions answered and she stated understanding. Total time spent for this patient - 20 minutes.

## 2021-04-05 NOTE — PROGRESS NOTES
PT DAILY TREATMENT NOTE - [facility] 2-15    Patient Name: Selene Arcos  Date:2021  : 1975  [x]  Patient  Verified  Payor: Marivel Doyle / Plan: Gela Gutierrez / Product Type: HMO /    In time:11:30 AM Out time: 12:25 PM  Total Treatment Time (min):55   Total Timed Codes (min): 45  1:1 Treatment Time ( [address] only): 39   Visit #:  4    Treatment Area: Pain in left shoulder [M25.512]    SUBJECTIVE  Pain Level (0-10 scale): 0/10 at rest, 1/10 with movement  Any medication changes, allergies to medications, adverse drug reactions, diagnosis change, or new procedure performed?: [x] No    [] Yes (see summary sheet for update)  Subjective functional status/changes:   [] No changes reported  Pt states \"I'm feeling pretty good today. I saw my doctor and she said everything is looking great! \" her next f/u is 6 months, she is having her port removed tomorrow and EKG on Wednesday.      OBJECTIVE    Modality rationale: decrease inflammation and decrease pain to improve the patients ability to reach, lift, perform ADL's without pain/limitation   Min Type Additional Details       [] Estim: []Att   []Unatt    []TENS instruct                  []IFC  []Premod   []NMES                     []Other:  []w/US   []w/ice   []w/heat  Position:  Location:       []  Traction: [] Cervical       []Lumbar                       [] Prone          []Supine                       []Intermittent   []Continuous Lbs:  [] before manual  [] after manual  []w/heat    []  Ultrasound: []Continuous   [] Pulsed                       at: []1MHz   []3MHz Location:  W/cm2:    [] Paraffin         Location:   []w/heat   10 []  Ice     [x]  Heat  []  Ice massage Position: supine  Location: neck, L shoulder    []  Laser  []  Other: Position:  Location:      []  Vasopneumatic Device Pressure:       [] lo [] med [] hi   Temperature:      [x] Skin assessment post-treatment:  [x]intact []redness- no adverse reaction    []redness - adverse reaction:     15 min Therapeutic Exercise:  [x] See flow sheet : reviewed HEP, added per flow sheet   Rationale: increase ROM, increase strength and improve coordination to improve the patients ability to reach, lift, perform ADL's without pain/limitation    30 min Manual Therapy: gentle oscillations to reduce muscle guarding and pain, PROM L shoulder all motions to tolerance, STM L pec, subscap, subclavius, post deltoid, B UT/LS, paracervical m, bicep/tricep, L breast pumping inf,med/lat to tolerance   Rationale: decrease pain, increase ROM, increase tissue extensibility and decrease trigger points to improve the patients ability to reach, lift, perform ADL's without pain/limitation            With   [x] TE   [] TA   [] Neuro   [] SC   [] other: Patient Education: [x] Review HEP    [] Progressed/Changed HEP based on:   [] positioning   [] body mechanics   [] transfers   [x] heat/ice application    [] other: reviewed neutral cervical posture in sitting/standing      Other Objective/Functional Measures: NT             Pain Level (0-10 scale) post treatment: 0/10    ASSESSMENT/Changes in Function:   ROM steadily improving, able to perform TB shoulder rows today without aggravation of symptoms. Patient will continue to benefit from skilled PT services to modify and progress therapeutic interventions, address functional mobility deficits, address ROM deficits, address strength deficits, analyze and address soft tissue restrictions, analyze and cue movement patterns, analyze and modify body mechanics/ergonomics and instruct in home and community integration to attain remaining goals.      []  See Plan of Care  []  See progress note/recertification  []  See Discharge Summary         Progress towards goals / Updated goals:  NT    PLAN  [x]  Upgrade activities as tolerated     [x]  Continue plan of care  []  Update interventions per flow sheet       []  Discharge due to:_  []  Other:_      Lieutenant Hardy, PTA 4/5/2021

## 2021-04-06 ENCOUNTER — HOSPITAL ENCOUNTER (OUTPATIENT)
Age: 46
Setting detail: OUTPATIENT SURGERY
Discharge: HOME | End: 2021-04-06
Attending: SURGERY | Admitting: SURGERY
Payer: COMMERCIAL

## 2021-04-06 ENCOUNTER — ANESTHESIA (OUTPATIENT)
Dept: MEDSURG UNIT | Age: 46
End: 2021-04-06
Payer: COMMERCIAL

## 2021-04-06 VITALS
BODY MASS INDEX: 28.32 KG/M2 | DIASTOLIC BLOOD PRESSURE: 73 MMHG | HEART RATE: 68 BPM | TEMPERATURE: 98.2 F | SYSTOLIC BLOOD PRESSURE: 132 MMHG | RESPIRATION RATE: 15 BRPM | OXYGEN SATURATION: 100 % | WEIGHT: 170 LBS | HEIGHT: 65 IN

## 2021-04-06 DIAGNOSIS — Z17.1 MALIGNANT NEOPLASM OF LEFT BREAST IN FEMALE, ESTROGEN RECEPTOR NEGATIVE, UNSPECIFIED SITE OF BREAST (HCC): ICD-10-CM

## 2021-04-06 DIAGNOSIS — C50.912 MALIGNANT NEOPLASM OF LEFT BREAST IN FEMALE, ESTROGEN RECEPTOR NEGATIVE, UNSPECIFIED SITE OF BREAST (HCC): ICD-10-CM

## 2021-04-06 LAB — HCG UR QL: NEGATIVE

## 2021-04-06 PROCEDURE — 77030040361 HC SLV COMPR DVT MDII -B: Performed by: SURGERY

## 2021-04-06 PROCEDURE — 76060000061 HC AMB SURG ANES 0.5 TO 1 HR: Performed by: SURGERY

## 2021-04-06 PROCEDURE — 36590 REMOVAL TUNNELED CV CATH: CPT | Performed by: SURGERY

## 2021-04-06 PROCEDURE — 74011250636 HC RX REV CODE- 250/636: Performed by: NURSE ANESTHETIST, CERTIFIED REGISTERED

## 2021-04-06 PROCEDURE — 77030040922 HC BLNKT HYPOTHRM STRY -A

## 2021-04-06 PROCEDURE — 77030002933 HC SUT MCRYL J&J -A: Performed by: SURGERY

## 2021-04-06 PROCEDURE — 74011250636 HC RX REV CODE- 250/636: Performed by: ANESTHESIOLOGY

## 2021-04-06 PROCEDURE — 77030031139 HC SUT VCRL2 J&J -A: Performed by: SURGERY

## 2021-04-06 PROCEDURE — 76210000034 HC AMBSU PH I REC 0.5 TO 1 HR: Performed by: SURGERY

## 2021-04-06 PROCEDURE — 2709999900 HC NON-CHARGEABLE SUPPLY: Performed by: SURGERY

## 2021-04-06 PROCEDURE — 74011000250 HC RX REV CODE- 250: Performed by: SURGERY

## 2021-04-06 PROCEDURE — 2709999900 HC NON-CHARGEABLE SUPPLY

## 2021-04-06 PROCEDURE — 81025 URINE PREGNANCY TEST: CPT

## 2021-04-06 PROCEDURE — 76030000000 HC AMB SURG OR TIME 0.5 TO 1: Performed by: SURGERY

## 2021-04-06 PROCEDURE — 77030010507 HC ADH SKN DERMBND J&J -B: Performed by: SURGERY

## 2021-04-06 RX ORDER — SODIUM CHLORIDE 0.9 % (FLUSH) 0.9 %
5-40 SYRINGE (ML) INJECTION EVERY 8 HOURS
Status: DISCONTINUED | OUTPATIENT
Start: 2021-04-06 | End: 2021-04-06 | Stop reason: HOSPADM

## 2021-04-06 RX ORDER — SODIUM CHLORIDE 0.9 % (FLUSH) 0.9 %
5-40 SYRINGE (ML) INJECTION AS NEEDED
Status: DISCONTINUED | OUTPATIENT
Start: 2021-04-06 | End: 2021-04-06 | Stop reason: HOSPADM

## 2021-04-06 RX ORDER — MIDAZOLAM HYDROCHLORIDE 1 MG/ML
1 INJECTION, SOLUTION INTRAMUSCULAR; INTRAVENOUS AS NEEDED
Status: DISCONTINUED | OUTPATIENT
Start: 2021-04-06 | End: 2021-04-06 | Stop reason: HOSPADM

## 2021-04-06 RX ORDER — ONDANSETRON 2 MG/ML
4 INJECTION INTRAMUSCULAR; INTRAVENOUS AS NEEDED
Status: DISCONTINUED | OUTPATIENT
Start: 2021-04-06 | End: 2021-04-06 | Stop reason: HOSPADM

## 2021-04-06 RX ORDER — OXYCODONE HYDROCHLORIDE 5 MG/1
5 TABLET ORAL AS NEEDED
Status: DISCONTINUED | OUTPATIENT
Start: 2021-04-06 | End: 2021-04-06 | Stop reason: HOSPADM

## 2021-04-06 RX ORDER — PROPOFOL 10 MG/ML
INJECTION, EMULSION INTRAVENOUS AS NEEDED
Status: DISCONTINUED | OUTPATIENT
Start: 2021-04-06 | End: 2021-04-06 | Stop reason: HOSPADM

## 2021-04-06 RX ORDER — CHOLECALCIFEROL (VITAMIN D3) 50 MCG
CAPSULE ORAL
COMMUNITY

## 2021-04-06 RX ORDER — SODIUM CHLORIDE, SODIUM LACTATE, POTASSIUM CHLORIDE, CALCIUM CHLORIDE 600; 310; 30; 20 MG/100ML; MG/100ML; MG/100ML; MG/100ML
125 INJECTION, SOLUTION INTRAVENOUS CONTINUOUS
Status: DISCONTINUED | OUTPATIENT
Start: 2021-04-06 | End: 2021-04-06 | Stop reason: HOSPADM

## 2021-04-06 RX ORDER — LORATADINE 10 MG/1
10 TABLET ORAL
COMMUNITY

## 2021-04-06 RX ORDER — DEXTROSE, SODIUM CHLORIDE, SODIUM LACTATE, POTASSIUM CHLORIDE, AND CALCIUM CHLORIDE 5; .6; .31; .03; .02 G/100ML; G/100ML; G/100ML; G/100ML; G/100ML
125 INJECTION, SOLUTION INTRAVENOUS CONTINUOUS
Status: DISCONTINUED | OUTPATIENT
Start: 2021-04-06 | End: 2021-04-06 | Stop reason: HOSPADM

## 2021-04-06 RX ORDER — MIDAZOLAM HYDROCHLORIDE 1 MG/ML
1 INJECTION, SOLUTION INTRAMUSCULAR; INTRAVENOUS
Status: DISCONTINUED | OUTPATIENT
Start: 2021-04-06 | End: 2021-04-06 | Stop reason: HOSPADM

## 2021-04-06 RX ORDER — ACETAMINOPHEN 325 MG/1
650 TABLET ORAL ONCE
Status: DISCONTINUED | OUTPATIENT
Start: 2021-04-06 | End: 2021-04-06 | Stop reason: HOSPADM

## 2021-04-06 RX ORDER — FENTANYL CITRATE 50 UG/ML
INJECTION, SOLUTION INTRAMUSCULAR; INTRAVENOUS AS NEEDED
Status: DISCONTINUED | OUTPATIENT
Start: 2021-04-06 | End: 2021-04-06 | Stop reason: HOSPADM

## 2021-04-06 RX ORDER — FENTANYL CITRATE 50 UG/ML
25 INJECTION, SOLUTION INTRAMUSCULAR; INTRAVENOUS
Status: DISCONTINUED | OUTPATIENT
Start: 2021-04-06 | End: 2021-04-06 | Stop reason: HOSPADM

## 2021-04-06 RX ORDER — MORPHINE SULFATE 2 MG/ML
2 INJECTION, SOLUTION INTRAMUSCULAR; INTRAVENOUS
Status: DISCONTINUED | OUTPATIENT
Start: 2021-04-06 | End: 2021-04-06 | Stop reason: HOSPADM

## 2021-04-06 RX ORDER — MIDAZOLAM HYDROCHLORIDE 1 MG/ML
INJECTION, SOLUTION INTRAMUSCULAR; INTRAVENOUS AS NEEDED
Status: DISCONTINUED | OUTPATIENT
Start: 2021-04-06 | End: 2021-04-06 | Stop reason: HOSPADM

## 2021-04-06 RX ORDER — FENTANYL CITRATE 50 UG/ML
50 INJECTION, SOLUTION INTRAMUSCULAR; INTRAVENOUS AS NEEDED
Status: DISCONTINUED | OUTPATIENT
Start: 2021-04-06 | End: 2021-04-06 | Stop reason: HOSPADM

## 2021-04-06 RX ORDER — DIPHENHYDRAMINE HYDROCHLORIDE 50 MG/ML
12.5 INJECTION, SOLUTION INTRAMUSCULAR; INTRAVENOUS AS NEEDED
Status: DISCONTINUED | OUTPATIENT
Start: 2021-04-06 | End: 2021-04-06 | Stop reason: HOSPADM

## 2021-04-06 RX ORDER — LIDOCAINE HYDROCHLORIDE 10 MG/ML
0.5 INJECTION, SOLUTION EPIDURAL; INFILTRATION; INTRACAUDAL; PERINEURAL AS NEEDED
Status: DISCONTINUED | OUTPATIENT
Start: 2021-04-06 | End: 2021-04-06 | Stop reason: HOSPADM

## 2021-04-06 RX ORDER — ONDANSETRON 2 MG/ML
INJECTION INTRAMUSCULAR; INTRAVENOUS AS NEEDED
Status: DISCONTINUED | OUTPATIENT
Start: 2021-04-06 | End: 2021-04-06 | Stop reason: HOSPADM

## 2021-04-06 RX ORDER — PROPOFOL 10 MG/ML
INJECTION, EMULSION INTRAVENOUS
Status: DISCONTINUED | OUTPATIENT
Start: 2021-04-06 | End: 2021-04-06 | Stop reason: HOSPADM

## 2021-04-06 RX ADMIN — MIDAZOLAM 2 MG: 1 INJECTION INTRAMUSCULAR; INTRAVENOUS at 09:06

## 2021-04-06 RX ADMIN — PROPOFOL 75 MCG/KG/MIN: 10 INJECTION, EMULSION INTRAVENOUS at 09:08

## 2021-04-06 RX ADMIN — FENTANYL CITRATE 25 MCG: 50 INJECTION, SOLUTION INTRAMUSCULAR; INTRAVENOUS at 09:24

## 2021-04-06 RX ADMIN — PROPOFOL 25 MG: 10 INJECTION, EMULSION INTRAVENOUS at 09:18

## 2021-04-06 RX ADMIN — PROPOFOL 25 MG: 10 INJECTION, EMULSION INTRAVENOUS at 09:15

## 2021-04-06 RX ADMIN — PROPOFOL 25 MG: 10 INJECTION, EMULSION INTRAVENOUS at 09:21

## 2021-04-06 RX ADMIN — FENTANYL CITRATE 25 MCG: 50 INJECTION, SOLUTION INTRAMUSCULAR; INTRAVENOUS at 09:21

## 2021-04-06 RX ADMIN — PROPOFOL 25 MG: 10 INJECTION, EMULSION INTRAVENOUS at 09:12

## 2021-04-06 RX ADMIN — ONDANSETRON HYDROCHLORIDE 4 MG: 2 INJECTION, SOLUTION INTRAMUSCULAR; INTRAVENOUS at 09:13

## 2021-04-06 RX ADMIN — FENTANYL CITRATE 50 MCG: 50 INJECTION, SOLUTION INTRAMUSCULAR; INTRAVENOUS at 09:12

## 2021-04-06 RX ADMIN — SODIUM CHLORIDE, POTASSIUM CHLORIDE, SODIUM LACTATE AND CALCIUM CHLORIDE: 600; 310; 30; 20 INJECTION, SOLUTION INTRAVENOUS at 09:00

## 2021-04-06 NOTE — OP NOTES
[address]  OPERATIVE REPORT    Name:  Alexandra Leigh  MR#:  [id number]  :  1975  ACCOUNT #:  [de-identified]  DATE OF SERVICE:  2021      PREOPERATIVE DIAGNOSIS:  Left breast cancer. POSTOPERATIVE DIAGNOSIS:  Left breast cancer. PROCEDURE PERFORMED:  Right internal jugular port removal.    SURGEON:  Erinn Ricardo MD    ASSISTANT:  Noemi Leon. ANESTHESIA:  MAC.    COMPLICATIONS:  None. SPECIMENS REMOVED:  No specimens. IMPLANTS:  No implants. ESTIMATED BLOOD LOSS:  Minimal.    DRAINS:  No drain. FINDINGS:  Port removed intact. INDICATIONS FOR PROCEDURE:  A 78-year-old female under treatment for left breast cancer and it was time to remove her port. PROCEDURE IN DETAIL:  The patient was seen in the preoperative holding area. Surgical site was marked by surgeon. Informed consent was obtained. She was taken to the operating room and laid in supine position where MAC anesthesia was induced. The right chest wall was prepped and draped in usual fashion and a time-out was performed. 20 mL of local anesthetic was injected at the port site. A 15-blade was used to make an incision at the old scar. Bovie cautery was used to dissect through the port capsule. The catheter was clamped and removed first.  Pressure was held on the port insertion site in the neck and Bovie cautery was used to dissect through the port capsule. This was removed intact. The port tract was oversewn with 3-0 Vicryl. The incision was closed with interrupted 3-0 Vicryl and 4-0 subcuticular Monocryl. Skin glue was placed on the incision. All sponge, needle, and instrument counts were correct. The patient went to Recovery in stable condition.         Darryle Comas, MD [id number]  D:  2021 9:40  T:  2021 10:43  JOB #:  [id number]

## 2021-04-06 NOTE — ANESTHESIA PREPROCEDURE EVALUATION
Relevant Problems   No relevant active problems       Anesthetic History   No history of anesthetic complications            Review of Systems / Medical History  Patient summary reviewed, nursing notes reviewed and pertinent labs reviewed    Pulmonary  Within defined limits                 Neuro/Psych         Psychiatric history     Cardiovascular  Within defined limits                Exercise tolerance: >4 METS  Comments: 05/20 ECHO= EF 50-55%, trace MR, mild TR   GI/Hepatic/Renal  Within defined limits              Endo/Other        Cancer (lft breast, s/p chemo) and anemia (d/t chemo)     Other Findings              Physical Exam    Airway  Mallampati: II  TM Distance: 4 - 6 cm  Neck ROM: normal range of motion   Mouth opening: Normal     Cardiovascular    Rhythm: regular  Rate: normal         Dental  No notable dental hx       Pulmonary  Breath sounds clear to auscultation               Abdominal  GI exam deferred       Other Findings            Anesthetic Plan    ASA: 2  Anesthesia type: MAC    Monitoring Plan: BIS      Induction: Intravenous  Anesthetic plan and risks discussed with: Patient

## 2021-04-06 NOTE — DISCHARGE INSTRUCTIONS
Discharge Instructions from Dr. Cora Canales    ·   · You may shower, but no hot tubs, swimming pools, or baths until your incision is healed. · No heavy lifting with the affected extremity (nothing greater than 5 pounds), and limit its use for the next 4-5 days. · You may use an ice pack for comfort for the next couple of days, but do not place ice directly on the skin. Rather, use a towel or clothing to serve as a barrier between skin and ice to prevent injury. · If I placed a drain, follow the drain instructions provided, especially as you keep a record of the drain output. · Follow medication instructions carefully. No narcotics given for port removal. May take tylenol or ibuprofen as directed  ·   · You will have bruising and swelling  · Watch for signs of infection as listed below. · Redness  · Swelling  · Drainage from the incision or from your nipple that appears infected  · Fever over 101.5 degrees for consecutive readings, or over 99.5 if you are currently undergoing chemotherapy. · Call our office (number is below) for a follow-up appointment. · If you have any problems, our phone number is [phone]    . DISCHARGE SUMMARY from Nurse      PATIENT INSTRUCTIONS:    After general anesthesia or intravenous sedation, for 24 hours or while taking prescription Narcotics:  · Limit your activities  · Do not drive and operate hazardous machinery  · Do not make important personal or business decisions  · Do  not drink alcoholic beverages  · If you have not urinated within 8 hours after discharge, please contact your surgeon on call.     Report the following to your surgeon:  · Excessive pain, swelling, redness or odor of or around the surgical area  · Temperature over 100.5  · Nausea and vomiting lasting longer than 4 hours or if unable to take medications  · Any signs of decreased circulation or nerve impairment to extremity: change in color, persistent  numbness, tingling, coldness or increase pain  · Any questions  If you experience any of the following symptoms as noted above, please follow up with Dr. Brii Barrios. What to do at Home:  Recommended Activity: See surgical instructions above from Dr. Brii Barrios. Recommended Diet: Resume regular diet as tolerated. Nothing heavy, greasy, fatty, or fried. Please make sure you have food on your stomach prior to taking narcotic pain medication. *  Please give a list of your current medications to your Primary Care Provider. *  Please update this list whenever your medications are discontinued, doses are  changed, or new medications (including over-the-counter products) are added. *  Please carry medication information at all times in case of emergency situations. Community Education:    These are general instructions for a healthy lifestyle:    No smoking/ No tobacco products/ Avoid exposure to second hand smoke. Surgeon General's Warning:  Quitting smoking now greatly reduces serious risk to your health. Obesity, smoking, and sedentary lifestyle greatly increases your risk for illness. A healthy diet, regular physical exercise & weight monitoring are important for maintaining a healthy lifestyle    You may be retaining fluid if you have a history of heart failure or if you experience any of the following symptoms:  Weight gain of 3 pounds or more overnight or 5 pounds in a week, increased swelling in our hands or feet or shortness of breath while lying flat in bed. Please call your doctor as soon as you notice any of these symptoms; do not wait until your next office visit. The discharge information has been reviewed with the patient and spouse. The patient and spouse verbalized understanding.   Discharge medications reviewed with the patient and spouse and appropriate educational materials and side effects teaching were provided.   ___________________________________________________________________________________________________________________________________

## 2021-04-06 NOTE — ROUTINE PROCESS
Patient: Yair Levi MRN: [id number]  SSN: [id number]   YOB: 1975  Age: 50 y.o. Sex: female     Patient is status post Procedure(s):  GERRI CATH REMOVAL (EIP @ 1130).     Surgeon(s) and Role:     Yinka Conley MD - Primary    Local/Dose/Irrigation:  SEE MAR                Venous Access Device Vaccess CT power port IUP#[id number], DUS#[id number] 02/25/20 (Active)      Peripheral IV 04/06/21 Left Wrist (Active)   Site Assessment Clean, dry, & intact 04/06/21 0857   Phlebitis Assessment 0 04/06/21 0857   Infiltration Assessment 0 04/06/21 0857   Dressing Status Clean, dry, & intact 04/06/21 0857   Dressing Type Tape;Transparent 04/06/21 0857   Hub Color/Line Status Infusing 04/06/21 0857   Alcohol Cap Used Yes 04/06/21 0857                           Dressing/Packing:  Incision 04/06/21 Chest Right-Dressing/Treatment: Surgical glue (04/06/21 0931)    Splint/Cast:  ]    Other:

## 2021-04-06 NOTE — BRIEF OP NOTE
Brief Postoperative Note    Patient: Delmar Edmonds  YOB: 1975  MRN: [id number]    Date of Procedure: 4/6/2021     Pre-Op Diagnosis: LEFT BREAST CANCER    Post-Op Diagnosis: Same as preoperative diagnosis. Procedure(s):  GERRI CATH REMOVAL (EIP @ 1130)    Surgeon(s):   Brennan Dobson MD    Surgical Assistant: Surg Asst-1: Navid Kc    Anesthesia: MAC     Estimated Blood Loss (mL): Minimal    Complications: None    Specimens: * No specimens in log *     Implants: * No implants in log *    Drains: * No LDAs found *    Findings: port removed intact    Electronically Signed by Rashida Madera MD on 4/6/2021 at 9:37 AM    Dictated stat

## 2021-04-06 NOTE — PERIOP NOTES
Discharge instructions reviewed with patient and spouse at bedside. Opportunity for questions and clarification provided. Patient and spouse verbalized understanding of discharge instructions and had no additional questions or concerns. Handout of instructions provided. Patient's vital signs within normal limits. Patient denies post-operative pain. Patient tolerating PO intake, denies nausea. Peripheral IV removed with no signs of infiltration. Patient discharged by RN via wheelchair to spouse's care/car and prior home environment from Phase 1 area.

## 2021-04-06 NOTE — H&P
History and Physical    HISTORY OF PRESENT ILLNESS  Bill Richards is a 50 y.o. female. HPI ESTABLISHED patient here for follow up LEFT breast cancer. No breast problems at this time. She has met with Dr. Delmi Mcmullen.     Breast cancer-  1/31/2020 - T2 N1 M0 (Stage IIA) Invasive ductal carcinoma, Tumor size 2 cm, grade 3, ER 0%, OK 0%, Her 2 3+  2/11/2020 - met with Dr. Galina Lopez. Discussed neoadjuvant chemotherapy ([address] + P)  2/24/2020 - RIGHT breast MRI guided biopsy. Benign. 2/25/2020 - port insertion in IR  2/28/2020 - started neoadjuvant TCH-P - Dr. Galina Lopez  7/9/2020 - LEFT breast lumpectomy and LEFT SLNB. PCR.     No family history of breast or ovarian cancer. The patient is genetic test negative [facility] Next 2/2020).    Imaging-  2/12/2020 - breast mri  2/7/2020 - bone scans and CT scans. Showed benign fibroid uterus, O/w clear  1/28/2020 - LEFT breast diagnostic mammogram and LEFT breast US     Review of Systems   All other systems reviewed and are negative.        Physical Exam  Vitals signs and nursing note reviewed. Chest:          Comments: Left axillary incision healing well  No seroma  Port intact          ASSESSMENT and PLAN      ICD-10-CM ICD-9-CM     1. Malignant neoplasm of upper-outer quadrant of left female breast, unspecified estrogen receptor status ([facility] [address]  C50.412 174. 4       Port removal

## 2021-04-06 NOTE — ANESTHESIA POSTPROCEDURE EVALUATION
Procedure(s):  GERRI CATH REMOVAL. MAC    Anesthesia Post Evaluation        Patient location during evaluation: PACU  Patient participation: complete - patient participated  Level of consciousness: awake and alert  Pain management: adequate  Airway patency: patent  Anesthetic complications: no  Cardiovascular status: acceptable  Respiratory status: acceptable  Hydration status: acceptable  Comments: I have seen and evaluated the patient and is ready for discharge. Will Grullon MD    Post anesthesia nausea and vomiting:  none      INITIAL Post-op Vital signs:   Vitals Value Taken Time   /74 04/06/21 0955   Temp 36.8 °C (98.2 °F) 04/06/21 0943   Pulse 86 04/06/21 1000   Resp 19 04/06/21 1000   SpO2 100 % 04/06/21 1000   Vitals shown include unvalidated device data.

## 2021-04-07 ENCOUNTER — HOSPITAL ENCOUNTER (OUTPATIENT)
Dept: NON INVASIVE DIAGNOSTICS | Age: 46
Discharge: HOME | End: 2021-04-07
Attending: INTERNAL MEDICINE
Payer: COMMERCIAL

## 2021-04-07 ENCOUNTER — HOSPITAL ENCOUNTER (OUTPATIENT)
Dept: PHYSICAL THERAPY | Age: 46
Discharge: HOME | End: 2021-04-07
Payer: COMMERCIAL

## 2021-04-07 VITALS
BODY MASS INDEX: 28.32 KG/M2 | HEIGHT: 65 IN | DIASTOLIC BLOOD PRESSURE: 73 MMHG | SYSTOLIC BLOOD PRESSURE: 132 MMHG | WEIGHT: 169.97 LBS

## 2021-04-07 DIAGNOSIS — Z51.81 ENCOUNTER FOR MONITORING CARDIOTOXIC DRUG THERAPY: ICD-10-CM

## 2021-04-07 DIAGNOSIS — Z79.899 ENCOUNTER FOR MONITORING CARDIOTOXIC DRUG THERAPY: ICD-10-CM

## 2021-04-07 DIAGNOSIS — C50.912 MALIGNANT NEOPLASM OF LEFT BREAST IN FEMALE, ESTROGEN RECEPTOR NEGATIVE, UNSPECIFIED SITE OF BREAST (HCC): ICD-10-CM

## 2021-04-07 DIAGNOSIS — Z17.1 MALIGNANT NEOPLASM OF LEFT BREAST IN FEMALE, ESTROGEN RECEPTOR NEGATIVE, UNSPECIFIED SITE OF BREAST (HCC): ICD-10-CM

## 2021-04-07 LAB
ECHO AO ROOT DIAM: 2.97 CM
ECHO AV AREA PEAK VELOCITY: 1.87 CM2
ECHO AV AREA PEAK VELOCITY: 1.89 CM2
ECHO AV AREA PEAK VELOCITY: 2 CM2
ECHO AV AREA PEAK VELOCITY: 2.02 CM2
ECHO AV AREA VTI: 1.81 CM2
ECHO AV AREA/BSA VTI: 1 CM2/M2
ECHO AV MEAN GRADIENT: 3.31 MMHG
ECHO AV PEAK GRADIENT: 5.4 MMHG
ECHO AV PEAK GRADIENT: 5.49 MMHG
ECHO AV PEAK VELOCITY: 116.1 CM/S
ECHO AV PEAK VELOCITY: 117.16 CM/S
ECHO AV VTI: 25.42 CM
ECHO EST RA PRESSURE: 10 MMHG
ECHO LA AREA 4C: 17.23 CM2
ECHO LA MAJOR AXIS: 2.68 CM
ECHO LA MINOR AXIS: 1.45 CM
ECHO LA VOL 4C: 44.79 ML (ref 22–52)
ECHO LA VOLUME INDEX A4C: 24.26 ML/M2 (ref 16–28)
ECHO LV E' LATERAL VELOCITY: 12.81 CM/S
ECHO LV E' SEPTAL VELOCITY: 7.57 CM/S
ECHO LV INTERNAL DIMENSION DIASTOLIC: 4.15 CM (ref 3.9–5.3)
ECHO LV INTERNAL DIMENSION SYSTOLIC: 2.67 CM
ECHO LV IVSD: 0.8 CM (ref 0.6–0.9)
ECHO LV MASS 2D: 84.1 G (ref 67–162)
ECHO LV MASS INDEX 2D: 45.6 G/M2 (ref 43–95)
ECHO LV POSTERIOR WALL DIASTOLIC: 0.61 CM (ref 0.6–0.9)
ECHO LVOT CARDIAC OUTPUT: 3.14 LITER/MINUTE
ECHO LVOT DIAM: 1.85 CM
ECHO LVOT PEAK GRADIENT: 2.68 MMHG
ECHO LVOT PEAK GRADIENT: 3.06 MMHG
ECHO LVOT PEAK VELOCITY: 81.84 CM/S
ECHO LVOT PEAK VELOCITY: 87.42 CM/S
ECHO LVOT SV: 46 ML
ECHO LVOT VTI: 17.14 CM
ECHO MV A VELOCITY: 58.82 CM/S
ECHO MV AREA PHT: 4.01 CM2
ECHO MV AREA VTI: 2.46 CM2
ECHO MV E DECELERATION TIME (DT): 179.09 MS
ECHO MV E VELOCITY: 73.41 CM/S
ECHO MV E/A RATIO: 1.25
ECHO MV E/E' LATERAL: 5.73
ECHO MV E/E' RATIO (AVERAGED): 7.71
ECHO MV E/E' SEPTAL: 9.7
ECHO MV MAX VELOCITY: 83.84 CM/S
ECHO MV MEAN GRADIENT: 1.11 MMHG
ECHO MV PEAK GRADIENT: 2.81 MMHG
ECHO MV PRESSURE HALF TIME (PHT): 54.89 MS
ECHO MV VTI: 18.66 CM
ECHO PV MAX VELOCITY: 90.4 CM/S
ECHO PV MAX VELOCITY: 91.11 CM/S
ECHO PV MEAN GRADIENT: 2.07 MMHG
ECHO PV PEAK INSTANTANEOUS GRADIENT SYSTOLIC: 3.27 MMHG
ECHO PV PEAK INSTANTANEOUS GRADIENT SYSTOLIC: 3.32 MMHG
ECHO PV VTI: 20.07 CM
ECHO RA AREA 4C: 13.7 CM2
ECHO RIGHT VENTRICULAR SYSTOLIC PRESSURE (RVSP): 29.48 MMHG
ECHO TV REGURGITANT MAX VELOCITY: 219.8 CM/S
ECHO TV REGURGITANT PEAK GRADIENT: 19.48 MMHG
LVOT MG: 1.6 MMHG

## 2021-04-07 PROCEDURE — 93306 TTE W/DOPPLER COMPLETE: CPT

## 2021-04-07 PROCEDURE — 97110 THERAPEUTIC EXERCISES: CPT | Performed by: PHYSICAL THERAPIST

## 2021-04-07 PROCEDURE — 97140 MANUAL THERAPY 1/> REGIONS: CPT | Performed by: PHYSICAL THERAPIST

## 2021-04-07 NOTE — PROGRESS NOTES
Physical Therapy at [facility],   a part of [facility]  [address], [address]  Phone: [phone] Fax: [phone]    Progress Note    Name: Margi Calle   : 1975   MD: Nola Qiu MD       Treatment Diagnosis: Pain in left shoulder [M25.512]  Start of Care: 3/10/2021    Visits from Start of Care: 7  Missed Visits: 0    Summary of Care: Pt has been receiving PT interventions for L shoulder pain and stiffness. Pt is making steady gains towards rehab goal completion with increased motion, improved posture and breath pattern. Assessment / Recommendations: Continuation of PT 1-2xwk for an additional 4 weeks in order to complete rehab goals below. Goal Status:   1) Pt will verbalize understanding of infection and lymphedema risk reduction practices. MET  2) Pt will demonstrate [place] in initial HEP for breathing and postural exercisesPROGRESSING  3) Pt will verbalize knowledge of signs and symptoms to report to physician. MET  4) Pt will be have decreased pain by 4-5  levels on NPS when using L UE for all ADL's involving reaching, lifting, pushing, and pulling. PROGRESSING  5) Pt will be Independent with HEP for pain management, utilizing correct breath pattern, strengthening, and motion exercises. PROGRESSING  6) Pt will utilize correct breath and movement patterns during all ADL's involving L UE movements at or above shoulder levelPROGRESSING  7) Pt will have increased motion in L upper quadrant by 20-30 degrees with decreased pain and improved strength by 1-2 muscle grades in order to use L UE for all ADL's involving reaching, lifting, [address] [facility], PT 2021

## 2021-04-07 NOTE — PROGRESS NOTES
PT DAILY TREATMENT NOTE - [facility] -15    Patient Name: Janet Tomas  Date:2021  : 1975  [x]  Patient  Verified  Payor: Juan Delgado / Plan: Rylee Hidalgo / Product Type: HMO /    In time:4:00 Out time: 5:10 PM  Total Treatment Time (min):70   Total Timed Codes (min): 60  1:1 Treatment Time ( [address] only): 60   Visit #:  5    Treatment Area: Pain in left shoulder [M25.512]    SUBJECTIVE  Pain Level (0-10 scale): 0/10 at rest, 1/10 with movement  Any medication changes, allergies to medications, adverse drug reactions, diagnosis change, or new procedure performed?: [x] No    [] Yes (see summary sheet for update)  Subjective functional status/changes:   [] No changes reported  Pt reports that she is \"a little sore after PAC removal\" but pt reports that she \"feels so much lighter\". Pt arrives to PT with much improved affect and reports decreased pain with L shoulder movements. Pt reports that she continues to have pain and difficulty fastening bras and sports bras and reaching behind her.     OBJECTIVE    Modality rationale: decrease inflammation and decrease pain to improve the patients ability to reach, lift, perform ADL's without pain/limitation   Min Type Additional Details       [] Estim: []Att   []Unatt    []TENS instruct                  []IFC  []Premod   []NMES                     []Other:  []w/US   []w/ice   []w/heat  Position:  Location:       []  Traction: [] Cervical       []Lumbar                       [] Prone          []Supine                       []Intermittent   []Continuous Lbs:  [] before manual  [] after manual  []w/heat    []  Ultrasound: []Continuous   [] Pulsed                       at: []1MHz   []3MHz Location:  W/cm2:    [] Paraffin         Location:   []w/heat   10 []  Ice     [x]  Heat  []  Ice massage Position: supine  Location: neck, scapulae    []  Laser  []  Other: Position:  Location:      []  Vasopneumatic Device Pressure:       [] lo [] med [] hi   Temperature:      [x] Skin assessment post-treatment:  [x]intact [x]redness- no adverse reaction    []redness - adverse reaction:     15 min Therapeutic Exercise:  [x] See flow sheet : reviewed HEP, added per flow sheet   Rationale: increase ROM, increase strength and improve coordination to improve the patients ability to reach, lift, perform ADL's without pain/limitation    45 min Manual Therapy: gentle oscillations to reduce muscle guarding and pain, PROM L shoulder all motions to tolerance, STM L pec, subscap, subclavius, post deltoid, B UT/LS, paracervical m, bicep/tricep, L breast pumping inf,med/lat to tolerance, L scap mobs upward rotation of inf angle to tolerance   Rationale: decrease pain, increase ROM, increase tissue extensibility and decrease trigger points to improve the patients ability to reach, lift, perform ADL's without pain/limitation            With   [x] TE   [] TA   [] Neuro   [] SC   [] other: Patient Education: [x] Review HEP    [x] Progressed/Changed HEP based on: subjective and objective assessments   [] positioning   [] body mechanics   [] transfers   [x] heat/ice application    [x] other: reviewed neutral cervical posture in sitting/standing      Other Objective/Functional Measures: L shoulder IR=18 degrees with end range pain,     Pain Level (0-10 scale) post treatment: 1-2/10 with L shoulder rotation    ASSESSMENT/Changes in Function:  Pt TTP L subscap, post deltoid, lat insertion and teres major musculature areas. R chest area avoided d/t port removal.  Added IR stretches to HEP, pt requiring v.c's for form, posture and breath. Reassess performance next visit.   Patient will continue to benefit from skilled PT services to modify and progress therapeutic interventions, address functional mobility deficits, address ROM deficits, address strength deficits, analyze and address soft tissue restrictions, analyze and cue movement patterns, analyze and modify body mechanics/ergonomics and instruct in home and community integration to attain remaining goals.      []  See Plan of Care  [x]  See progress note/recertification  []  See Discharge Summary         Progress towards goals / Updated goals: pt making gains towards goals for increasing motion and improving posture, see progress note      PLAN  [x]  Upgrade activities as tolerated     [x]  Continue plan of care  []  Update interventions per flow sheet       []  Discharge due to:_  []  Other:_      Vivian Styles, PT 4/7/2021

## 2021-04-13 ENCOUNTER — HOSPITAL ENCOUNTER (OUTPATIENT)
Dept: PHYSICAL THERAPY | Age: 46
Discharge: HOME | End: 2021-04-13
Payer: COMMERCIAL

## 2021-04-13 PROCEDURE — 97140 MANUAL THERAPY 1/> REGIONS: CPT | Performed by: PHYSICAL THERAPY ASSISTANT

## 2021-04-13 PROCEDURE — 97110 THERAPEUTIC EXERCISES: CPT | Performed by: PHYSICAL THERAPY ASSISTANT

## 2021-04-13 NOTE — PROGRESS NOTES
PT DAILY TREATMENT NOTE - [facility] 2-15    Patient Name: Karla Green  Date:2021  : 1975  [x]  Patient  Verified  Payor: Marilu Cook / Plan: Cholo Aaron / Product Type: HMO /    In time:7:00 AM Out time: 7:50 AM  Total Treatment Time (min):50   Total Timed Codes (min): 45  1:1 Treatment Time ( [address] only): 39   Visit #:  6     Treatment Area: Pain in left shoulder [M25.512]    SUBJECTIVE  Pain Level (0-10 scale): 0/10 at rest, 1-2/10 with movement  Any medication changes, allergies to medications, adverse drug reactions, diagnosis change, or new procedure performed?: [x] No    [] Yes (see summary sheet for update)  Subjective functional status/changes:   [] No changes reported  Pt reports her shoulder is sore today from cleaning her bathroom yesterday. States continued difficulty reaching behind her back.     OBJECTIVE    Modality rationale: decrease inflammation and decrease pain to improve the patients ability to reach, lift, perform ADL's without pain/limitation   Min Type Additional Details       [] Estim: []Att   []Unatt    []TENS instruct                  []IFC  []Premod   []NMES                     []Other:  []w/US   []w/ice   []w/heat  Position:  Location:       []  Traction: [] Cervical       []Lumbar                       [] Prone          []Supine                       []Intermittent   []Continuous Lbs:  [] before manual  [] after manual  []w/heat    []  Ultrasound: []Continuous   [] Pulsed                       at: []1MHz   []3MHz Location:  W/cm2:    [] Paraffin         Location:   []w/heat   5 []  Ice     [x]  Heat  []  Ice massage Position: supine  Location: neck, scapulae    []  Laser  []  Other: Position:  Location:      []  Vasopneumatic Device Pressure:       [] lo [] med [] hi   Temperature:      [x] Skin assessment post-treatment:  [x]intact [x]redness- no adverse reaction    []redness - adverse reaction:     25 min Therapeutic Exercise:  [x] See flow sheet : reviewed HEP, added per flow sheet   Rationale: increase ROM, increase strength and improve coordination to improve the patients ability to reach, lift, perform ADL's without pain/limitation    20 min Manual Therapy: gentle oscillations to reduce muscle guarding and pain, PROM L shoulder all motions to tolerance, STM L pec, subscap, subclavius, post deltoid, B UT/LS, paracervical m, bicep/tricep, L breast pumping inf,med/lat to tolerance, L scap mobs upward rotation of inf angle to tolerance   Rationale: decrease pain, increase ROM, increase tissue extensibility and decrease trigger points to improve the patients ability to reach, lift, perform ADL's without pain/limitation            With   [x] TE   [] TA   [] Neuro   [] SC   [] other: Patient Education: [x] Review HEP    [x] Progressed/Changed HEP based on: subjective and objective assessments   [] positioning   [] body mechanics   [] transfers   [x] heat/ice application    [x] other: reviewed neutral cervical posture in sitting/standing      Other Objective/Functional Measures:     Pain Level (0-10 scale) post treatment:0/10 \"Relaxed\"    ASSESSMENT/Changes in Function:    Tolerated exercises well, shoulder ROM improving. Patient will continue to benefit from skilled PT services to modify and progress therapeutic interventions, address functional mobility deficits, address ROM deficits, address strength deficits, analyze and address soft tissue restrictions, analyze and cue movement patterns, analyze and modify body mechanics/ergonomics and instruct in home and community integration to attain remaining goals.      []  See Plan of Care  [x]  See progress note/recertification  []  See Discharge Summary         Progress towards goals / Updated goals: NT      PLAN  [x]  Upgrade activities as tolerated     [x]  Continue plan of care  []  Update interventions per flow sheet       []  Discharge due to:_  []  Other:_      Nicole Seats, PTA 4/13/2021

## 2021-04-16 ENCOUNTER — APPOINTMENT (OUTPATIENT)
Dept: INFUSION THERAPY | Age: 46
End: 2021-04-16

## 2021-04-19 ENCOUNTER — HOSPITAL ENCOUNTER (OUTPATIENT)
Dept: PHYSICAL THERAPY | Age: 46
Discharge: HOME | End: 2021-04-19
Payer: COMMERCIAL

## 2021-04-19 PROCEDURE — 97140 MANUAL THERAPY 1/> REGIONS: CPT | Performed by: PHYSICAL THERAPY ASSISTANT

## 2021-04-19 PROCEDURE — 97110 THERAPEUTIC EXERCISES: CPT | Performed by: PHYSICAL THERAPY ASSISTANT

## 2021-04-19 NOTE — PROGRESS NOTES
PT DAILY TREATMENT NOTE - [facility] 2-15    Patient Name: Alyssia Purvis  Date:2021  : 1975  [x]  Patient  Verified  Payor: Madiha Green / Plan: Orlin Reading / Product Type: HMO /    In time: 5:05 PM Out time: 6:00  PM  Total Treatment Time (min):55   Total Timed Codes (min): 45  1:1 Treatment Time ( [address] only): 45   Visit #:  7     Treatment Area: Pain in left shoulder [M25.512]    SUBJECTIVE  Pain Level (0-10 scale): 0/10   Any medication changes, allergies to medications, adverse drug reactions, diagnosis change, or new procedure performed?: [x] No    [] Yes (see summary sheet for update)  Subjective functional status/changes:   [] No changes reported  Pt reports she was away for a 3 day conference and wasn't able to perform her exercises as much as she would like. She feels her shoulder is more stiff today as a result.     OBJECTIVE    Modality rationale: decrease inflammation and decrease pain to improve the patients ability to reach, lift, perform ADL's without pain/limitation   Min Type Additional Details       [] Estim: []Att   []Unatt    []TENS instruct                  []IFC  []Premod   []NMES                     []Other:  []w/US   []w/ice   []w/heat  Position:  Location:       []  Traction: [] Cervical       []Lumbar                       [] Prone          []Supine                       []Intermittent   []Continuous Lbs:  [] before manual  [] after manual  []w/heat    []  Ultrasound: []Continuous   [] Pulsed                       at: []1MHz   []3MHz Location:  W/cm2:    [] Paraffin         Location:   []w/heat   10 []  Ice     [x]  Heat  []  Ice massage Position: supine  Location: neck, scapulae    []  Laser  []  Other: Position:  Location:      []  Vasopneumatic Device Pressure:       [] lo [] med [] hi   Temperature:      [x] Skin assessment post-treatment:  [x]intact [x]redness- no adverse reaction    []redness - adverse reaction:     25 min Therapeutic Exercise:  [x] See flow sheet : reviewed HEP, added per flow sheet   Rationale: increase ROM, increase strength and improve coordination to improve the patients ability to reach, lift, perform ADL's without pain/limitation    20 min Manual Therapy: gentle oscillations to reduce muscle guarding and pain, PROM L shoulder all motions to tolerance, STM L pec, subscap, subclavius, post deltoid, B UT/LS, paracervical m, bicep/tricep, L breast pumping inf,med/lat to tolerance, L scap mobs upward rotation of inf angle to tolerance   Rationale: decrease pain, increase ROM, increase tissue extensibility and decrease trigger points to improve the patients ability to reach, lift, perform ADL's without pain/limitation            With   [x] TE   [] TA   [] Neuro   [] SC   [] other: Patient Education: [x] Review HEP    [x] Progressed/Changed HEP based on: subjective and objective assessments   [] positioning   [] body mechanics   [] transfers   [x] heat/ice application    [x] other: reviewed neutral cervical posture in sitting/standing      Other Objective/Functional Measures:     Pain Level (0-10 scale) post treatment:0/10 \"Relaxed\"    ASSESSMENT/Changes in Function:    Continued TTP along pec. Major/minor and muscle turgor along levator scapula today. Scapular motion improving. Patient will continue to benefit from skilled PT services to modify and progress therapeutic interventions, address functional mobility deficits, address ROM deficits, address strength deficits, analyze and address soft tissue restrictions, analyze and cue movement patterns, analyze and modify body mechanics/ergonomics and instruct in home and community integration to attain remaining goals.      []  See Plan of Care  [x]  See progress note/recertification  []  See Discharge Summary         Progress towards goals / Updated goals: NT      PLAN  [x]  Upgrade activities as tolerated     [x]  Continue plan of care  []  Update interventions per flow sheet       []  Discharge due to:_  [] Other:_      Osmar Hou, PTA 4/19/2021

## 2021-04-21 ENCOUNTER — HOSPITAL ENCOUNTER (OUTPATIENT)
Dept: PHYSICAL THERAPY | Age: 46
Discharge: HOME | End: 2021-04-21
Payer: COMMERCIAL

## 2021-04-21 PROCEDURE — 97140 MANUAL THERAPY 1/> REGIONS: CPT | Performed by: PHYSICAL THERAPIST

## 2021-04-21 PROCEDURE — 97110 THERAPEUTIC EXERCISES: CPT | Performed by: PHYSICAL THERAPIST

## 2021-04-21 NOTE — PROGRESS NOTES
PT DAILY TREATMENT NOTE - [facility] 2-15    Patient Name: Suzanne Jerez  Date:2021  : 1975  [x]  Patient  Verified  Payor: Dav Kaur / Plan: Lino Salas / Product Type: HMO /    In time: 4:05 PM Out time: 5:05  PM  Total Treatment Time (min):55   Total Timed Codes (min): 45  1:1 Treatment Time ( [address] only): 45   Visit #:  8     Treatment Area: Pain in left shoulder [M25.512]    SUBJECTIVE  Pain Level (0-10 scale): 3-4 \"all over ache and stress\"  Any medication changes, allergies to medications, adverse drug reactions, diagnosis change, or new procedure performed?: [x] No    [] Yes (see summary sheet for update)  Subjective functional status/changes:   [] No changes reported  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 and is \"extremely fatigued and tired\"    OBJECTIVE    Modality rationale: decrease inflammation and decrease pain to improve the patients ability to reach, lift, perform ADL's without pain/limitation   Min Type Additional Details       [] Estim: []Att   []Unatt    []TENS instruct                  []IFC  []Premod   []NMES                     []Other:  []w/US   []w/ice   []w/heat  Position:  Location:       []  Traction: [] Cervical       []Lumbar                       [] Prone          []Supine                       []Intermittent   []Continuous Lbs:  [] before manual  [] after manual  []w/heat    []  Ultrasound: []Continuous   [] Pulsed                       at: []1MHz   []3MHz Location:  W/cm2:    [] Paraffin         Location:   []w/heat    []  Ice     []  Heat  []  Ice massage Position:   Location:     []  Laser  []  Other: Position:  Location:      []  Vasopneumatic Device Pressure:       [] lo [] med [] hi   Temperature:      [] Skin assessment post-treatment:  []intact []redness- no adverse reaction    []redness - adverse reaction:     15 min Therapeutic Exercise:  [x] See flow sheet : reviewed HEP, added per flow sheet   Rationale: increase ROM, increase strength and improve coordination to improve the patients ability to reach, lift, perform ADL's without pain/limitation    45 min Manual Therapy: gentle oscillations to reduce muscle guarding and pain, PROM L shoulder all motions to tolerance, STM L pec, subscap, subclavius, post deltoid, B UT/LS, paracervical m, bicep/tricep, L breast pumping inf,med/lat to tolerance, L scap mobs upward rotation of inf angle to tolerance, modified MLD to B AI, parasternal, intercostal   Rationale: decrease pain, increase ROM, increase tissue extensibility and decrease trigger points to improve the patients ability to reach, lift, perform ADL's without pain/limitation            With   [x] TE   [] TA   [] Neuro   [] SC   [] other: Patient Education: [x] Review HEP    [] Progressed/Changed HEP based on: subjective and objective assessments   [] positioning   [] body mechanics   [] transfers   [] heat/ice application    [x] other: monitor edema in R supraclavicular region, no heat in this area      Other Objective/Functional Measures: visual swelling of R supraclavicular region. Vitals: BP R=125/72 SpO2=97% Temp: 95.2 HR=75    Pain Level (0-10 scale) post treatment: 2-3 \"neck tightness\"    ASSESSMENT/Changes in Function: Pt's visible swelling in R supraclavicular region of concern. Vitals WNL's. Pt instructed to monitor and report to physician if discomfort or pain or increased swelling is observed. Patient will continue to benefit from skilled PT services to modify and progress therapeutic interventions, address functional mobility deficits, address ROM deficits, address strength deficits, analyze and address soft tissue restrictions, analyze and cue movement patterns, analyze and modify body mechanics/ergonomics and instruct in home and community integration to attain remaining goals.      []  See Plan of Care  []  See progress note/recertification  []  See Discharge Summary         Progress towards goals / Updated goals:       PLAN  [x] Upgrade activities as tolerated     [x]  Continue plan of care  []  Update interventions per flow sheet       []  Discharge due to:_  [x]  Other: Re-assess swelling, add scalene and SCM gentle stretching exercises to tolerance     [address], PT 4/21/2021

## 2021-04-26 ENCOUNTER — HOSPITAL ENCOUNTER (OUTPATIENT)
Dept: PHYSICAL THERAPY | Age: 46
Discharge: HOME | End: 2021-04-26
Payer: COMMERCIAL

## 2021-04-26 PROCEDURE — 97140 MANUAL THERAPY 1/> REGIONS: CPT | Performed by: PHYSICAL THERAPY ASSISTANT

## 2021-04-26 PROCEDURE — 97110 THERAPEUTIC EXERCISES: CPT | Performed by: PHYSICAL THERAPY ASSISTANT

## 2021-04-26 NOTE — PROGRESS NOTES
PT DAILY TREATMENT NOTE - [facility] 2-15    Patient Name: Kendal Mata  Date:2021  : 1975  [x]  Patient  Verified  Payor: Jessie Robles / Plan: Miguelina Us / Product Type: HMO /    In time: 4:15 PM Out time: 5:05  PM  Total Treatment Time (min):50  Total Timed Codes (min): 40  1:1 Treatment Time ( [address] only): 40   Visit #:  9     Treatment Area: Pain in left shoulder [M25.512]    SUBJECTIVE  Pain Level (0-10 scale):1-2/10  Any medication changes, allergies to medications, adverse drug reactions, diagnosis change, or new procedure performed?: [x] No    [] Yes (see summary sheet for update)  Subjective functional status/changes:   [] No changes reported  Pt reports \"my neck doesn't seem any different to me comparing each side. \" States she hasn't felt the need to contact her MD. [place] shoulder feels a little stiff today. \"    OBJECTIVE    Modality rationale: decrease inflammation and decrease pain to improve the patients ability to reach, lift, perform ADL's without pain/limitation   Min Type Additional Details       [] Estim: []Att   []Unatt    []TENS instruct                  []IFC  []Premod   []NMES                     []Other:  []w/US   []w/ice   []w/heat  Position:  Location:       []  Traction: [] Cervical       []Lumbar                       [] Prone          []Supine                       []Intermittent   []Continuous Lbs:  [] before manual  [] after manual  []w/heat    []  Ultrasound: []Continuous   [] Pulsed                       at: []1MHz   []3MHz Location:  W/cm2:    [] Paraffin         Location:   []w/heat   10 []  Ice     [x]  Heat  []  Ice massage Position: supine with LE's elevated  Location: L shoulder    []  Laser  []  Other: Position:  Location:      []  Vasopneumatic Device Pressure:       [] lo [] med [] hi   Temperature:      [] Skin assessment post-treatment:  []intact []redness- no adverse reaction    []redness - adverse reaction:     15 min Therapeutic Exercise:  [x] See flow sheet Isidro Song reviewed HEP, added per flow sheet   Rationale: increase ROM, increase strength and improve coordination to improve the patients ability to reach, lift, perform ADL's without pain/limitation    25 min Manual Therapy: gentle oscillations to reduce muscle guarding and pain, PROM L shoulder all motions to tolerance, s/l scapula mobs   Rationale: decrease pain, increase ROM, increase tissue extensibility and decrease trigger points to improve the patients ability to reach, lift, perform ADL's without pain/limitation            With   [x] TE   [] TA   [] Neuro   [] SC   [] other: Patient Education: [x] Review HEP    [] Progressed/Changed HEP based on: subjective and objective assessments   [] positioning   [] body mechanics   [] transfers   [] heat/ice application    [] other:      Other Objective/Functional Measures:     L shoulder AROM:  Flexion: 143 deg  Abduction: 129 deg  Functional ER: T2           IR: L5    Observation: no supraclavicular edema present on L, appears symmetrical in nature. Pain Level (0-10 scale) post treatment: 0/10    ASSESSMENT/Changes in Function:   Patients without edema present along supraclavicular region on L and appears symmetrical to contralateral side. Able to perform exercises without aggravation in symptoms. AROM L shoulder improving. Patient will continue to benefit from skilled PT services to modify and progress therapeutic interventions, address functional mobility deficits, address ROM deficits, address strength deficits, analyze and address soft tissue restrictions, analyze and cue movement patterns, analyze and modify body mechanics/ergonomics and instruct in home and community integration to attain remaining goals.      []  See Plan of Care  []  See progress note/recertification  []  See Discharge Summary         Progress towards goals / Updated goals:       PLAN  [x]  Upgrade activities as tolerated     [x]  Continue plan of care  []  Update interventions per flow sheet []  Discharge due to:_  [x]  Other: Re-assess swelling, add scalene and SCM gentle stretching exercises to tolerance     Kofi Silva, PTA 4/26/2021

## 2021-04-28 ENCOUNTER — HOSPITAL ENCOUNTER (OUTPATIENT)
Dept: PHYSICAL THERAPY | Age: 46
Discharge: HOME | End: 2021-04-28
Payer: COMMERCIAL

## 2021-04-28 PROCEDURE — 97110 THERAPEUTIC EXERCISES: CPT | Performed by: PHYSICAL THERAPIST

## 2021-04-28 PROCEDURE — 97140 MANUAL THERAPY 1/> REGIONS: CPT | Performed by: PHYSICAL THERAPIST

## 2021-04-28 NOTE — PROGRESS NOTES
PT DAILY TREATMENT NOTE - [facility] 2-15    Patient Name: Marco Antonio Hardin  Date:2021  : 1975  [x]  Patient  Verified  Payor: Pearl Flor / Plan: Cristian Sutton / Product Type: HMO /    In time: 4:00 PM Out time: 5:10  PM  Total Treatment Time (min):70  Total Timed Codes (min): 60  1:1 Treatment Time ( [address] only): 60   Visit #:  10     Treatment Area: Pain in left shoulder [M25.512]    SUBJECTIVE  Pain Level (0-10 scale):1-2/10  Any medication changes, allergies to medications, adverse drug reactions, diagnosis change, or new procedure performed?: [x] No    [] Yes (see summary sheet for update)  Subjective functional status/changes:   [] No changes reported   Pt states that she was able to fasten her bra yesterday \"with much less difficulty! \"    OBJECTIVE    Modality rationale: decrease inflammation and decrease pain to improve the patients ability to reach, lift, perform ADL's without pain/limitation   Min Type Additional Details       [] Estim: []Att   []Unatt    []TENS instruct                  []IFC  []Premod   []NMES                     []Other:  []w/US   []w/ice   []w/heat  Position:  Location:       []  Traction: [] Cervical       []Lumbar                       [] Prone          []Supine                       []Intermittent   []Continuous Lbs:  [] before manual  [] after manual  []w/heat    []  Ultrasound: []Continuous   [] Pulsed                       at: []1MHz   []3MHz Location:  W/cm2:    [] Paraffin         Location:   []w/heat   10 []  Ice     [x]  Heat  []  Ice massage Position: supine with LE's elevated  Location: L shoulder    []  Laser  []  Other: Position:  Location:      []  Vasopneumatic Device Pressure:       [] lo [] med [] hi   Temperature:      [] Skin assessment post-treatment:  []intact []redness- no adverse reaction    []redness - adverse reaction:     15 min Therapeutic Exercise:  [x] See flow sheet : reviewed HEP, added per flow sheet   Rationale: increase ROM, increase strength and improve coordination to improve the patients ability to reach, lift, perform ADL's without pain/limitation  Access Code: [email or web address]: [email or web address]/Date: 04/28/2021Prepared by: Andreas Arvizu RiddockExercises   Scapular Retraction with Resistance - 1 x daily - 7 x weekly - 1-2 sets - 10 reps - 3 hold   Scapular Retraction with Resistance Advanced - 1 x daily - 7 x weekly - 1-2 sets - 10 reps - 3 hold  SCM stretch    30 min Manual Therapy: STM/TrP paracervical L pec minor/major, MFR SCM, scalenes, L intercostals, PROM L shoulder to tolerance, L scap mobs   Rationale: decrease pain, increase ROM, increase tissue extensibility and decrease trigger points to improve the patients ability to reach, lift, perform ADL's without pain/limitation            With   [x] TE   [] TA   [] Neuro   [] SC   [] other: Patient Education: [x] Review HEP    [x] Progressed/Changed HEP based on: subjective and objective assessments   [] positioning   [] body mechanics   [] transfers   [] heat/ice application    [] other:      Other Objective/Functional Measures:        Pain Level (0-10 scale) post treatment: 0/10    ASSESSMENT/Changes in Function:   Pt had improved L scapular mobility. Pt exhibited TTP and tightness in B scalenes, SCM (L>R) and required v.c's for added stretching ex's for form and posture and breath   Patient will continue to benefit from skilled PT services to modify and progress therapeutic interventions, address functional mobility deficits, address ROM deficits, address strength deficits, analyze and address soft tissue restrictions, analyze and cue movement patterns, analyze and modify body mechanics/ergonomics and instruct in home and community integration to attain remaining goals.      []  See Plan of Care  []  See progress note/recertification  []  See Discharge Summary         Progress towards goals / Updated goals:       PLAN  [x]  Upgrade activities as tolerated     [x]  Continue plan of care  [] Update interventions per flow sheet       []  Discharge due to:_  []  Other:     Milo Brink, PT 4/28/2021

## 2021-05-07 ENCOUNTER — APPOINTMENT (OUTPATIENT)
Dept: INFUSION THERAPY | Age: 46
End: 2021-05-07

## 2021-05-18 NOTE — PROGRESS NOTES
Miguel Angel Leigh is a 50 y.o. female here for 3 month follow up for left breast cancer. 1. Have you been to the ER, urgent care clinic since your last visit? Hospitalized since your last visit? no    2. Have you seen or consulted any other health care providers outside of the [address] [place] [address] since your last visit? Include any pap smears or colon screening. no  She had port removed 4/6/21. Pt states she has been doing fine. No complaints. She had the Moderna vaccine but unsure of dates.

## 2021-05-20 ENCOUNTER — OFFICE VISIT (OUTPATIENT)
Dept: ONCOLOGY | Age: 46
End: 2021-05-20
Payer: COMMERCIAL

## 2021-05-20 VITALS
TEMPERATURE: 98.4 F | HEART RATE: 81 BPM | WEIGHT: 169.4 LBS | DIASTOLIC BLOOD PRESSURE: 80 MMHG | HEIGHT: 65 IN | OXYGEN SATURATION: 95 % | SYSTOLIC BLOOD PRESSURE: 119 MMHG | BODY MASS INDEX: 28.22 KG/M2

## 2021-05-20 DIAGNOSIS — Z17.1 MALIGNANT NEOPLASM OF LEFT BREAST IN FEMALE, ESTROGEN RECEPTOR NEGATIVE, UNSPECIFIED SITE OF BREAST (HCC): Primary | ICD-10-CM

## 2021-05-20 DIAGNOSIS — C50.912 MALIGNANT NEOPLASM OF LEFT BREAST IN FEMALE, ESTROGEN RECEPTOR NEGATIVE, UNSPECIFIED SITE OF BREAST (HCC): Primary | ICD-10-CM

## 2021-05-20 PROCEDURE — 99213 OFFICE O/P EST LOW 20 MIN: CPT | Performed by: INTERNAL MEDICINE

## 2021-05-20 NOTE — PROGRESS NOTES
[address] [facility], [facility] [place], [address] [place] [address]  [phone]      Oncology Progress note      Patient: Scot Cm MRN: [id number]  SSN: [id number]    YOB: 1975  Age: 50 y.o. Sex: female        Diagnosis:     1. 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 Neoadjuvant chemotherapy   Docetaxel, Carboplatin, Trastuzumab + Pertuzumab - s/p 6 cycles  2. Left breast lumpectomy 07/09/2020  2. Adjuvant chemotherapy   Trastuzumab, Pertuzumab - Completed 3/12/2021    Subjective:      Scot Cm is a 50 y.o. female who I am seeing in follow up for a diagnosis of left sided invasive breast carcinoma. She felt a lump in her left breast. Dr. Que Bowen obtained a diagnostic mammogram. The mammogram showed an abnormality in the upper quadrant of the left breast. A biopsy of the lesion revealed a ER 0% CO 0% and Her 2 3+ Gr 3 IDC of the left breast.     Ms. Derrell Hidalgo completed neoadjuvant chemotherapy and underwent left breast lumpectomy on 7/9/2020. She had a pCR. She completed adjuvant radiation in September 2020. She completed herceptin/perjeta       Review of Systems:    Constitutional: negative  Eyes: negative  Ears, Nose, Mouth, Throat, and Face: negative  Respiratory: negative  Cardiovascular: negative  Gastrointestinal: negative  Genitourinary:negative  Integument/Breast: negative  Hematologic/Lymphatic: negative  Musculoskeletal: left arm tightness when trying to raise her arm  Neurological: negative    ROS was pulled in from a previous visit. No changes were made d/t symptoms remain the same.       Past Medical History:   Diagnosis Date    Anxiety and depression     Cancer ([facility] [address] 01/2020    left breast ca er/pr (-) her 2 (+)     Past Surgical History:   Procedure Laterality Date    HX BREAST BIOPSY Left 07/24/2018    Left sebaceous cyst surgically removed    HX BREAST BIOPSY Right     HX BREAST LUMPECTOMY Left 7/9/2020    LEFT BREAST WIRE-LOCALIZED LUMPECTOMY AND LEFT BREAST SENTINEL NODE BIOPSY performed by Twila Lopez MD at [facility] AMBULATORY OR    HX CYST INCISION AND DRAINAGE Left 20+ yrs ago    HX HEENT      tonsillectomy    HX VASCULAR ACCESS Right 02/25/2020    right chest area    IR INSERT TUNL CVC W PORT OVER 5 YEARS  2/25/2020      Family History   Problem Relation Age of Onset    Hypertension Mother     Kidney Disease Mother     Thyroid Disease Mother      Social History     Tobacco Use    Smoking status: Never Smoker    Smokeless tobacco: Never Used   Substance Use Topics    Alcohol use: No      Prior to Admission medications    Medication Sig Start Date End Date Taking? Authorizing Provider   loratadine (Claritin) 10 mg tablet Take 10 mg by mouth. Yes Provider, Historical   B.infantis-B.ani-B.long-B.bifi (Probiotic 4X) 10-15 mg TbEC Take  by mouth. Yes Provider, Historical   diphenoxylate-atropine (LOMOTIL) 2.5-0.025 mg per tablet TAKE 1 TABLET BY MOUTH 4 TIMES A DAY AS NEEDED FOR DIARRHEA *MAX 4 TABS/DAY* 8/17/20  Yes Ana Cristina Congress, NP   ethinyl estradiol-etonogestrel (NuvaRing) 0.12-0.015 mg/24 hr vaginal ring by Intravaginal route. Yes Provider, Historical   VIIBRYD 40 mg tab tablet TAKE 1 TABLET BY MOUTH EVERY DAY 6/19/18  Yes Provider, Historical        No Known Allergies      Objective:     Visit Vitals  /80 (BP 1 Location: Right upper arm, BP Patient Position: Sitting)   Pulse 81   Temp 98.4 °F (36.9 °C) (Temporal)   Ht 5' 5\" (1.651 m)   Wt 169 lb 6.4 oz (76.8 kg)   SpO2 95%   BMI 28.19 kg/m²     Pain Scale: 0 - No pain/10      Physical Exam:    GENERAL: alert, cooperative, no distress, appears stated age  EYE: conjunctivae/corneas clear.   LYMPHATIC: Cervical, supraclavicular, and axillary nodes normal.   THROAT & NECK: normal and no erythema or exudates noted. LUNG: clear to auscultation bilaterally  HEART: regular rate and rhythm  ABDOMEN: soft, non-tender. Bowel sounds normal.  EXTREMITIES:  No edema  SKIN: Normal.  NEUROLOGIC: AOx3. Gait normal.    Physical exam was pulled in from a previous visit. No changes were made d/t physical exam remains the same. Assessment:     1. Left breast carcinoma: Dx: 1/30/2020  T2 N1 M0 (Stage IIA) Invasive ductal carcinoma, Tumor size 2 cm, grade 3, ER 0%, MT 0%, Her 2 3+       ypT0 ypN0 - pCR    ECOG PS 0  Intent of Treatment - curative  Prognosis: excellent     LVEF (12/29/20) - 55-60%    Completed neoadjuvant chemotherapy   Docetaxel, Carboplatin, Trastuzumab + Pertuzumab - s/p 6 cycles    S/p left breast lumpectomy and left SLNB on 7/9/2020  Achieved pCR    Adjuvant radiation on clinical trial - completed 9/2020 with Dr. Sharif Sotelo 2/12/2021 - BI-RADS Assessment Category 3: Probably benign finding. Short-interval  follow-up suggested. Left breast calcifications at the lumpectomy bed, strongly favored to be dystrophic calcifications. This is the patient's first postlumpectomy mammogram    Received adjuvant chemotherapy   Trastuzumab, Pertuzumab - completed 3/12/2021    Port removed 4/6/2021      Plan:     · Mammogram scheduled for August 24/2021  · Follow-up in 3 months    I performed a history and physical examination of the patient and discussed his management with the NPP. I reviewed the NPP note and agree with the documented findings and plan of care. The patient was seen in conjunction with Ms. Marquez. Ms. Brian Nazario is a women with history of left breast Her 2 +ve breast ca. She has completed adjuvant treatment and is on surveillance. She feels well. Her physical exam is normal.       Signed by: Dean Meyers MD                     May 31, 2021        CC. Rosanne Molina MD  CC.  Aldo De La Cruz MD

## 2021-05-20 NOTE — LETTER
5/31/2021 Patient: Karla Green YOB: 1975 Date of Visit: 5/20/2021 Ramya Bjearano MD 
[address] [facility] [address] [address] Via Fax: [phone] Dear Ramya Bejarano MD, Thank you for referring Ms. Jaylen Huerta to [address] [place] [address] for evaluation. My notes for this consultation are attached. If you have questions, please do not hesitate to call me. I look forward to following your patient along with you.  
 
 
Sincerely, 
 
Espinoza Oseguera MD

## 2021-06-07 ENCOUNTER — APPOINTMENT (OUTPATIENT)
Dept: PHYSICAL THERAPY | Age: 46
End: 2021-06-07
Payer: COMMERCIAL

## 2021-06-08 ENCOUNTER — HOSPITAL ENCOUNTER (OUTPATIENT)
Dept: PHYSICAL THERAPY | Age: 46
Discharge: HOME | End: 2021-06-08
Payer: COMMERCIAL

## 2021-06-08 PROCEDURE — 97110 THERAPEUTIC EXERCISES: CPT | Performed by: PHYSICAL THERAPIST

## 2021-06-08 PROCEDURE — 97161 PT EVAL LOW COMPLEX 20 MIN: CPT | Performed by: PHYSICAL THERAPIST

## 2021-06-08 PROCEDURE — 97140 MANUAL THERAPY 1/> REGIONS: CPT | Performed by: PHYSICAL THERAPIST

## 2021-06-08 NOTE — PROGRESS NOTES
Physical Therapy at [facility],   a part of [facility]  [address]  [place], [address]  Phone: [phone]  Fax: [phone]    Plan of Care/Statement of Necessity for Physical Therapy Services  2-15    Patient name: Patrica Wilson  : 1975  Provider#: [id number]  Referral source: Sharlene Brandon MD      Medical/Treatment Diagnosis: Pain in left shoulder [M25.512]     Prior Hospitalization: see medical history     Comorbidities: L breast cancer  Prior Level of Function: No difficulties or pain when using L shoulder  Medications: Verified on Patient Summary List    Start of Care: 2021      Onset Date: 2021       The Plan of Care and following information is based on the information from the initial evaluation. Assessment/ key information: Pt is a 50 y.o female returning to PT for post L breast cancer treatment shoulder dysfunction. Pt's chief c/o is lack of full motion and strength when using her L UE as well as pain. Pt presents with decreased L upper quadrant motion and strength, impaired posture. Patient will benefit from skilled PT services to address functional mobility deficits, address ROM deficits, address strength deficits, analyze and address soft tissue restrictions, analyze and cue movement patterns, analyze and modify body mechanics/ergonomics, assess and modify postural abnormalities and instruct in home and community integration to address rehab goals per plan of care              Evaluation Complexity History MEDIUM  Complexity : 1-2 comorbidities / personal factors will impact the outcome/ POC ; Examination MEDIUM Complexity : 3 Standardized tests and measures addressing body structure, function, activity limitation and / or participation in recreation  ;Presentation MEDIUM Complexity : Evolving with changing characteristics  ; Clinical Decision Making MEDIUM Complexity : FOTO score of 26-74  Overall Complexity Rating: MEDIUM     Problem List: pain affecting function, decrease ROM, decrease strength and decrease ADL/ functional abilitiies           Treatment Plan may include any combination of the following: Therapeutic exercise, Therapeutic activities, Neuromuscular re-education, Physical agent/modality, Manual therapy, Patient education and Functional mobility training  Patient / Family readiness to learn indicated by: asking questions, trying to perform skills and interest  Persons(s) to be included in education: patient (P)  Barriers to Learning/Limitations: None  Patient Goal (s): stop pain when I use my arm  Patient Self Reported Health Status: fair  Rehabilitation Potential: good     Short Term Goals: To be accomplished in 2 treatments:  1) Pt will verbalize understanding of infection and lymphedema risk reduction practices. 2) Pt will demonstrate [place] in initial HEP for breathing and postural exercises  3) Pt will verbalize knowledge of signs and symptoms to report to physician.     Long Term Goals: To be accomplished in 20 treatments:  1) Pt will be have decreased pain by 4-5  levels on NPS when using L UE for all ADL's involving reaching, lifting, pushing, and pulling. 2) Pt will be Independent with HEP for pain management, utilizing correct breath pattern, strengthening, and motion exercises.   3) Pt will utilize correct breath and movement patterns during all ADL's involving L UE movements at or above shoulder level  4) Pt will have increased motion in L upper quadrant by 20-30 degrees with decreased pain and improved strength by 1-2 muscle grades in order to use L UE for all ADL's involving reaching, lifting, pushing, and pulling     Frequency / Duration: Patient to be seen 1-2 times per week for 20 treatments.     Patient/ Caregiver education and instruction: activity modification, exercises and other lymphedema and infection risk reduction     [x]  Plan of care has been reviewed with TODD    360Spencer Hernandez, PT 6/8/2021 ________________________________________________________________________    I certify that the above Therapy Services are being furnished while the patient is under my care. I agree with the treatment plan and certify that this therapy is necessary.     Physician's Signature:____________________  Date:____________Time: _________      Lisha Mcginnis MD

## 2021-06-08 NOTE — PROGRESS NOTES
PT ONCOLOGY EVAL/DAILY NOTE    Patient Name: Felice Riddle  Date:2021  : 1975  [x]  Patient  Verified  Payor: Mel Willard / Plan: Marylen Hones / Product Type: HMO /    In time:9:00  Out time:10:00  Total Treatment Time (min): 60  Total Timed Codes (min): 45     Visit #: 1 of 20    Treatment Area: Pain in left shoulder [M25.512]    SUBJECTIVE    Current symptoms/Complaints: Pt reports continues to have difficulty and pain reaching behind her and reaching above shoulder level. Pt has one more week of SOL's which have been very stressful.  Pt continues to have neuropathy in hands (tingling) which increases the more she has to use hands   [] Constant []Intermittent    []Improving  [x]Staying the Same  []Getting worse    Subjective functional status:     Present Symptoms/History:  Past Medical History:   Diagnosis Date    Anxiety and depression      Cancer ([facility] [address] 2020     left breast ca er/pr (-) her 2 (+)                Past Surgical History:   Procedure Laterality Date    HX BREAST BIOPSY Left 2018     Left sebaceous cyst surgically removed    HX BREAST BIOPSY Right      HX BREAST LUMPECTOMY Left 2020     LEFT BREAST WIRE-LOCALIZED LUMPECTOMY AND LEFT BREAST SENTINEL NODE BIOPSY performed by Henri Martin MD at [facility] AMBULATORY OR    HX CYST INCISION AND DRAINAGE Left 20+ yrs ago    HX HEENT         tonsillectomy    HX VASCULAR ACCESS Right 2020     right chest area    IR INSERT TUNL CVC W PORT OVER 5 YEARS          Referring Provider: Henri Martin MD   Medical Oncologist: May 20th Dr Kerry Ko   Radiation Oncologist: Dr Elkin Logan May 22nd   Primary Care Physician: Danyelle Toscano MD  Next Appointment: sees 10/11/21 Dr Raisa Newberry, has mammo in August, sees oncologist in 6 months  Diagnosis: L shoulder pain and stiffness  Precautions/Indications: lymphedema risk  History of Present Illness:  Diagnosis:      1. Left breast carcinoma: Dx: 2020  T2 N1 M0 (Stage IIA) Invasive ductal carcinoma, Tumor size 2 cm, grade 3, ER 0%, IL 0%, Her 2 3+      ypT0 ypN0 - pCR     Treatment:      1. Neoadjuvant chemotherapy              Docetaxel, Carboplatin, Trastuzumab + Pertuzumab - s/p 6 cycles  2. Left breast lumpectomy 07/09/2020  2. Adjuvant chemotherapy              Trastuzumab, Pertuzumab -                 Has your activity changed since diagnosis? 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 N/A []?pain []?financial []?time []?transportation []? other  Motivation: high  Substance use:   [x]? N/A  []? Alcohol []? Tobacco []? other:   Cognition: A & O x 4       OBJECTIVE    Skin/Soft Tissue: Skin of L upper quadrant dry, no signs of infection, good incision site mobility  ROM:                                                                 R                     L     Scapulohumoral Control / Rhythm:     normal                   fair    Able to eccentrically lower with good control?  Left:    No         Right: Yes           Upper Extremity AROM:                                                                                      R                                 L  Shoulder Flexion                           170                               143 with end range p!                                                     Shoulder Abduction                      165                               115 with end range p!   Shoulder Extension                       15                               11     with end range p!     Shoulder ER                                  41 30   with end range p!   Shoulder IR                                   31                               21   with end range p!                                                                                                                                                        UPPER QUARTER                           MUSCLE STRENGTH  KEY                                                              R            L  0 - No Contraction        C1, C2 Neck Flex        4                                       1 - Trace                       C3 Side Flex          4           4                                                 2 - Poor                         C4 Sh Elev                     4           4                                             3 - Fair                          C5 Deltoid/Biceps  4        3+                                  4 - Good                       C6 Wrist Ext           4       4                                            5 - Normal                     C7 Triceps             4     3+                                                                                 C8 Thumb Ext        4         4                                                                                          C7 Hand Inst                  4             4                                              MMT: See above                                            R                      L  Shoulder flexion                4                      3+            Shoulder ER                      3+                   3+    Shoulder IR                       3                      3+       Neurological: Sensations: Light touch:  Intact to LT throughout L upper quadrant except diminished in L axilla                       Palpation: TTP L UT/LS, rhomboids, subscap, pec minor/major,    Posture: forward head, L IR at g-h jt, protracted scapula  Breath pattern assessment  Diaphragm: not primary; able to initiate  Anterior chest: primary   Accessory respiratory muscle use: CAMI quach        30 min Therapeutic Exercise:  [x] See flow sheet :   Rationale: increase ROM, increase strength and improve coordination to improve the patients ability to perform ADL's required for return to work and/or community       15 min Manual Therapy:  PROM L shoulder all planes, Gr 1 g-h jt inf mobs and post, TrP pec minor, subscap   Rationale: decrease pain, increase ROM, increase tissue extensibility, decrease trigger points and increase postural awareness to effectively use extremity during functional tasks (reaching, grooming, dressing, walking)           With   [] TE   [] TA   [] neuro   [] other: Patient Education: [] Review HEP    [] Progressed/Changed HEP based on:   [x] positioning   [x] body mechanics   [] transfers   [] heat/ice application    [x] other: posture     Other Objective/Functional Measures:       Pain Level (0-10 scale) post treatment: 3-4 when elevating L UE    ASSESSMENT/Changes in Function: Pt is a 50 y.o female returning to PT for post L breast cancer treatment shoulder dysfunction. Pt's chief c/o is lack of full motion and strength when using her L UE as well as pain. Pt presents with decreased L upper quadrant motion and strength, impaired posture.  Patient will benefit from skilled PT services to address functional mobility deficits, address ROM deficits, address strength deficits, analyze and address soft tissue restrictions, analyze and cue movement patterns, analyze and modify body mechanics/ergonomics, assess and modify postural abnormalities and instruct in home and community integration to address rehab goals per plan of care          Goals:  See Plan of Care    PLAN  []  Upgrade activities as tolerated     [x]  Continue plan of care  []  Update interventions per flow sheet       []  Discharge due to:_  []  Other:_      Neisha León, PT 6/8/2021  9:08 AM

## 2021-06-09 ENCOUNTER — APPOINTMENT (OUTPATIENT)
Dept: PHYSICAL THERAPY | Age: 46
End: 2021-06-09
Payer: COMMERCIAL

## 2021-06-15 ENCOUNTER — APPOINTMENT (OUTPATIENT)
Dept: PHYSICAL THERAPY | Age: 46
End: 2021-06-15
Payer: COMMERCIAL

## 2021-06-16 ENCOUNTER — HOSPITAL ENCOUNTER (OUTPATIENT)
Dept: PHYSICAL THERAPY | Age: 46
Discharge: HOME | End: 2021-06-16
Payer: COMMERCIAL

## 2021-06-16 PROCEDURE — 97140 MANUAL THERAPY 1/> REGIONS: CPT | Performed by: PHYSICAL THERAPY ASSISTANT

## 2021-06-16 NOTE — PROGRESS NOTES
PT DAILY TREATMENT NOTE - [facility] 2-15    Patient Name: Delmar Edmonds  Date:2021  : 1975  [x]  Patient  Verified  Payor: Mellisa Mendoza / Plan: Lissy Marin / Product Type: HMO /    In time:3:35 PM  Out time:4:15 PM  Total Treatment Time (min): 40  Total Timed Codes (min): 30  1:1 Treatment Time ( [address] only): 30   Visit #:  12    Treatment Area: Pain in left shoulder [M25.512]    SUBJECTIVE  Pain Level (0-10 scale): 3-4/10  Any medication changes, allergies to medications, adverse drug reactions, diagnosis change, or new procedure performed?: [x] No    [] Yes (see summary sheet for update)  Subjective functional status/changes:   [] No changes reported  Patient reports compliance with her HEP and that she will do her neck stretches periodically throughout the day. She reports being very stressed right now and feels she has more tightness in her neck and L shoulder. States she did a lot of lifting chairs yesterday for a school event and feels this has aggravated her shoulder as well.       OBJECTIVE    Modality rationale: decrease pain, increase tissue extensibility and increase muscle contraction/control to improve the patients ability to perform ADL's required for return to work and/or community   Min Type Additional Details       [] Estim: []Att   []Unatt    []TENS instruct                  []IFC  []Premod   []NMES                     []Other:  []w/US   []w/ice   []w/heat  Position:  Location:       []  Traction: [] Cervical       []Lumbar                       [] Prone          []Supine                       []Intermittent   []Continuous Lbs:  [] before manual  [] after manual  []w/heat    []  Ultrasound: []Continuous   [] Pulsed                       at: []1MHz   []3MHz Location:  W/cm2:    [] Paraffin         Location:   []w/heat   10 []  Ice     [x]  Heat-post  []  Ice massage Position: supine with LE's elevated  Location: neck and L shoulder    []  Laser  []  Other: Position:  Location:      [] Vasopneumatic Device Pressure:       [] lo [] med [] hi   Temperature:      [x] Skin assessment post-treatment:  [x]intact []redness- no adverse reaction    []redness - adverse reaction:     5 min Therapeutic Exercise:  [x] See flow sheet :   Rationale: increase ROM, increase strength and improve coordination to improve the patients ability to ADL's required for return to work and/or community      25 min Manual Therapy:  L scapula mobs, PROM L shoulder to tolerance, TPR and muscle rolling B UT, Suboccipital release x 3   Rationale: decrease pain, increase ROM, increase tissue extensibility, decrease trigger points and increase postural awareness to improve the patients ability to to effectively use extremity during functional tasks (reaching, grooming, dressing, walking)            With   [x] TE   [] TA   [] Neuro   [] SC   [] other: Patient Education: [x] Review HEP    [] Progressed/Changed HEP based on:   [] positioning   [] body mechanics   [] transfers   [x] heat/ice application    [x] other: educated patient on relaxation techniques, diaphragm breathing, setting a timer to perform her ROM/stretches throughout the day     Other Objective/Functional Measures: NT     Pain Level (0-10 scale) post treatment: \"not as tight     ASSESSMENT/Changes in Function:   Patient became tearful today due to increased pain reporting she has a lot of stress in her life. Focused on manual therapy due to higher pain levels and decreased tolerance to exercises. Patient will continue to benefit from skilled PT services to modify and progress therapeutic interventions, address functional mobility deficits, address ROM deficits, address strength deficits, analyze and address soft tissue restrictions, analyze and cue movement patterns, analyze and modify body mechanics/ergonomics, assess and modify postural abnormalities and instruct in home and community integration to attain remaining goals.      []  See Plan of Care  []  See progress note/recertification  []  See Discharge Summary         Progress towards goals / Updated goals:  NT    PLAN  []  Upgrade activities as tolerated     [x]  Continue plan of care  []  Update interventions per flow sheet       []  Discharge due to:_  []  Other:_      Tasneem Spivey, TODD 6/16/2021

## 2021-06-21 ENCOUNTER — HOSPITAL ENCOUNTER (OUTPATIENT)
Dept: PHYSICAL THERAPY | Age: 46
Discharge: HOME | End: 2021-06-21
Payer: COMMERCIAL

## 2021-06-21 PROCEDURE — 97140 MANUAL THERAPY 1/> REGIONS: CPT | Performed by: PHYSICAL THERAPY ASSISTANT

## 2021-06-21 PROCEDURE — 97110 THERAPEUTIC EXERCISES: CPT | Performed by: PHYSICAL THERAPY ASSISTANT

## 2021-06-21 NOTE — PROGRESS NOTES
PT DAILY TREATMENT NOTE - [facility] 2-15    Patient Name: Cody Calvillo  Date:2021  : 1975  [x]  Patient  Verified  Payor: Deena Joneser / Plan: Libra Abbasi / Product Type: HMO /    In time: 4:20 PM  Out time:5:30 PM  Total Treatment Time (min): 65  Total Timed Codes (min):55   1:1 Treatment Time ( [address] only): 54  Visit #:  13    Treatment Area: Pain in left shoulder [M25.512]    SUBJECTIVE  Pain Level (0-10 scale): 0/10  Any medication changes, allergies to medications, adverse drug reactions, diagnosis change, or new procedure performed?: [x] No    [] Yes (see summary sheet for update)  Subjective functional status/changes:   [] No changes reported  Patient reports she is doing much better compared to previous session. States her neck is still tight but her shoulder is not painful today. States she was able to don/doff a sports bra over the weekend \"I haven't been able to do that in forever! \"    OBJECTIVE    Modality rationale: decrease pain, increase tissue extensibility and increase muscle contraction/control to improve the patients ability to perform ADL's required for return to work and/or community   Min Type Additional Details       [] Estim: []Att   []Unatt    []TENS instruct                  []IFC  []Premod   []NMES                     []Other:  []w/US   []w/ice   []w/heat  Position:  Location:       []  Traction: [] Cervical       []Lumbar                       [] Prone          []Supine                       []Intermittent   []Continuous Lbs:  [] before manual  [] after manual  []w/heat    []  Ultrasound: []Continuous   [] Pulsed                       at: []1MHz   []3MHz Location:  W/cm2:    [] Paraffin         Location:   []w/heat   10 []  Ice     [x]  Heat-post  []  Ice massage Position: supine with LE's elevated  Location: neck and L shoulder    []  Laser  []  Other: Position:  Location:      []  Vasopneumatic Device Pressure:       [] lo [] med [] hi   Temperature:      [x] Skin assessment post-treatment:  [x]intact []redness- no adverse reaction    []redness - adverse reaction:     30 min Therapeutic Exercise:  [x] See flow sheet :   Rationale: increase ROM, increase strength and improve coordination to improve the patients ability to ADL's required for return to work and/or community      25 min Manual Therapy:   PROM L shoulder to tolerance, TPR and muscle rolling B UT   Rationale: decrease pain, increase ROM, increase tissue extensibility, decrease trigger points and increase postural awareness to improve the patients ability to to effectively use extremity during functional tasks (reaching, grooming, dressing, walking)            With   [x] TE   [] TA   [] Neuro   [] SC   [] other: Patient Education: [x] Review HEP    [] Progressed/Changed HEP based on:   [] positioning   [] body mechanics   [] transfers   [x] heat/ice application    [x] other: educated patient on relaxation techniques, diaphragm breathing, setting a timer to perform her ROM/stretches throughout the day     Other Objective/Functional Measures: NT     Pain Level (0-10 scale) post treatment: 0/10    ASSESSMENT/Changes in Function:   Better tolerance to exercises today. ROM improving. Patient will continue to benefit from skilled PT services to modify and progress therapeutic interventions, address functional mobility deficits, address ROM deficits, address strength deficits, analyze and address soft tissue restrictions, analyze and cue movement patterns, analyze and modify body mechanics/ergonomics, assess and modify postural abnormalities and instruct in home and community integration to attain remaining goals.      []  See Plan of Care  []  See progress note/recertification  []  See Discharge Summary         Progress towards goals / Updated goals:  NT    PLAN  []  Upgrade activities as tolerated     [x]  Continue plan of care  []  Update interventions per flow sheet       []  Discharge due to:_  []  Other:_ Edgardo Salcedo, PTA 6/21/2021

## 2021-06-23 ENCOUNTER — HOSPITAL ENCOUNTER (OUTPATIENT)
Dept: PHYSICAL THERAPY | Age: 46
Discharge: HOME | End: 2021-06-23
Payer: COMMERCIAL

## 2021-06-23 PROCEDURE — 97110 THERAPEUTIC EXERCISES: CPT | Performed by: PHYSICAL THERAPY ASSISTANT

## 2021-06-23 PROCEDURE — 97140 MANUAL THERAPY 1/> REGIONS: CPT | Performed by: PHYSICAL THERAPY ASSISTANT

## 2021-06-23 NOTE — PROGRESS NOTES
PT DAILY TREATMENT NOTE - [facility] 2-15    Patient Name: David West  Date:2021  : 1975  [x]  Patient  Verified  Payor: Matthew Sams / Plan: Fritz Acharya / Product Type: HMO /    In time: 4:20 PM  Out time:5:20 PM  Total Treatment Time (min): 60  Total Timed Codes (min):50   1:1 Treatment Time ([facility] only): 54  Visit #:  14    Treatment Area: Pain in left shoulder [M25.512]    SUBJECTIVE  Pain Level (0-10 scale): 0/10  Any medication changes, allergies to medications, adverse drug reactions, diagnosis change, or new procedure performed?: [x] No    [] Yes (see summary sheet for update)  Subjective functional status/changes:   [] No changes reported  Patient reports \"I've been really mindful of my posture, I don't know why my neck is so tight. \"    OBJECTIVE    Modality rationale: decrease pain, increase tissue extensibility and increase muscle contraction/control to improve the patients ability to perform ADL's required for return to work and/or community   Min Type Additional Details       [] Estim: []Att   []Unatt    []TENS instruct                  []IFC  []Premod   []NMES                     []Other:  []w/US   []w/ice   []w/heat  Position:  Location:       []  Traction: [] Cervical       []Lumbar                       [] Prone          []Supine                       []Intermittent   []Continuous Lbs:  [] before manual  [] after manual  []w/heat    []  Ultrasound: []Continuous   [] Pulsed                       at: []1MHz   []3MHz Location:  W/cm2:    [] Paraffin         Location:   []w/heat   10 []  Ice     [x]  Heat-post  []  Ice massage Position: supine with LE's elevated  Location: neck and L shoulder    []  Laser  []  Other: Position:  Location:      []  Vasopneumatic Device Pressure:       [] lo [] med [] hi   Temperature:      [x] Skin assessment post-treatment:  [x]intact []redness- no adverse reaction    []redness - adverse reaction:     25 min Therapeutic Exercise:  [x] See flow sheet : Rationale: increase ROM, increase strength and improve coordination to improve the patients ability to ADL's required for return to work and/or community      25 min Manual Therapy:   PROM L shoulder to tolerance, TPR and muscle rolling B UT, SOR x 3, gentle cervical distraction, s/l scapula mobs    Rationale: decrease pain, increase ROM, increase tissue extensibility, decrease trigger points and increase postural awareness to improve the patients ability to to effectively use extremity during functional tasks (reaching, grooming, dressing, walking)            With   [x] TE   [] TA   [] Neuro   [] SC   [] other: Patient Education: [x] Review HEP    [] Progressed/Changed HEP based on:   [] positioning   [] body mechanics   [] transfers   [x] heat/ice application    [x] other: educated patient on relaxation techniques, diaphragm breathing, setting a timer to perform her ROM/stretches throughout the day     Other Objective/Functional Measures: NT     Pain Level (0-10 scale) post treatment: 0/10    ASSESSMENT/Changes in Function:   Improved R shoulder flexion and ER ROM following manual stretching. Decreased pain with PROM ER compared to previous visit. Increased turgor noted L>R UT. Patient will continue to benefit from skilled PT services to modify and progress therapeutic interventions, address functional mobility deficits, address ROM deficits, address strength deficits, analyze and address soft tissue restrictions, analyze and cue movement patterns, analyze and modify body mechanics/ergonomics, assess and modify postural abnormalities and instruct in home and community integration to attain remaining goals.      []  See Plan of Care  []  See progress note/recertification  []  See Discharge Summary         Progress towards goals / Updated goals:  NT    PLAN  []  Upgrade activities as tolerated     [x]  Continue plan of care  []  Update interventions per flow sheet       []  Discharge due to:_  []  Other:_ Edgardo Salcedo, PTA 6/23/2021

## 2021-06-30 ENCOUNTER — APPOINTMENT (OUTPATIENT)
Dept: PHYSICAL THERAPY | Age: 46
End: 2021-06-30
Payer: COMMERCIAL

## 2021-07-02 ENCOUNTER — HOSPITAL ENCOUNTER (OUTPATIENT)
Dept: PHYSICAL THERAPY | Age: 46
Discharge: HOME | End: 2021-07-02
Payer: COMMERCIAL

## 2021-07-02 PROCEDURE — 97110 THERAPEUTIC EXERCISES: CPT | Performed by: PHYSICAL THERAPY ASSISTANT

## 2021-07-02 PROCEDURE — 97140 MANUAL THERAPY 1/> REGIONS: CPT | Performed by: PHYSICAL THERAPY ASSISTANT

## 2021-07-02 NOTE — PROGRESS NOTES
PT DAILY TREATMENT NOTE - [facility] 2-15    Patient Name: Vicente Tamez  Date:2021  : 1975  [x]  Patient  Verified  Payor: Rhett Moreno / Plan: Emory Rouse / Product Type: HMO /    In time: 7:45 AM  Out time: 8:35 AM  Total Treatment Time (min): 55  Total Timed Codes (min):45   1:1 Treatment Time ( [address] only): 39  Visit #:  15    Treatment Area: Pain in left shoulder [M25.512]    SUBJECTIVE  Pain Level (0-10 scale): 0/10  Any medication changes, allergies to medications, adverse drug reactions, diagnosis change, or new procedure performed?: [x] No    [] Yes (see summary sheet for update)  Subjective functional status/changes:   [] No changes reported  Patient reports she is able to perform ADL's without limitations, \"I can tell it's not the same as the right when I am lying down and rest my arms over head but otherwise it feels normal.\" States she also had a massage on Tuesday, the massage therapist did MFR along her L shoulder \"She wanted me to tell you that. \"    OBJECTIVE    Modality rationale: decrease pain, increase tissue extensibility and increase muscle contraction/control to improve the patients ability to perform ADL's required for return to work and/or community   Min Type Additional Details       [] Estim: []Att   []Unatt    []TENS instruct                  []IFC  []Premod   []NMES                     []Other:  []w/US   []w/ice   []w/heat  Position:  Location:       []  Traction: [] Cervical       []Lumbar                       [] Prone          []Supine                       []Intermittent   []Continuous Lbs:  [] before manual  [] after manual  []w/heat    []  Ultrasound: []Continuous   [] Pulsed                       at: []1MHz   []3MHz Location:  W/cm2:    [] Paraffin         Location:   []w/heat   10 []  Ice     [x]  Heat-post  []  Ice massage Position: supine with LE's elevated  Location: neck and L shoulder    []  Laser  []  Other: Position:  Location:      []  Vasopneumatic Device Pressure:       [] lo [] med [] hi   Temperature:      [x] Skin assessment post-treatment:  [x]intact []redness- no adverse reaction    []redness - adverse reaction:     20 min Therapeutic Exercise:  [x] See flow sheet :   Rationale: increase ROM, increase strength and improve coordination to improve the patients ability to ADL's required for return to work and/or community      25 min Manual Therapy:   PROM L shoulder to tolerance, grade II-III posterior and inferior GH jt mobs, subscapularis release    Rationale: decrease pain, increase ROM, increase tissue extensibility, decrease trigger points and increase postural awareness to improve the patients ability to to effectively use extremity during functional tasks (reaching, grooming, dressing, walking)            With   [x] TE   [] TA   [] Neuro   [] SC   [] other: Patient Education: [x] Review HEP    [] Progressed/Changed HEP based on:   [] positioning   [] body mechanics   [] transfers   [x] heat/ice application    [x] other:      Other Objective/Functional Measures: NT     Pain Level (0-10 scale) post treatment: 0/10    ASSESSMENT/Changes in Function:   Improved flexion PROM following subscapularis release. Gave updated HEP as patient would like to try on her own for 2 weeks. Patient will continue to benefit from skilled PT services to modify and progress therapeutic interventions, address functional mobility deficits, address ROM deficits, address strength deficits, analyze and address soft tissue restrictions, analyze and cue movement patterns, analyze and modify body mechanics/ergonomics, assess and modify postural abnormalities and instruct in home and community integration to attain remaining goals.      []  See Plan of Care  []  See progress note/recertification  []  See Discharge Summary         Progress towards goals / Updated goals:  NT    PLAN  []  Upgrade activities as tolerated     [x]  Continue plan of care  []  Update interventions per flow sheet       []  Discharge due to:_  []  Other:_      López Tinoco, PTA 7/2/2021

## 2021-07-20 ENCOUNTER — APPOINTMENT (OUTPATIENT)
Dept: PHYSICAL THERAPY | Age: 46
End: 2021-07-20
Payer: COMMERCIAL

## 2021-08-02 ENCOUNTER — APPOINTMENT (OUTPATIENT)
Dept: PHYSICAL THERAPY | Age: 46
End: 2021-08-02

## 2021-08-04 ENCOUNTER — APPOINTMENT (OUTPATIENT)
Dept: PHYSICAL THERAPY | Age: 46
End: 2021-08-04

## 2021-08-24 ENCOUNTER — HOSPITAL ENCOUNTER (OUTPATIENT)
Dept: MAMMOGRAPHY | Age: 46
Discharge: HOME | End: 2021-08-24
Attending: NURSE PRACTITIONER
Payer: COMMERCIAL

## 2021-08-24 DIAGNOSIS — R92.8 ABNORMAL FINDING ON BREAST IMAGING: ICD-10-CM

## 2021-08-24 PROCEDURE — 77061 BREAST TOMOSYNTHESIS UNI: CPT

## 2021-10-11 ENCOUNTER — OFFICE VISIT (OUTPATIENT)
Dept: SURGERY | Age: 46
End: 2021-10-11
Payer: COMMERCIAL

## 2021-10-11 VITALS
DIASTOLIC BLOOD PRESSURE: 71 MMHG | HEART RATE: 94 BPM | WEIGHT: 169 LBS | SYSTOLIC BLOOD PRESSURE: 110 MMHG | BODY MASS INDEX: 28.12 KG/M2

## 2021-10-11 DIAGNOSIS — Z85.3 HISTORY OF BREAST CANCER IN FEMALE: ICD-10-CM

## 2021-10-11 DIAGNOSIS — C50.412 MALIGNANT NEOPLASM OF UPPER-OUTER QUADRANT OF LEFT FEMALE BREAST, UNSPECIFIED ESTROGEN RECEPTOR STATUS (HCC): Primary | ICD-10-CM

## 2021-10-11 DIAGNOSIS — Z92.3 S/P RADIATION THERAPY: ICD-10-CM

## 2021-10-11 DIAGNOSIS — Z98.890 S/P LUMPECTOMY OF BREAST: ICD-10-CM

## 2021-10-11 PROCEDURE — 99213 OFFICE O/P EST LOW 20 MIN: CPT | Performed by: NURSE PRACTITIONER

## 2021-10-11 NOTE — PROGRESS NOTES
HISTORY OF PRESENT ILLNESS  General Mena Flores is a 48 y.o. female. HPI ESTABLISHED patient presents for follow-up to LEFT breast cancer.  Denies breast mass, skin changes, nipple discharge and pain.        Breast cancer-  Referring - Dr. 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 Showed benign fibroid uterus, O/w clear  2/24/2020 - RIGHT breast MRI guided biopsy. Benign. 2/25/2020 - port insertion in IR  2/28/2020 - started neoadjuvant TCH-P - Dr. Brayton Leventhal  7/9/2020 - LEFT breast lumpectomy and LEFT SLNB. PCR. - Dr. Edgadro West  9/2020 - completed XRT - Dr. Belle Marino  3/2021 -finish HP - Dr. Brayton Leventhal  4/6/21 - port removal - Dr. Edgardo West     No family history of breast or ovarian cancer. The patient is genetic test negative [facility] 2/2020). OB History    No obstetric history on file. Obstetric Comments   Menarche:  13. LMP: 7/8/20  # of Children:  4. Age at Delivery of First Child:  30.   Hysterectomy/oophorectomy:  YES/NO. Breast Bx:  ? Miguel Angelnchie Bevels Hx of Breast Feeding:  yes. BCP:  yes.  Hormone therapy:  no.                    Past Surgical History:   Procedure Laterality Date    HX BREAST BIOPSY Left 07/24/2018    Left sebaceous cyst surgically removed    HX BREAST BIOPSY Right     HX BREAST LUMPECTOMY Left 7/9/2020    LEFT BREAST WIRE-LOCALIZED LUMPECTOMY AND LEFT BREAST SENTINEL NODE BIOPSY performed [facility] Sonya Enriquez [facility] AM[facility]    HX CYST INCISION AND DRAINAGE Left 20+ yrs ago    HX HEENT      tonsillectomy    HX VASCULAR ACCESS Right 02/25/2020    right chest area    IR INSERT TUNL CVC W PORT OVER 5 YEARS  2/25/2020         BENJAMIN Results (most recent):  Results from [place] encounter on 08/24/21    BENJAMIN 3D SANDHYA W MAMMO LT DX INCL CAD    Narrative  STUDY:  Left Diagnostic Digital Mammography including 3-D Tomosynthesis    INDICATION:  Left lumpectomy for carcinoma in 2020, short-term follow-up of left  breast microcalcifications in the region of the lumpectomy bed    COMPARISON:  Prior studies dating back to 2013    BREAST COMPOSITION: The breasts are heterogeneously dense, which may obscure  small masses. FINDINGS: Left digital diagnostic mammography was performed, and is interpreted  in conjunction with a computer assisted detection (CAD) system. In addition to  standard 2-D left CC and MLO views, left 3-D/tomosynthesis was performed in CC  and MLO projections. Additionally, magnification views were performed of the  left breast.    Lumpectomy changes are noted in the left breast. Magnification views of the  lumpectomy bed in the posterior third upper outer quadrant demonstrate no  suspicious microcalcifications. A linear distribution of 4 linear  microcalcifications are noted, not significantly changed, likely representing  vascular or dystrophic microcalcifications. No new masses or suspicious  calcifications are identified within either breast.    Impression  1. BI-RADS Assessment Category 3: Probably benign finding. Short-interval  follow-up suggested. Lumpectomy changes in the left breast are stable. A few  benign-appearing microcalcifications in close proximity to the lumpectomy bed  have been stable. 2. Continued follow-up is recommended, with six-month bilateral diagnostic  mammography, to include magnification views, in order to resume annual follow-up  per lumpectomy protocol. The patient has been notified of these results and recommendations. ROS    Physical Exam  Constitutional:       Appearance: Normal appearance. Chest:      Breasts:         Right: No mass, nipple discharge, skin change or tenderness. Left: No mass, nipple discharge, skin change (well healed surgical incisions and mild post treatment changes) or tenderness.    Musculoskeletal:      Comments: FROM RIGHT UE  LEFT US - almost FROM, but cannot lay arm flat above her head   Lymphadenopathy:      Upper Body:      Right upper body: No supraclavicular or axillary adenopathy. Left upper body: No supraclavicular or axillary adenopathy. Neurological:      Mental Status: She is alert. Psychiatric:         Attention and Perception: Attention normal.         Mood and Affect: Mood normal.         Speech: Speech normal.         Behavior: Behavior normal.         Visit Vitals  /71 (BP 1 Location: Right arm, BP Patient Position: Sitting, BP Cuff Size: Adult)   Pulse 94   Wt 169 lb (76.7 kg)   BMI 28.12 kg/m²         ASSESSMENT and PLAN  Encounter Diagnoses   Name Primary?  Malignant neoplasm of upper-outer quadrant of left female breast, unspecified estrogen receptor status ([facility] [address] Yes    S/P lumpectomy of breast     S/P radiation therapy     History of breast cancer in female         Normal exam with no evidence of local recurrence. LEFT axilla - tight - continues to do exercises at home. Has seen PT in the past.  Will call PRN for an appointment with Nubia Mario. Has follow-up with Dr. Cristel Weiss. Currently has Nuvaring. Discussing HRT/testerone replacement based on lab results with GYN. Discussed risks to topical and oral hormone treatments. BDmammogram 3D in 2/2022. RTC in 6 months or sooner PRN. She is comfortable with this plan. All questions answered and she stated understanding.           Total time spent for this patient - 20 minutes.

## 2021-10-11 NOTE — PATIENT INSTRUCTIONS

## 2022-01-20 ENCOUNTER — TRANSCRIBE ORDER (OUTPATIENT)
Dept: SCHEDULING | Age: 47
End: 2022-01-20

## 2022-01-20 DIAGNOSIS — Z78.0 ASYMPTOMATIC MENOPAUSAL STATE: Primary | ICD-10-CM

## 2022-01-25 ENCOUNTER — TRANSCRIBE ORDER (OUTPATIENT)
Dept: SCHEDULING | Age: 47
End: 2022-01-25

## 2022-01-25 DIAGNOSIS — Z85.3 PERSONAL HISTORY OF MALIGNANT NEOPLASM OF BREAST: Primary | ICD-10-CM

## 2022-01-31 ENCOUNTER — TRANSCRIBE ORDER (OUTPATIENT)
Dept: SCHEDULING | Age: 47
End: 2022-01-31

## 2022-01-31 DIAGNOSIS — Z85.3 PERSONAL HISTORY OF MALIGNANT NEOPLASM OF BREAST: Primary | ICD-10-CM

## 2022-02-01 ENCOUNTER — HOSPITAL ENCOUNTER (OUTPATIENT)
Dept: MAMMOGRAPHY | Age: 47
Discharge: HOME | End: 2022-02-01
Attending: FAMILY MEDICINE
Payer: COMMERCIAL

## 2022-02-01 ENCOUNTER — APPOINTMENT (OUTPATIENT)
Dept: MAMMOGRAPHY | Age: 47
End: 2022-02-01
Attending: FAMILY MEDICINE
Payer: COMMERCIAL

## 2022-02-01 ENCOUNTER — TRANSCRIBE ORDER (OUTPATIENT)
Dept: SCHEDULING | Age: 47
End: 2022-02-01

## 2022-02-01 DIAGNOSIS — Z78.0 ASYMPTOMATIC MENOPAUSAL STATE: ICD-10-CM

## 2022-02-01 DIAGNOSIS — Z12.31 ENCOUNTER FOR SCREENING MAMMOGRAM FOR MALIGNANT NEOPLASM OF BREAST: Primary | ICD-10-CM

## 2022-02-01 PROCEDURE — 77080 DXA BONE DENSITY AXIAL: CPT

## 2022-02-04 ENCOUNTER — HOSPITAL ENCOUNTER (OUTPATIENT)
Dept: MAMMOGRAPHY | Age: 47
Discharge: HOME | End: 2022-02-04
Attending: FAMILY MEDICINE
Payer: COMMERCIAL

## 2022-02-04 ENCOUNTER — HOSPITAL ENCOUNTER (OUTPATIENT)
Dept: ULTRASOUND IMAGING | Age: 47
Discharge: HOME | End: 2022-02-04
Attending: FAMILY MEDICINE
Payer: COMMERCIAL

## 2022-02-04 DIAGNOSIS — Z85.3 PERSONAL HISTORY OF MALIGNANT NEOPLASM OF BREAST: ICD-10-CM

## 2022-02-04 PROCEDURE — 76642 ULTRASOUND BREAST LIMITED: CPT

## 2022-02-04 PROCEDURE — 77062 BREAST TOMOSYNTHESIS BI: CPT

## 2022-03-20 PROBLEM — Z17.1 MALIGNANT NEOPLASM OF LEFT BREAST IN FEMALE, ESTROGEN RECEPTOR NEGATIVE (HCC): Status: ACTIVE | Noted: 2020-02-18

## 2022-03-20 PROBLEM — C50.912 MALIGNANT NEOPLASM OF LEFT BREAST IN FEMALE, ESTROGEN RECEPTOR NEGATIVE (HCC): Status: ACTIVE | Noted: 2020-02-18

## 2022-04-11 ENCOUNTER — OFFICE VISIT (OUTPATIENT)
Dept: SURGERY | Age: 47
End: 2022-04-11
Payer: COMMERCIAL

## 2022-04-11 VITALS
HEART RATE: 71 BPM | DIASTOLIC BLOOD PRESSURE: 67 MMHG | BODY MASS INDEX: 28.16 KG/M2 | SYSTOLIC BLOOD PRESSURE: 109 MMHG | WEIGHT: 169 LBS | HEIGHT: 65 IN

## 2022-04-11 DIAGNOSIS — Z98.890 S/P LUMPECTOMY OF BREAST: ICD-10-CM

## 2022-04-11 DIAGNOSIS — R92.8 ABNORMAL FINDING ON BREAST IMAGING: ICD-10-CM

## 2022-04-11 DIAGNOSIS — Z85.3 HISTORY OF BREAST CANCER IN FEMALE: ICD-10-CM

## 2022-04-11 DIAGNOSIS — C50.412 MALIGNANT NEOPLASM OF UPPER-OUTER QUADRANT OF LEFT FEMALE BREAST, UNSPECIFIED ESTROGEN RECEPTOR STATUS (HCC): Primary | ICD-10-CM

## 2022-04-11 DIAGNOSIS — Z92.3 S/P RADIATION THERAPY: ICD-10-CM

## 2022-04-11 PROCEDURE — 99213 OFFICE O/P EST LOW 20 MIN: CPT | Performed by: NURSE PRACTITIONER

## 2022-04-11 RX ORDER — FEXOFENADINE HCL AND PSEUDOEPHEDRINE HCI 180; 240 MG/1; MG/1
1 TABLET, EXTENDED RELEASE ORAL DAILY
COMMUNITY

## 2022-04-11 RX ORDER — BISMUTH SUBSALICYLATE 262 MG
1 TABLET,CHEWABLE ORAL DAILY
COMMUNITY

## 2022-04-11 RX ORDER — HYDROXYZINE PAMOATE 25 MG/1
CAPSULE ORAL
COMMUNITY
Start: 2022-02-23

## 2022-04-11 RX ORDER — CALCIUM CARB/VITAMIN D3/VIT K1 650MG-12.5
TABLET,CHEWABLE ORAL
COMMUNITY

## 2022-04-11 NOTE — PATIENT INSTRUCTIONS

## 2022-04-11 NOTE — PROGRESS NOTES
HISTORY OF PRESENT ILLNESS  Navdeep Johnson is a 48 y.o. female. HPI ESTABLISHED patient presents for follow-up to LEFT breast cancer.  Denies breast mass, skin changes, nipple discharge and pain.          Breast cancer-  Referring - Dr. Rosa Mclaughlin  1/31/2020 - T2 N1 M0 (Stage IIA) Invasive ductal carcinoma, Tumor size 2 cm, grade 3, ER 0%, TX 0%, Her 2 3+  2/7/2020 - bone scans and CT scans. Showed benign fibroid uterus, O/w clear  2/24/2020 - RIGHT breast MRI guided biopsy. Benign. 2/25/2020 - port insertion in IR  2/28/2020 - started neoadjuvant TCH-P - Dr. Jennifer Logan  7/9/2020 - LEFT breast lumpectomy and LEFT SLNB. PCR. - Dr. Mary Alcala  9/2020 - completed XRT - Dr. Reji Medley  3/2021 -finish HP - Dr. Jennifer Logan  4/6/21 - port removal - Dr. Mary Alcala       No family history of breast or ovarian cancer. The patient is genetic test negative [facility] Breast Next 2/2020). OB History    No obstetric history on file. Obstetric Comments   Menarche:  13. LMP: 7/8/20  # of Children:  4. Age at Delivery of First Child:  30.   Hysterectomy/oophorectomy:  YES/NO. Breast Bx:  ? Kiersten Ahumada Hx of Breast Feeding:  yes. BCP:  yes.  Hormone therapy:  no.                  Past Surgical History:   Procedure Laterality Date    HX BREAST BIOPSY Left 07/24/2018    Left sebaceous cyst surgically removed    HX BREAST BIOPSY Right     HX BREAST LUMPECTOMY Left 7/9/2020    LEFT BREAST WIRE-LOCALIZED LUMPECTOMY AND LEFT BREAST SENTINEL NODE BIOPSY performed by Vahe Arriola MD at [facility]"" AMBULATORY OR    HX CYST INCISION AND DRAINAGE Left 20+ yrs ago    HX HEENT      tonsillectomy    HX VASCULAR ACCESS Right 02/25/2020    right chest area    IR INSERT TUNL CVC W PORT OVER 5 YEARS  2/25/2020       [facility] Results (most recent):  Results from [place] encounter on 02/04/22    [facility] 3D SANDHYA W MAMMO BI DX INCL CAD    Narrative  STUDY:  Bilateral Diagnostic Digital Mammography with tomosynthesis and left  breast ultrasound    INDICATION: Left lumpectomy for carcinoma in 2020. Surgical pathology  demonstrated a fibrotic tumor bed with no residual carcinoma. COMPARISON:  [id number]    BREAST COMPOSITION: The breast tissue is heterogeneously dense, which could  obscure detection of small masses (approximately 51-75% glandular). FINDINGS: Bilateral digital diagnostic mammography with tomosynthesis was  performed, and is interpreted in conjunction with a computer assisted detection  (CAD) system. Lumpectomy changes are noted in the left breast upper outer  quadrant. There are few associated unchanged linear calcifications which may be  vascular or dystrophic in nature. No new masses or suspicious calcifications are  identified. In the right breast there are no suspicious masses or  microcalcifications. Ultrasound: The palpable area of concern, which was outside of the field of view of the  mammogram was scanned by the technologist and physician. The left  supraclavicular area was scanned. No suspicious solid or cystic lesion is seen. No enlarged lymph nodes visualized. Impression  1. BI-RADS Assessment Category 3: Probably benign finding. Short-interval  follow-up suggested. Stable left breast calcifications. 2.  No suspicious finding in the left supraclavicular area of clinical concern. Recommendation:  Left diagnostic mammography in 6 months. The patient has been notified of these results and recommendations. ROS    Physical Exam  Constitutional:       Appearance: Normal appearance. Chest:   Breasts:      Right: No mass, nipple discharge, skin change, tenderness, axillary adenopathy or supraclavicular adenopathy. Left: No mass, nipple discharge, skin change, tenderness, axillary adenopathy or supraclavicular adenopathy. Musculoskeletal:      Comments: Slight limitation due to tightness in LEFT axilla   Lymphadenopathy:      Upper Body:      Right upper body: No supraclavicular or axillary adenopathy. Left upper body: No supraclavicular or axillary adenopathy. Neurological:      Mental Status: She is alert. Psychiatric:         Attention and Perception: Attention normal.         Mood and Affect: Mood normal.         Speech: Speech normal.         Behavior: Behavior normal.         Visit Vitals  /67 (BP 1 Location: Right arm, BP Patient Position: Sitting, BP Cuff Size: Adult)   Pulse 71   Ht 5' 5\" (1.651 m)   Wt 169 lb (76.7 kg)   LMP 07/08/2020   BMI 28.12 kg/m²       ASSESSMENT and PLAN    ICD-10-CM ICD-9-CM    1. Malignant neoplasm of upper-outer quadrant of left female breast, unspecified estrogen receptor status (HCC)  C50.412 174.4    2. S/P lumpectomy of breast  Z98.890 V45.89    3. S/P radiation therapy  Z92.3 V66.1    4. History of breast cancer in female  Z85.3 V10.3    5. Abnormal finding on breast imaging  R92.8 793.89 [facility] 3D SANDHYA W MAMMO LT DX INCL CAD        Normal exam with no evidence of local recurrence. LEFT axilla - tight - continues to do exercises at home. Has seen PT in the past.     Has stopped using Nuvaring. Using topical estrogen cream PRN. LDmammogram 3D  in 8/2022 as 6 month follow-up for calcs (may have in Fall to have imaging at the same time as her office appointment). RTC in 6 months or sooner PRN.  Anticipate yearly follow-up at that time. She is comfortable with this plan.  All questions answered and she stated understanding.           Total time spent for this patient - 20 minutes.

## 2022-10-11 NOTE — PROGRESS NOTES
HISTORY OF PRESENT ILLNESS  Pushpa Porter is a 50 y.o. female. HPI ESTABLISHED patient presents for follow-up to LEFT breast cancer. Denies breast mass, skin changes, nipple discharge and pain. Breast cancer-  Referring - Dr. Brandee Amezquita  1/31/2020 - T2 N1 M0 (Stage IIA) Invasive ductal carcinoma, Tumor size 2 cm, grade 3, ER 0%, UT 0%, Her 2 3+  2/7/2020 - bone scans and CT scans. Showed benign fibroid uterus, O/w clear  2/24/2020 - RIGHT breast MRI guided biopsy. Benign. 2/25/2020 - port insertion in IR  2/28/2020 - started neoadjuvant TCH-P - Dr. Emigdio Dewey  7/9/2020 - LEFT breast lumpectomy and LEFT SLNB. PCR. - Dr. Cristian Llamas  9/2020 - completed XRT - Dr. Sabrina Marino   3/2021 -finish HP - Dr. Emigdio eDwey  4/6/21 - port removal - Dr. Cristian Llamas        No family history of breast or ovarian cancer. The patient is genetic test negative Christin Salvage Breast Next 2/2020). OB History    No obstetric history on file. Obstetric Comments   Menarche:  13. LMP: 7/8/20  # of Children:  4. Age at Delivery of First Child:  30.   Hysterectomy/oophorectomy:  YES/NO. Breast Bx:  ? Chelle Remedies Hx of Breast Feeding:  yes. BCP:  yes.  Hormone therapy:  no.                    Past Surgical History:   Procedure Laterality Date    HX BREAST BIOPSY Left 07/24/2018    Left sebaceous cyst surgically removed    HX BREAST BIOPSY Right     HX BREAST LUMPECTOMY Left 7/9/2020    LEFT BREAST WIRE-LOCALIZED LUMPECTOMY AND LEFT BREAST SENTINEL NODE BIOPSY performed by Violeta Lyons MD at [facility] AMBULATORY OR    HX CYST INCISION AND DRAINAGE Left 20+ yrs ago    HX HEENT      tonsillectomy    HX VASCULAR ACCESS Right 02/25/2020    right chest area    IR INSERT TUNL CVC W PORT OVER 5 YEARS  2/25/2020       [facility] Results (most recent):  Results from [place] encounter on 02/04/22    [facility] 3D SANDHYA W MAMMO BI DX INCL CAD    Narrative  STUDY:  Bilateral Diagnostic Digital Mammography with tomosynthesis and left  breast ultrasound    INDICATION:  Left lumpectomy for carcinoma in 2020. Surgical pathology  demonstrated a fibrotic tumor bed with no residual carcinoma. COMPARISON:  [id number]    BREAST COMPOSITION: The breast tissue is heterogeneously dense, which could  obscure detection of small masses (approximately 51-75% glandular). FINDINGS: Bilateral digital diagnostic mammography with tomosynthesis was  performed, and is interpreted in conjunction with a computer assisted detection  (CAD) system. Lumpectomy changes are noted in the left breast upper outer  quadrant. There are few associated unchanged linear calcifications which may be  vascular or dystrophic in nature. No new masses or suspicious calcifications are  identified. In the right breast there are no suspicious masses or  microcalcifications. Ultrasound: The palpable area of concern, which was outside of the field of view of the  mammogram was scanned by the technologist and physician. The left  supraclavicular area was scanned. No suspicious solid or cystic lesion is seen. No enlarged lymph nodes visualized. Impression  1. BI-RADS Assessment Category 3: Probably benign finding. Short-interval  follow-up suggested. Stable left breast calcifications. 2.  No suspicious finding in the left supraclavicular area of clinical concern. Recommendation:  Left diagnostic mammography in 6 months. The patient has been notified of these results and recommendations. ROS    Physical Exam  Constitutional:       Appearance: Normal appearance. Chest:   Breasts:     Right: No mass, nipple discharge, skin change or tenderness. Left: No mass, nipple discharge, skin change or tenderness. Comments: Mild post treatment changes to LEFT breast  Musculoskeletal:      Comments: FROM - UE x 2   Lymphadenopathy:      Upper Body:      Right upper body: No supraclavicular or axillary adenopathy. Left upper body: No supraclavicular or axillary adenopathy.    Neurological:      Mental Status: She is alert. Psychiatric:         Attention and Perception: Attention normal.         Mood and Affect: Mood normal.         Speech: Speech normal.         Behavior: Behavior normal.       Visit Vitals  /65 (BP 1 Location: Right arm, BP Patient Position: Sitting, BP Cuff Size: Small adult)   Pulse 68   Ht 5' 5\" (1.651 m)   Wt 169 lb (76.7 kg)   LMP 07/08/2020   BMI 28.12 kg/m²       ASSESSMENT and PLAN    ICD-10-CM ICD-9-CM    1. S/P lumpectomy of breast  Z98.890 V45.89       2. S/P radiation therapy  Z92.3 V66.1       3. Malignant neoplasm of upper-outer quadrant of left female breast, unspecified estrogen receptor status (HCC)  C50.412 174.4       4. History of breast cancer in female  Z85.3 V10.3           Normal exam with no evidence of local recurrence. LEFT axilla - tight - continues to do exercises at home. Has seen PT in the past and does exercises PRN. LDmammogram 3D  scheduled 10/31/22. RTC 1 year or sooner PRN. She is comfortable with this plan. All questions answered and she stated understanding. Total time spent for this patient - 20 minutes.

## 2022-10-17 ENCOUNTER — OFFICE VISIT (OUTPATIENT)
Dept: SURGERY | Age: 47
End: 2022-10-17
Payer: COMMERCIAL

## 2022-10-17 VITALS
HEART RATE: 68 BPM | BODY MASS INDEX: 28.16 KG/M2 | DIASTOLIC BLOOD PRESSURE: 65 MMHG | WEIGHT: 169 LBS | HEIGHT: 65 IN | SYSTOLIC BLOOD PRESSURE: 135 MMHG

## 2022-10-17 DIAGNOSIS — C50.412 MALIGNANT NEOPLASM OF UPPER-OUTER QUADRANT OF LEFT FEMALE BREAST, UNSPECIFIED ESTROGEN RECEPTOR STATUS (HCC): ICD-10-CM

## 2022-10-17 DIAGNOSIS — Z92.3 S/P RADIATION THERAPY: ICD-10-CM

## 2022-10-17 DIAGNOSIS — Z85.3 HISTORY OF BREAST CANCER IN FEMALE: ICD-10-CM

## 2022-10-17 DIAGNOSIS — Z98.890 S/P LUMPECTOMY OF BREAST: Primary | ICD-10-CM

## 2022-10-17 PROCEDURE — 99213 OFFICE O/P EST LOW 20 MIN: CPT | Performed by: NURSE PRACTITIONER

## 2022-10-17 RX ORDER — MELOXICAM 15 MG/1
TABLET ORAL
COMMUNITY
Start: 2022-09-28

## 2023-01-12 ENCOUNTER — TRANSCRIBE ORDER (OUTPATIENT)
Dept: SCHEDULING | Age: 48
End: 2023-01-12

## 2023-01-12 DIAGNOSIS — N63.0 BREAST MASS: Primary | ICD-10-CM

## 2023-02-03 ENCOUNTER — HOSPITAL ENCOUNTER (OUTPATIENT)
Dept: MAMMOGRAPHY | Age: 48
Discharge: HOME | End: 2023-02-03
Attending: NURSE PRACTITIONER
Payer: COMMERCIAL

## 2023-02-03 DIAGNOSIS — N63.0 BREAST MASS: ICD-10-CM

## 2023-02-03 PROCEDURE — 77066 DX MAMMO INCL CAD BI: CPT

## 2023-10-20 ENCOUNTER — TELEPHONE (OUTPATIENT)
Age: 48
End: 2023-10-20

## 2024-08-13 NOTE — PROGRESS NOTES
[facility] - Chemotherapy Progress Note    1400 - Pt admit to [facility] for C8 Herceptin/Perjeta in stable condition. Assessment completed, pt reports diarrhea, describes as a lot, on Wed and some yesterday. Feels tired, but doesn't know if it's from radiation. R chest PAC accessed with with positive blood return. Labs drawn per order and sent. Line flushed, clamped. Chemotherapy Flowsheet 8/28/2020   Cycle C8   Date 8/28/2020   Drug / Regimen Trazimera/Perjeta   Pre Meds -   Notes echo 5/12/20 EF 50-55%        Visit Vitals  /61 (BP 1 Location: Left arm, BP Patient Position: Sitting)   Pulse 79   Temp 98.5 °F (36.9 °C)   Ht 5' 6\" (1.676 m)   Wt 76.2 kg (167 lb 14.4 oz)   SpO2 98%   BMI 27.10 kg/m²       Recent Results (from the past 12 hour(s))   METABOLIC PANEL, COMPREHENSIVE    Collection Time: 08/28/20  2:14 PM   Result Value Ref Range    Sodium 139 136 - 145 mmol/L    Potassium 3.5 3.5 - 5.1 mmol/L    Chloride 108 97 - 108 mmol/L    CO2 25 21 - 32 mmol/L    Anion gap 6 5 - 15 mmol/L    Glucose 92 65 - 100 mg/dL    BUN 22 (H) 6 - 20 MG/DL    Creatinine 0.94 0.55 - 1.02 MG/DL    BUN/Creatinine ratio 23 (H) 12 - 20      GFR est AA >60 >60 ml/min/1.73m2    GFR est non-AA >60 >60 ml/min/1.73m2    Calcium 8.7 8.5 - 10.1 MG/DL    Bilirubin, total 0.3 0.2 - 1.0 MG/DL    ALT (SGPT) 20 12 - 78 U/L    AST (SGOT) 13 (L) 15 - 37 U/L    Alk. phosphatase 76 45 - 117 U/L    Protein, total 6.7 6.4 - 8.2 g/dL    Albumin 3.2 (L) 3.5 - 5.0 g/dL    Globulin 3.5 2.0 - 4.0 g/dL    A-G Ratio 0.9 (L) 1.1 - 2.2     MAGNESIUM    Collection Time: 08/28/20  2:14 PM   Result Value Ref Range    Magnesium 1.8 1.6 - 2.4 mg/dL   PHOSPHORUS    Collection Time: 08/28/20  2:14 PM   Result Value Ref Range    Phosphorus 3.5 2.6 - 4.7 MG/DL       Medications:   NS 1L   NS KVO  Trazimera 460mg over 30min  Perjeta 420mg over 30min     Pt tolerated treatment well.  Port maintained positive blood return throughout treatment, flushed with positive blood return at conclusion, and de-accessed. 1625 - D/c home in no distress. Pt aware of next [facility] appointment scheduled for: 9/18/20 at 1500.      Future Appointments   Date Time Provider Viri Gonzales   8/28/2020  3:00 PM CHAIR 3 [place] [address] REG   9/18/2020  2:30 PM Lesly Reyes NP [facility] BS AMB   9/18/2020  3:00 PM CHAIR 2 [place] [address] REG   10/9/2020  3:00 PM CHAIR 2 [place] [address] REG   1/29/2021  8:00 AM Alix Ramsay MD [facility] BS AMB

## (undated) DEVICE — SOLUTION LACTATED RINGERS INJECTION USP

## (undated) DEVICE — SUTURE VCRL SZ 3-0 L27IN ABSRB UD L26MM SH 1/2 CIR J416H

## (undated) DEVICE — TRAP FLUID BUFFALO FLTR

## (undated) DEVICE — CURVED, SMALL JAW, OPEN SEALER/DIVIDER: Brand: LIGASURE

## (undated) DEVICE — STERILE POLYISOPRENE POWDER-FREE SURGICAL GLOVES WITH EMOLLIENT COATING: Brand: PROTEXIS

## (undated) DEVICE — COVER LT HNDL PLAS RIG 1 PER PK

## (undated) DEVICE — INTENDED FOR TISSUE SEPARATION, AND OTHER PROCEDURES THAT REQUIRE A SHARP SURGICAL BLADE TO PUNCTURE OR CUT.: Brand: BARD-PARKER ® CARBON RIB-BACK BLADES

## (undated) DEVICE — NEEDLE HYPO 25GA L1.5IN BVL ORIENTED ECLIPSE

## (undated) DEVICE — 1010 S-DRAPE TOWEL DRAPE 10/BX: Brand: STERI-DRAPE™

## (undated) DEVICE — TOWEL SURG W17XL27IN STD BLU COT NONFENESTRATED PREWASHED

## (undated) DEVICE — INFECTION CONTROL KIT SYS

## (undated) DEVICE — PAD,NON-ADHERENT,3X8,STERILE,LF,1/PK: Brand: MEDLINE

## (undated) DEVICE — SYR 10ML CTRL LR LCK NSAF LF --

## (undated) DEVICE — DRAPE,LAPAROTOMY,T,PEDI,STERILE: Brand: MEDLINE

## (undated) DEVICE — BOWL UTIL GRAD 32OZ STRL --

## (undated) DEVICE — CONTINU-FLO SOLUTION SET, 2 INJECTION SITES, MALE LUER LOCK ADAPTER WITH RETRACTABLE COLLAR, LARGE BORE STOPCOCK WITH ROTATING MALE LUER LOCK EXTENSION SET, 2 INJECTION SITES, MALE LUER LOCK ADAPTER WITH RETRACTABLE COLLAR: Brand: INTERLINK/CONTINU-FLO

## (undated) DEVICE — REM POLYHESIVE ADULT PATIENT RETURN ELECTRODE: Brand: VALLEYLAB

## (undated) DEVICE — SUT SLK 2-0SH 30IN BLK --

## (undated) DEVICE — PREP SKN CHLRAPRP APL 26ML STR --

## (undated) DEVICE — 3M™ TEGADERM™ TRANSPARENT FILM DRESSING FRAME STYLE, 1624W, 2-3/8 IN X 2-3/4 IN (6 CM X 7 CM), 100/CT 4CT/CASE: Brand: 3M™ TEGADERM™

## (undated) DEVICE — INSULATED BLADE ELECTRODE: Brand: EDGE

## (undated) DEVICE — STERILE NEOPRENE POWDER-FREE SURGICAL GLOVES WITH NITRILE COATING: Brand: PROTEXIS

## (undated) DEVICE — DRAPE,REIN 53X77,STERILE: Brand: MEDLINE

## (undated) DEVICE — CONTAINER,SPEC,PNEUM TUBE,3OZ,STRL PATH: Brand: MEDLINE

## (undated) DEVICE — SYR 10ML LUER LOK 1/5ML GRAD --

## (undated) DEVICE — HANDLE LT SNAP ON ULT DURABLE LENS FOR TRUMPF ALC DISPOSABLE

## (undated) DEVICE — DERMABOND SKIN ADH 0.7ML -- DERMABOND ADVANCED 12/BX

## (undated) DEVICE — SPONGE GZ W4XL4IN COT 12 PLY TYP VII WVN C FLD DSGN

## (undated) DEVICE — DRAPE PRB US TRNSDCR 6X96IN --

## (undated) DEVICE — Device

## (undated) DEVICE — SPONGE GZ W4XL4IN COT RADPQ HIGHLY ABSRB

## (undated) DEVICE — COVER US PRB W4XL15IN GAM CRDLSS FLD

## (undated) DEVICE — SUTURE MCRYL SZ 4-0 L27IN ABSRB UD L19MM PS-2 1/2 CIR PRIM Y426H

## (undated) DEVICE — SOLUTION IV 1000ML 0.9% SOD CHL

## (undated) DEVICE — SMOKE EVACUATION PENCIL: Brand: VALLEYLAB

## (undated) DEVICE — GARMENT,MEDLINE,DVT,INT,CALF,MED, GEN2: Brand: MEDLINE

## (undated) DEVICE — CHEST/BREAST-LF: Brand: MEDLINE INDUSTRIES, INC.

## (undated) DEVICE — SUTURE VCRL SZ 2-0 L27IN ABSRB UD L26MM SH 1/2 CIR J417H

## (undated) DEVICE — KENDALL DL ECG CABLE AND LEAD WIRE SYSTEM, 3-LEAD, SINGLE PATIENT USE: Brand: KENDALL